# Patient Record
Sex: FEMALE | Race: WHITE | NOT HISPANIC OR LATINO | Employment: OTHER | URBAN - METROPOLITAN AREA
[De-identification: names, ages, dates, MRNs, and addresses within clinical notes are randomized per-mention and may not be internally consistent; named-entity substitution may affect disease eponyms.]

---

## 2017-07-19 ENCOUNTER — OFFICE VISIT (OUTPATIENT)
Dept: SURGERY | Facility: CLINIC | Age: 47
End: 2017-07-19

## 2017-07-19 VITALS
HEART RATE: 93 BPM | TEMPERATURE: 97 F | BODY MASS INDEX: 50.02 KG/M2 | DIASTOLIC BLOOD PRESSURE: 88 MMHG | HEIGHT: 64 IN | RESPIRATION RATE: 22 BRPM | SYSTOLIC BLOOD PRESSURE: 128 MMHG | OXYGEN SATURATION: 99 % | WEIGHT: 293 LBS

## 2017-07-19 DIAGNOSIS — L08.9 INFECTED SEBACEOUS CYST: Primary | ICD-10-CM

## 2017-07-19 DIAGNOSIS — L03.311 ABDOMINAL WALL CELLULITIS: ICD-10-CM

## 2017-07-19 DIAGNOSIS — L72.3 INFECTED SEBACEOUS CYST: Primary | ICD-10-CM

## 2017-07-19 PROCEDURE — 87205 SMEAR GRAM STAIN: CPT | Performed by: SURGERY

## 2017-07-19 PROCEDURE — 87070 CULTURE OTHR SPECIMN AEROBIC: CPT | Performed by: SURGERY

## 2017-07-19 PROCEDURE — 99203 OFFICE O/P NEW LOW 30 MIN: CPT | Performed by: SURGERY

## 2017-07-19 RX ORDER — PRAVASTATIN SODIUM 40 MG
40 TABLET ORAL DAILY
COMMUNITY

## 2017-07-19 RX ORDER — SPIRONOLACTONE 25 MG/1
25 TABLET ORAL DAILY
COMMUNITY

## 2017-07-19 RX ORDER — PERPHENAZINE 4 MG/1
4 TABLET ORAL 2 TIMES DAILY
COMMUNITY
End: 2019-03-31

## 2017-07-19 RX ORDER — METRONIDAZOLE 500 MG/1
500 TABLET ORAL 3 TIMES DAILY
Qty: 30 TABLET | Refills: 0 | Status: SHIPPED | OUTPATIENT
Start: 2017-07-19 | End: 2017-07-29

## 2017-07-19 RX ORDER — LEVOTHYROXINE SODIUM 0.03 MG/1
25 TABLET ORAL DAILY
Status: ON HOLD | COMMUNITY
End: 2017-12-07

## 2017-07-19 RX ORDER — ALLOPURINOL 300 MG/1
300 TABLET ORAL DAILY
COMMUNITY

## 2017-07-19 RX ORDER — DIVALPROEX SODIUM 500 MG/1
500 TABLET, DELAYED RELEASE ORAL 3 TIMES DAILY
COMMUNITY
End: 2018-05-01 | Stop reason: SDUPTHER

## 2017-07-19 RX ORDER — CIPROFLOXACIN 500 MG/1
500 TABLET, FILM COATED ORAL 2 TIMES DAILY
Qty: 20 TABLET | Refills: 0 | Status: SHIPPED | OUTPATIENT
Start: 2017-07-19 | End: 2017-07-29

## 2017-07-19 RX ORDER — FUROSEMIDE 20 MG/1
20 TABLET ORAL 2 TIMES DAILY
Status: ON HOLD | COMMUNITY
End: 2019-09-26 | Stop reason: SDUPTHER

## 2017-07-19 RX ORDER — TRAMADOL HYDROCHLORIDE 50 MG/1
50 TABLET ORAL EVERY 6 HOURS PRN
COMMUNITY
End: 2018-03-23 | Stop reason: SDUPTHER

## 2017-07-19 RX ORDER — DEXTROAMPHETAMINE SACCHARATE, AMPHETAMINE ASPARTATE, DEXTROAMPHETAMINE SULFATE AND AMPHETAMINE SULFATE 5; 5; 5; 5 MG/1; MG/1; MG/1; MG/1
20 TABLET ORAL DAILY
COMMUNITY
End: 2019-09-26 | Stop reason: HOSPADM

## 2017-07-19 NOTE — PROGRESS NOTES
PATIENT INFORMATION  Kirsten Centeno   - 1970    CHIEF COMPLAINT  Chief Complaint   Patient presents with   • Cyst   pubic cyst x 3 days, has increased in size, no drainage  Abdominal wall swelling and redness  Referral from Dr. Cunha      HISTORY OF PRESENT ILLNESS  HPI    Patient is a 47-year-old female with morbid obesity BMI is 52 who presents to the office today for evaluation for abdominal wall cellulitis and a infected cyst on the vaginal area.  She reports that she noticed a cyst on the suprapubic area 3-4 days ago.  Subsequently he developed a infra-abdominal rash.  She was seen by her primary care physician and started on Keflex and topical powder.  The rash has resolved the cyst has become smaller in size but she has now since the last 24 hours developed severe swelling and erythema of her abdominal wall.  She is unaware of any trauma, injury, hematoma.  Denies any previous soft tissue infections, history of MRSA or staph aureus.  She does have morbid obesity and diabetes.  She informed me that she has not checked her blood sugars she did since she does not own a glucometer.  She denies any fevers, chills, sweats, nausea, vomiting.  She has had associated abdominal pain along the infraumbilical aspect.  She reports regular bowel movements, denies melena or hematochezia.    REVIEW OF SYSTEMS  Review of Systems   Constitutional: Positive for activity change. Negative for chills, fever and unexpected weight change.   Respiratory: Negative.    Cardiovascular: Negative.    Gastrointestinal: Positive for abdominal pain.   Endocrine: Positive for polydipsia and polyuria.   Skin: Positive for color change and wound.         ACTIVE PROBLEMS  There are no active problems to display for this patient.        PAST MEDICAL HISTORY  Past Medical History:   Diagnosis Date   • Diabetes    • Gout    • HTN (hypertension)    • Hyperlipidemia    • Hyperthyroidism    • Neuropathy          SURGICAL HISTORY  Past  Surgical History:   Procedure Laterality Date   • MANDIBLE FRACTURE SURGERY           FAMILY HISTORY  Family History   Problem Relation Age of Onset   • Cervical cancer Maternal Grandmother          SOCIAL HISTORY  Social History     Occupational History   • Not on file.     Social History Main Topics   • Smoking status: Current Every Day Smoker     Packs/day: 2.00   • Smokeless tobacco: Not on file   • Alcohol use No   • Drug use: Not on file   • Sexual activity: Not on file         CURRENT MEDICATIONS    Current Outpatient Prescriptions:   •  allopurinol (ZYLOPRIM) 300 MG tablet, Take 300 mg by mouth Daily., Disp: , Rfl:   •  amphetamine-dextroamphetamine (ADDERALL) 20 MG tablet, Take 20 mg by mouth Daily., Disp: , Rfl:   •  divalproex (DEPAKOTE) 500 MG DR tablet, Take 500 mg by mouth 3 (Three) Times a Day., Disp: , Rfl:   •  furosemide (LASIX) 20 MG tablet, Take 20 mg by mouth 2 (Two) Times a Day., Disp: , Rfl:   •  levothyroxine (SYNTHROID, LEVOTHROID) 25 MCG tablet, Take 25 mcg by mouth Daily., Disp: , Rfl:   •  OLMESARTAN MEDOXOMIL-HCTZ PO, Take  by mouth., Disp: , Rfl:   •  Omega-3 Fatty Acids (OMEGA 3 PO), Take  by mouth., Disp: , Rfl:   •  perphenazine (TRILAFON) 4 MG tablet, Take 4 mg by mouth 2 (Two) Times a Day., Disp: , Rfl:   •  pravastatin (PRAVACHOL) 40 MG tablet, Take 40 mg by mouth Daily., Disp: , Rfl:   •  spironolactone (ALDACTONE) 25 MG tablet, Take 25 mg by mouth Daily., Disp: , Rfl:   •  traMADol (ULTRAM) 50 MG tablet, Take 50 mg by mouth Every 6 (Six) Hours As Needed for Moderate Pain ., Disp: , Rfl:   •  ciprofloxacin (CIPRO) 500 MG tablet, Take 1 tablet by mouth 2 (Two) Times a Day for 10 days., Disp: 20 tablet, Rfl: 0  •  metroNIDAZOLE (FLAGYL) 500 MG tablet, Take 1 tablet by mouth 3 (Three) Times a Day for 10 days., Disp: 30 tablet, Rfl: 0    ALLERGIES  Penicillins    VITALS  Vitals:    07/19/17 1022   BP: 128/88   Pulse: 93   Resp: 22   Temp: 97 °F (36.1 °C)   SpO2: 99%   Weight: (!) 303  "lb (137 kg)   Height: 64\" (162.6 cm)       LAST RESULTS   Admission on 08/08/2014, Discharged on 08/08/2014   Component Date Value Ref Range Status   • Glucose 08/08/2014 82  65 - 99 mg/dL Final   • BUN 08/08/2014 31* 6 - 20 mg/dL Final   • Creatinine 08/08/2014 0.90  0.57 - 1.00 mg/dL Final   • Sodium 08/08/2014 136  136 - 145 mmol/L Final   • Potassium 08/08/2014 4.1  3.5 - 5.2 mmol/L Final   • Chloride 08/08/2014 91* 98 - 107 mmol/L Final   • CO2 08/08/2014 27  22 - 29 mmol/L Final   • Calcium 08/08/2014 10.4* 8.6 - 10.2 mg/dL Final   • eGFR 08/08/2014 >60  ml/min/1.732 Final    Comment: DF by IF @ 08/08/2014 20:09  GFR Normal                            >60  Chronic Kidney Disease          <60  Kidney Failure                         <15       No results found.    PHYSICAL EXAM  Physical Exam   Constitutional: She is oriented to person, place, and time. She appears well-developed and well-nourished.   HENT:   Head: Normocephalic and atraumatic.   Eyes: No scleral icterus.   Neck: Normal range of motion. Neck supple.   Cardiovascular: Normal rate, regular rhythm and normal heart sounds.    Pulmonary/Chest: Breath sounds normal.   Abdominal:   Soft, morbidly obese, large abdominal pannus with infra umbilical abdominal wall cellulitis and brawny edema.  It is mildly tender to palpation.    Infraumbilical fungal rash resolving.  There is a small indurated area over the mons pubis.  I was able to express a scant amount of drainage cultures of which were obtained.  There is no fluctuance or drainable collection at this time there is no crepitus or necrosis.   Musculoskeletal: She exhibits edema.   Neurological: She is alert and oriented to person, place, and time.   Nursing note and vitals reviewed.      ASSESSMENT  Soft tissue infection  Infected cyst on mons pubis/vagina--  I have recommended to the patient local wound care, warm compresses, I will switch her antibiotics to ciprofloxacin and Flagyl for better " coverage.  I have discussed with her that we will reassess this and that it might need to be drained.  At this time there is no drainable collection.    Abdominal wall cellulitis/panniculitis  Discussed with patient will need urgent CT scan abdomen pelvis and likely admission to hospital for IV antibiotics  Patient declined due to personal family obligation today but is agreeable to getting CT scan as outpatient.  Will obtain CT scan with oral and IV contrast as soon as possible  Will check CBC and CMP      PLAN  Follow-up 7/24/17.  I will also call the patient once the CT scan results are available.  Patient was advised to call the office sooner should she have worsening symptoms or go to the nearest emergency room.

## 2017-07-20 ENCOUNTER — HOSPITAL ENCOUNTER (OUTPATIENT)
Dept: CT IMAGING | Facility: HOSPITAL | Age: 47
Discharge: HOME | End: 2017-07-20
Attending: SURGERY | Admitting: SURGERY

## 2017-07-20 ENCOUNTER — APPOINTMENT (OUTPATIENT)
Dept: LAB | Facility: HOSPITAL | Age: 47
End: 2017-07-20

## 2017-07-20 LAB
ANION GAP SERPL CALCULATED.3IONS-SCNC: 11.6 MMOL/L
BASOPHILS # BLD AUTO: 0.05 10*3/MM3 (ref 0–0.2)
BASOPHILS NFR BLD AUTO: 0.4 % (ref 0–2)
BUN BLD-MCNC: 9 MG/DL (ref 6–20)
BUN/CREAT SERPL: 10.8 (ref 7–25)
CALCIUM SPEC-SCNC: 9.8 MG/DL (ref 8.6–10.5)
CHLORIDE SERPL-SCNC: 91 MMOL/L (ref 98–107)
CO2 SERPL-SCNC: 28.4 MMOL/L (ref 22–29)
CREAT BLD-MCNC: 0.83 MG/DL (ref 0.57–1)
DEPRECATED RDW RBC AUTO: 49.7 FL (ref 37–54)
EOSINOPHIL # BLD AUTO: 0.16 10*3/MM3 (ref 0.1–0.3)
EOSINOPHIL NFR BLD AUTO: 1.3 % (ref 0–4)
ERYTHROCYTE [DISTWIDTH] IN BLOOD BY AUTOMATED COUNT: 16.1 % (ref 11.5–14.5)
GFR SERPL CREATININE-BSD FRML MDRD: 73 ML/MIN/1.73
GLUCOSE BLD-MCNC: 108 MG/DL (ref 65–99)
HCT VFR BLD AUTO: 40.6 % (ref 37–47)
HGB BLD-MCNC: 13.2 G/DL (ref 12–16)
IMM GRANULOCYTES # BLD: 0.51 10*3/MM3 (ref 0–0.03)
IMM GRANULOCYTES NFR BLD: 4.2 % (ref 0–0.5)
LYMPHOCYTES # BLD AUTO: 3.21 10*3/MM3 (ref 0.6–4.8)
LYMPHOCYTES NFR BLD AUTO: 26.3 % (ref 20–45)
MCH RBC QN AUTO: 27.3 PG (ref 27–31)
MCHC RBC AUTO-ENTMCNC: 32.5 G/DL (ref 31–37)
MCV RBC AUTO: 84.1 FL (ref 81–99)
MONOCYTES # BLD AUTO: 0.9 10*3/MM3 (ref 0–1)
MONOCYTES NFR BLD AUTO: 7.4 % (ref 3–8)
NEUTROPHILS # BLD AUTO: 7.38 10*3/MM3 (ref 1.5–8.3)
NEUTROPHILS NFR BLD AUTO: 60.4 % (ref 45–70)
NRBC BLD MANUAL-RTO: 0 /100 WBC (ref 0–0)
PLATELET # BLD AUTO: 381 10*3/MM3 (ref 140–500)
PMV BLD AUTO: 8.5 FL (ref 7.4–10.4)
POTASSIUM BLD-SCNC: 4.7 MMOL/L (ref 3.5–5.2)
RBC # BLD AUTO: 4.83 10*6/MM3 (ref 4.2–5.4)
SODIUM BLD-SCNC: 131 MMOL/L (ref 136–145)
WBC NRBC COR # BLD: 12.21 10*3/MM3 (ref 4.8–10.8)

## 2017-07-20 PROCEDURE — 85025 COMPLETE CBC W/AUTO DIFF WBC: CPT | Performed by: SURGERY

## 2017-07-20 PROCEDURE — 36415 COLL VENOUS BLD VENIPUNCTURE: CPT | Performed by: SURGERY

## 2017-07-20 PROCEDURE — 74177 CT ABD & PELVIS W/CONTRAST: CPT

## 2017-07-20 PROCEDURE — 80048 BASIC METABOLIC PNL TOTAL CA: CPT | Performed by: SURGERY

## 2017-07-20 PROCEDURE — 0 IOPAMIDOL PER 1 ML: Performed by: SURGERY

## 2017-07-20 RX ADMIN — IOPAMIDOL 100 ML: 755 INJECTION, SOLUTION INTRAVENOUS at 17:45

## 2017-07-21 LAB
BACTERIA SPEC AEROBE CULT: NORMAL
GRAM STN SPEC: NORMAL

## 2017-07-24 ENCOUNTER — OFFICE VISIT (OUTPATIENT)
Dept: SURGERY | Facility: CLINIC | Age: 47
End: 2017-07-24

## 2017-07-24 VITALS
HEIGHT: 64 IN | BODY MASS INDEX: 50.02 KG/M2 | SYSTOLIC BLOOD PRESSURE: 132 MMHG | WEIGHT: 293 LBS | DIASTOLIC BLOOD PRESSURE: 88 MMHG

## 2017-07-24 DIAGNOSIS — L72.9 INFECTED CYST OF SKIN: ICD-10-CM

## 2017-07-24 DIAGNOSIS — M79.3 PANNICULITIS: Primary | ICD-10-CM

## 2017-07-24 DIAGNOSIS — L08.9 INFECTED CYST OF SKIN: ICD-10-CM

## 2017-07-24 PROCEDURE — 99213 OFFICE O/P EST LOW 20 MIN: CPT | Performed by: SURGERY

## 2017-07-24 RX ORDER — NYSTATIN 100000 [USP'U]/G
POWDER TOPICAL 2 TIMES DAILY
Qty: 30 G | Refills: 1 | Status: SHIPPED | OUTPATIENT
Start: 2017-07-24 | End: 2018-03-23

## 2017-07-31 ENCOUNTER — OFFICE VISIT (OUTPATIENT)
Dept: SURGERY | Facility: CLINIC | Age: 47
End: 2017-07-31

## 2017-07-31 VITALS
SYSTOLIC BLOOD PRESSURE: 140 MMHG | WEIGHT: 293 LBS | HEIGHT: 64 IN | DIASTOLIC BLOOD PRESSURE: 98 MMHG | BODY MASS INDEX: 50.02 KG/M2

## 2017-07-31 DIAGNOSIS — Z09 FOLLOW UP: Primary | ICD-10-CM

## 2017-07-31 DIAGNOSIS — L98.9 ARM SKIN LESION, LEFT: ICD-10-CM

## 2017-07-31 PROCEDURE — 99213 OFFICE O/P EST LOW 20 MIN: CPT | Performed by: SURGERY

## 2017-07-31 RX ORDER — ESZOPICLONE 3 MG/1
TABLET, FILM COATED ORAL
Status: ON HOLD | COMMUNITY
Start: 2017-07-25 | End: 2017-12-07

## 2017-07-31 NOTE — PROGRESS NOTES
PATIENT INFORMATION  Kirsten Centeno   - 1970    CHIEF COMPLAINT  Chief Complaint   Patient presents with   • Follow-up   F/U CYST, PT HAS FINISHED ABX, AREA IS HEALING WELL  Would like to discuss another skin lesion on left shoulder    HISTORY OF PRESENT ILLNESS  HPI  Patient presents to the office today for follow-up.  She reports she is doing well.  Denies any fevers, chills, erythema or drainage.  She reports her abdominal wall cellulitis is completely resolved.  As far as the suprapubic cyst is concerned that is completely healed.  She still has some infra-umbilical rash around the groin and under the pannus and has been applying nystatin powder.  She does complain of left ear drainage and sinus drainage and have advised her to contact her primary care physician in this regards.  She also would like to have a skin lesion evaluated for possible surgical excision she reports she has had this on her left shoulder it has been there for several years but has recently enlarged in size, is discolored and has irregular borders.    REVIEW OF SYSTEMS  Review of Systems   Constitutional: Negative.    HENT: Positive for congestion, postnasal drip and sinus pressure.    Respiratory: Negative.    Cardiovascular: Negative.    Gastrointestinal: Negative.    Skin: Negative for color change and wound.        Skin lesion         ACTIVE PROBLEMS  There are no active problems to display for this patient.        PAST MEDICAL HISTORY  Past Medical History:   Diagnosis Date   • Diabetes    • Gout    • HTN (hypertension)    • Hyperlipidemia    • Hyperthyroidism    • Neuropathy          SURGICAL HISTORY  Past Surgical History:   Procedure Laterality Date   • MANDIBLE FRACTURE SURGERY           FAMILY HISTORY  Family History   Problem Relation Age of Onset   • Cervical cancer Maternal Grandmother          SOCIAL HISTORY  Social History     Occupational History   • Not on file.     Social History Main Topics   • Smoking status:  "Current Every Day Smoker     Packs/day: 2.00   • Smokeless tobacco: Not on file   • Alcohol use No   • Drug use: Not on file   • Sexual activity: Not on file         CURRENT MEDICATIONS    Current Outpatient Prescriptions:   •  allopurinol (ZYLOPRIM) 300 MG tablet, Take 300 mg by mouth Daily., Disp: , Rfl:   •  amphetamine-dextroamphetamine (ADDERALL) 20 MG tablet, Take 20 mg by mouth Daily., Disp: , Rfl:   •  divalproex (DEPAKOTE) 500 MG DR tablet, Take 500 mg by mouth 3 (Three) Times a Day., Disp: , Rfl:   •  eszopiclone (LUNESTA) 3 MG tablet, , Disp: , Rfl:   •  furosemide (LASIX) 20 MG tablet, Take 20 mg by mouth 2 (Two) Times a Day., Disp: , Rfl:   •  levothyroxine (SYNTHROID, LEVOTHROID) 25 MCG tablet, Take 25 mcg by mouth Daily., Disp: , Rfl:   •  nystatin (MYCOSTATIN) 555002 UNIT/GM powder, Apply  topically 2 (Two) Times a Day. APPLY TO AFFECTED AREA TWICE DAILY X 14 DAYS, Disp: 30 g, Rfl: 1  •  OLMESARTAN MEDOXOMIL-HCTZ PO, Take  by mouth., Disp: , Rfl:   •  Omega-3 Fatty Acids (OMEGA 3 PO), Take  by mouth., Disp: , Rfl:   •  perphenazine (TRILAFON) 4 MG tablet, Take 4 mg by mouth 2 (Two) Times a Day., Disp: , Rfl:   •  pravastatin (PRAVACHOL) 40 MG tablet, Take 40 mg by mouth Daily., Disp: , Rfl:   •  spironolactone (ALDACTONE) 25 MG tablet, Take 25 mg by mouth Daily., Disp: , Rfl:   •  traMADol (ULTRAM) 50 MG tablet, Take 50 mg by mouth Every 6 (Six) Hours As Needed for Moderate Pain ., Disp: , Rfl:     ALLERGIES  Penicillins    VITALS  Vitals:    07/31/17 1033   BP: 140/98   Weight: 295 lb (134 kg)   Height: 64\" (162.6 cm)       LAST RESULTS   Office Visit on 07/19/2017   Component Date Value Ref Range Status   • Wound Culture 07/19/2017 Moderate growth (3+) Normal Urogenital Brionna   Final   • Gram Stain Result 07/19/2017 Few (2+) WBCs seen   Final   • Gram Stain Result 07/19/2017 Moderate (3+) Gram positive cocci in pairs and clusters   Final   • Gram Stain Result 07/19/2017 Few (2+) Gram negative " bacilli   Final   • Gram Stain Result 07/19/2017 Rare (1+) Gram positive bacilli   Final   • Glucose 07/20/2017 108* 65 - 99 mg/dL Final   • BUN 07/20/2017 9  6 - 20 mg/dL Final   • Creatinine 07/20/2017 0.83  0.57 - 1.00 mg/dL Final   • Sodium 07/20/2017 131* 136 - 145 mmol/L Final   • Potassium 07/20/2017 4.7  3.5 - 5.2 mmol/L Final   • Chloride 07/20/2017 91* 98 - 107 mmol/L Final   • CO2 07/20/2017 28.4  22.0 - 29.0 mmol/L Final   • Calcium 07/20/2017 9.8  8.6 - 10.5 mg/dL Final   • eGFR Non  Amer 07/20/2017 73  >60 mL/min/1.73 Final   • BUN/Creatinine Ratio 07/20/2017 10.8  7.0 - 25.0 Final   • Anion Gap 07/20/2017 11.6  mmol/L Final   • WBC 07/20/2017 12.21* 4.80 - 10.80 10*3/mm3 Final   • RBC 07/20/2017 4.83  4.20 - 5.40 10*6/mm3 Final   • Hemoglobin 07/20/2017 13.2  12.0 - 16.0 g/dL Final   • Hematocrit 07/20/2017 40.6  37.0 - 47.0 % Final   • MCV 07/20/2017 84.1  81.0 - 99.0 fL Final   • MCH 07/20/2017 27.3  27.0 - 31.0 pg Final   • MCHC 07/20/2017 32.5  31.0 - 37.0 g/dL Final   • RDW 07/20/2017 16.1* 11.5 - 14.5 % Final   • RDW-SD 07/20/2017 49.7  37.0 - 54.0 fl Final   • MPV 07/20/2017 8.5  7.4 - 10.4 fL Final   • Platelets 07/20/2017 381  140 - 500 10*3/mm3 Final   • Neutrophil % 07/20/2017 60.4  45.0 - 70.0 % Final   • Lymphocyte % 07/20/2017 26.3  20.0 - 45.0 % Final   • Monocyte % 07/20/2017 7.4  3.0 - 8.0 % Final   • Eosinophil % 07/20/2017 1.3  0.0 - 4.0 % Final   • Basophil % 07/20/2017 0.4  0.0 - 2.0 % Final   • Immature Grans % 07/20/2017 4.2* 0.0 - 0.5 % Final   • Neutrophils, Absolute 07/20/2017 7.38  1.50 - 8.30 10*3/mm3 Final   • Lymphocytes, Absolute 07/20/2017 3.21  0.60 - 4.80 10*3/mm3 Final   • Monocytes, Absolute 07/20/2017 0.90  0.00 - 1.00 10*3/mm3 Final   • Eosinophils, Absolute 07/20/2017 0.16  0.10 - 0.30 10*3/mm3 Final   • Basophils, Absolute 07/20/2017 0.05  0.00 - 0.20 10*3/mm3 Final   • Immature Grans, Absolute 07/20/2017 0.51* 0.00 - 0.03 10*3/mm3 Final   • nRBC  07/20/2017 0.0  0.0 - 0.0 /100 WBC Final     Ct Abdomen Pelvis W Contrast    Result Date: 7/21/2017  Narrative: CT ABDOMEN AND PELVIS WITH CONTRAST, 07/20/2017:  HISTORY: 47-year-old female referred infraumbilical abdominal wall cellulitis. Morbid obesity.  TECHNIQUE: CT examination of the abdomen and pelvis was performed with oral and IV contrast. Radiation dose reduction techniques were utilized, including automated exposure control and exposure modulation based on body size.  COMPARISON: *  CT pelvis, 09/16/2016.  ABDOMEN FINDINGS: The examination shows mild cutaneous thickening inferior to the umbilicus, but there is no deeper soft tissue abnormality within the subcutaneous soft tissues, there is no evidence of abscess or other fluid collection. Similar findings were present on the previous CT study. Of note, the inferior margins of the large pannus extend inferiorly below the level of the imaged pelvis and are not included on this exam. There is no umbilical hernia or additional abdominal wall hernia.  Liver, pancreas, spleen and kidneys are normal in size and appearance. Nondistended gallbladder. No bile duct dilatation.  Small bowel and colon are normal in caliber and appearance. No evidence of acute appendicitis. Normal caliber abdominal aorta.  PELVIS FINDINGS: Uterus, ovaries, urinary bladder and rectum appear normal. There is no inguinal hernia. The patient has additional reported history of a small cutaneous cystic lesion in the pubic region reportedly treated by the referring physician, but this region is apparently not included in the field-of-view.      Impression: 1. Anterior abdominal wall skin thickening in the infraumbilical region compatible with the clinical history of cellulitis. Similar findings were present on the previous CT study of 09/16/2016. No evidence of abscess or additional complicating feature. 2. The remainder of the examination is negative. No inguinal hernia or abdominal wall  hernia.  This report was finalized on 7/21/2017 7:15 AM by Dr. Wolf Flanagan MD.        PHYSICAL EXAM  Physical Exam   Constitutional: She appears well-developed and well-nourished.   Eyes: No scleral icterus.   Neck: Neck supple.   Cardiovascular: Normal rate, regular rhythm and normal heart sounds.    Pulmonary/Chest: Breath sounds normal.   Abdominal:   Soft, morbidly obese,the panniculitis/cellulitis is completely resolved.    Suprapubic cyst completely healed no erythema no drainage no abscess.   Musculoskeletal:   Left shoulder anterior lateral aspect there is a 3 cm x 1 cm skin lesion with irregular borders and variegated  discoloration.  It is mildly tender to palpation.   Nursing note and vitals reviewed.      ASSESSMENT  Abdominal wall panniculitis/cellulitis-completely resolved  Abdominal wall/suprapubic infected sebaceous cyst completely resolved    Risk of recurrence discussed with patient who verbalized understanding.    Left shoulder skin lesion - excision under local anesthesia discussed with the patient.  Procedure, risks, benefits complications including but not limited to risk of bleeding, infection, flap necrosis, flap ischemia, risk of residual disease, risk of recurrence as well as complications associated with local anesthesia were thoroughly discussed with the patient who understood and gave verbal informed consent.     PLAN  Will schedule for in office procedure for left shoulder lesion excision.  Patient was advised to contact therapy regarding her complaints of sinus congestion and possible ear infection.  Patient was advised to call the office sooner should she worsening symptoms or go to the nearest emergency room.

## 2017-11-17 ENCOUNTER — OFFICE VISIT (OUTPATIENT)
Dept: ORTHOPEDIC SURGERY | Facility: CLINIC | Age: 47
End: 2017-11-17

## 2017-11-17 VITALS
HEART RATE: 102 BPM | DIASTOLIC BLOOD PRESSURE: 83 MMHG | SYSTOLIC BLOOD PRESSURE: 133 MMHG | WEIGHT: 293 LBS | BODY MASS INDEX: 50.02 KG/M2 | HEIGHT: 64 IN

## 2017-11-17 DIAGNOSIS — R52 PAIN: Primary | ICD-10-CM

## 2017-11-17 DIAGNOSIS — M17.0 PRIMARY OSTEOARTHRITIS OF BOTH KNEES: ICD-10-CM

## 2017-11-17 PROCEDURE — 20610 DRAIN/INJ JOINT/BURSA W/O US: CPT | Performed by: ORTHOPAEDIC SURGERY

## 2017-11-17 PROCEDURE — 99213 OFFICE O/P EST LOW 20 MIN: CPT | Performed by: ORTHOPAEDIC SURGERY

## 2017-11-17 PROCEDURE — 73562 X-RAY EXAM OF KNEE 3: CPT | Performed by: ORTHOPAEDIC SURGERY

## 2017-11-17 RX ORDER — DIVALPROEX SODIUM 500 MG/1
TABLET, EXTENDED RELEASE ORAL
Status: ON HOLD | COMMUNITY
Start: 2017-10-17 | End: 2017-12-07

## 2017-11-17 RX ORDER — RISPERIDONE 2 MG/1
TABLET ORAL
Status: ON HOLD | COMMUNITY
Start: 2017-10-17 | End: 2017-12-07

## 2017-11-17 RX ORDER — OLMESARTAN MEDOXOMIL 40 MG/1
40 TABLET ORAL DAILY
COMMUNITY
Start: 2017-11-15 | End: 2019-09-26 | Stop reason: HOSPADM

## 2017-11-17 RX ADMIN — BUPIVACAINE HYDROCHLORIDE 4 ML: 5 INJECTION, SOLUTION EPIDURAL; INTRACAUDAL at 09:33

## 2017-11-17 RX ADMIN — LIDOCAINE HYDROCHLORIDE 4 ML: 10 INJECTION, SOLUTION EPIDURAL; INFILTRATION; INTRACAUDAL; PERINEURAL at 09:33

## 2017-11-17 RX ADMIN — TRIAMCINOLONE ACETONIDE 80 MG: 40 INJECTION, SUSPENSION INTRA-ARTICULAR; INTRAMUSCULAR at 09:33

## 2017-12-02 PROBLEM — M17.0 PRIMARY OSTEOARTHRITIS OF BOTH KNEES: Status: ACTIVE | Noted: 2017-12-02

## 2017-12-02 RX ORDER — LIDOCAINE HYDROCHLORIDE 10 MG/ML
4 INJECTION, SOLUTION EPIDURAL; INFILTRATION; INTRACAUDAL; PERINEURAL
Status: COMPLETED | OUTPATIENT
Start: 2017-11-17 | End: 2017-11-17

## 2017-12-02 RX ORDER — BUPIVACAINE HYDROCHLORIDE 5 MG/ML
4 INJECTION, SOLUTION EPIDURAL; INTRACAUDAL
Status: COMPLETED | OUTPATIENT
Start: 2017-11-17 | End: 2017-11-17

## 2017-12-02 RX ORDER — TRIAMCINOLONE ACETONIDE 40 MG/ML
80 INJECTION, SUSPENSION INTRA-ARTICULAR; INTRAMUSCULAR
Status: COMPLETED | OUTPATIENT
Start: 2017-11-17 | End: 2017-11-17

## 2017-12-07 ENCOUNTER — HOSPITAL ENCOUNTER (INPATIENT)
Facility: HOSPITAL | Age: 47
LOS: 4 days | Discharge: HOME | End: 2017-12-11
Attending: EMERGENCY MEDICINE | Admitting: FAMILY MEDICINE

## 2017-12-07 ENCOUNTER — APPOINTMENT (OUTPATIENT)
Dept: CT IMAGING | Facility: HOSPITAL | Age: 47
End: 2017-12-07

## 2017-12-07 ENCOUNTER — APPOINTMENT (OUTPATIENT)
Dept: GENERAL RADIOLOGY | Facility: HOSPITAL | Age: 47
End: 2017-12-07

## 2017-12-07 DIAGNOSIS — L03.311 CELLULITIS OF ABDOMINAL WALL: Primary | ICD-10-CM

## 2017-12-07 DIAGNOSIS — E86.0 DEHYDRATION: ICD-10-CM

## 2017-12-07 DIAGNOSIS — N17.9 AKI (ACUTE KIDNEY INJURY) (HCC): ICD-10-CM

## 2017-12-07 LAB
ALBUMIN SERPL-MCNC: 3.6 G/DL (ref 3.5–5.2)
ALBUMIN/GLOB SERPL: 1 G/DL
ALP SERPL-CCNC: 97 U/L (ref 40–129)
ALT SERPL W P-5'-P-CCNC: 7 U/L (ref 5–33)
ANION GAP SERPL CALCULATED.3IONS-SCNC: 18.5 MMOL/L
APTT PPP: 34.9 SECONDS (ref 24.3–38.1)
AST SERPL-CCNC: 9 U/L (ref 5–32)
BASOPHILS # BLD AUTO: 0.15 10*3/MM3 (ref 0–0.2)
BASOPHILS NFR BLD AUTO: 0.8 % (ref 0–2)
BILIRUB SERPL-MCNC: 0.3 MG/DL (ref 0.2–1.2)
BILIRUB UR QL STRIP: ABNORMAL
BUN BLD-MCNC: 13 MG/DL (ref 6–20)
BUN/CREAT SERPL: 7.7 (ref 7–25)
CALCIUM SPEC-SCNC: 9.2 MG/DL (ref 8.6–10.5)
CHLORIDE SERPL-SCNC: 86 MMOL/L (ref 98–107)
CLARITY UR: CLEAR
CO2 SERPL-SCNC: 27.5 MMOL/L (ref 22–29)
COLOR UR: ABNORMAL
CREAT BLD-MCNC: 1.69 MG/DL (ref 0.57–1)
D-LACTATE SERPL-SCNC: 2 MMOL/L (ref 0.5–2)
DEPRECATED RDW RBC AUTO: 52.3 FL (ref 37–54)
EOSINOPHIL # BLD AUTO: 0.22 10*3/MM3 (ref 0.1–0.3)
EOSINOPHIL NFR BLD AUTO: 1.2 % (ref 0–4)
ERYTHROCYTE [DISTWIDTH] IN BLOOD BY AUTOMATED COUNT: 17.5 % (ref 11.5–14.5)
GFR SERPL CREATININE-BSD FRML MDRD: 32 ML/MIN/1.73
GLOBULIN UR ELPH-MCNC: 3.5 GM/DL
GLUCOSE BLD-MCNC: 125 MG/DL (ref 65–99)
GLUCOSE UR STRIP-MCNC: NEGATIVE MG/DL
HCT VFR BLD AUTO: 43.6 % (ref 37–47)
HGB BLD-MCNC: 14 G/DL (ref 12–16)
HGB UR QL STRIP.AUTO: NEGATIVE
IMM GRANULOCYTES # BLD: 0.92 10*3/MM3 (ref 0–0.03)
IMM GRANULOCYTES NFR BLD: 4.8 % (ref 0–0.5)
INR PPP: 0.97 (ref 0.9–1.1)
KETONES UR QL STRIP: ABNORMAL
LEUKOCYTE ESTERASE UR QL STRIP.AUTO: NEGATIVE
LYMPHOCYTES # BLD AUTO: 3.88 10*3/MM3 (ref 0.6–4.8)
LYMPHOCYTES NFR BLD AUTO: 20.5 % (ref 20–45)
MCH RBC QN AUTO: 27.1 PG (ref 27–31)
MCHC RBC AUTO-ENTMCNC: 32.1 G/DL (ref 31–37)
MCV RBC AUTO: 84.3 FL (ref 81–99)
MONOCYTES # BLD AUTO: 0.79 10*3/MM3 (ref 0–1)
MONOCYTES NFR BLD AUTO: 4.2 % (ref 3–8)
NEUTROPHILS # BLD AUTO: 13.01 10*3/MM3 (ref 1.5–8.3)
NEUTROPHILS NFR BLD AUTO: 68.5 % (ref 45–70)
NITRITE UR QL STRIP: NEGATIVE
NRBC BLD MANUAL-RTO: 0 /100 WBC (ref 0–0)
PH UR STRIP.AUTO: 5.5 [PH] (ref 4.5–8)
PLATELET # BLD AUTO: 441 10*3/MM3 (ref 140–500)
PMV BLD AUTO: 8.5 FL (ref 7.4–10.4)
POTASSIUM BLD-SCNC: 3.9 MMOL/L (ref 3.5–5.2)
PROCALCITONIN SERPL-MCNC: 0.04 NG/ML (ref 0.1–0.25)
PROT SERPL-MCNC: 7.1 G/DL (ref 6–8.5)
PROT UR QL STRIP: ABNORMAL
PROTHROMBIN TIME: 12.9 SECONDS (ref 12.1–15)
RBC # BLD AUTO: 5.17 10*6/MM3 (ref 4.2–5.4)
SODIUM BLD-SCNC: 132 MMOL/L (ref 136–145)
SP GR UR STRIP: 1.02 (ref 1–1.03)
TSH SERPL DL<=0.05 MIU/L-ACNC: 1.28 MIU/ML (ref 0.27–4.2)
UROBILINOGEN UR QL STRIP: ABNORMAL
WBC NRBC COR # BLD: 18.97 10*3/MM3 (ref 4.8–10.8)

## 2017-12-07 PROCEDURE — 94799 UNLISTED PULMONARY SVC/PX: CPT

## 2017-12-07 PROCEDURE — 99284 EMERGENCY DEPT VISIT MOD MDM: CPT

## 2017-12-07 PROCEDURE — 51702 INSERT TEMP BLADDER CATH: CPT

## 2017-12-07 PROCEDURE — 83605 ASSAY OF LACTIC ACID: CPT | Performed by: PHYSICIAN ASSISTANT

## 2017-12-07 PROCEDURE — 25010000002 FLUCONAZOLE PER 200 MG

## 2017-12-07 PROCEDURE — 85025 COMPLETE CBC W/AUTO DIFF WBC: CPT | Performed by: PHYSICIAN ASSISTANT

## 2017-12-07 PROCEDURE — 85610 PROTHROMBIN TIME: CPT | Performed by: PHYSICIAN ASSISTANT

## 2017-12-07 PROCEDURE — 25010000002 MEROPENEM PER 100 MG: Performed by: PHYSICIAN ASSISTANT

## 2017-12-07 PROCEDURE — 80053 COMPREHEN METABOLIC PANEL: CPT | Performed by: PHYSICIAN ASSISTANT

## 2017-12-07 PROCEDURE — 25010000002 METHYLPREDNISOLONE PER 40 MG

## 2017-12-07 PROCEDURE — 25010000002 ENOXAPARIN PER 10 MG: Performed by: FAMILY MEDICINE

## 2017-12-07 PROCEDURE — 87040 BLOOD CULTURE FOR BACTERIA: CPT | Performed by: PHYSICIAN ASSISTANT

## 2017-12-07 PROCEDURE — 85730 THROMBOPLASTIN TIME PARTIAL: CPT | Performed by: PHYSICIAN ASSISTANT

## 2017-12-07 PROCEDURE — 94640 AIRWAY INHALATION TREATMENT: CPT

## 2017-12-07 PROCEDURE — 74176 CT ABD & PELVIS W/O CONTRAST: CPT

## 2017-12-07 PROCEDURE — 25010000002 FLUCONAZOLE PER 200 MG: Performed by: PHYSICIAN ASSISTANT

## 2017-12-07 PROCEDURE — 81003 URINALYSIS AUTO W/O SCOPE: CPT | Performed by: EMERGENCY MEDICINE

## 2017-12-07 PROCEDURE — 84145 PROCALCITONIN (PCT): CPT | Performed by: PHYSICIAN ASSISTANT

## 2017-12-07 PROCEDURE — 25010000002 MEROPENEM PER 100 MG: Performed by: FAMILY MEDICINE

## 2017-12-07 PROCEDURE — 25010000002 VANCOMYCIN PER 500 MG: Performed by: PHYSICIAN ASSISTANT

## 2017-12-07 PROCEDURE — 99285 EMERGENCY DEPT VISIT HI MDM: CPT | Performed by: PHYSICIAN ASSISTANT

## 2017-12-07 PROCEDURE — 84443 ASSAY THYROID STIM HORMONE: CPT | Performed by: FAMILY MEDICINE

## 2017-12-07 PROCEDURE — 71020 HC CHEST PA AND LATERAL: CPT

## 2017-12-07 PROCEDURE — 87076 CULTURE ANAEROBE IDENT EACH: CPT | Performed by: PHYSICIAN ASSISTANT

## 2017-12-07 RX ORDER — METHYLPREDNISOLONE SODIUM SUCCINATE 40 MG/ML
INJECTION, POWDER, LYOPHILIZED, FOR SOLUTION INTRAMUSCULAR; INTRAVENOUS
Status: COMPLETED
Start: 2017-12-07 | End: 2017-12-07

## 2017-12-07 RX ORDER — NYSTATIN 100000 [USP'U]/G
POWDER TOPICAL EVERY 12 HOURS SCHEDULED
Status: DISCONTINUED | OUTPATIENT
Start: 2017-12-07 | End: 2017-12-11 | Stop reason: HOSPADM

## 2017-12-07 RX ORDER — DIVALPROEX SODIUM 250 MG/1
TABLET, DELAYED RELEASE ORAL
Status: COMPLETED
Start: 2017-12-07 | End: 2017-12-07

## 2017-12-07 RX ORDER — DIVALPROEX SODIUM 500 MG/1
500 TABLET, DELAYED RELEASE ORAL 3 TIMES DAILY
Status: DISCONTINUED | OUTPATIENT
Start: 2017-12-07 | End: 2017-12-11 | Stop reason: HOSPADM

## 2017-12-07 RX ORDER — PERPHENAZINE 4 MG/1
4 TABLET ORAL 2 TIMES DAILY
Status: DISCONTINUED | OUTPATIENT
Start: 2017-12-07 | End: 2017-12-11 | Stop reason: HOSPADM

## 2017-12-07 RX ORDER — ALLOPURINOL 300 MG/1
300 TABLET ORAL DAILY
Status: DISCONTINUED | OUTPATIENT
Start: 2017-12-07 | End: 2017-12-11 | Stop reason: HOSPADM

## 2017-12-07 RX ORDER — LEVOTHYROXINE SODIUM 0.05 MG/1
50 TABLET ORAL DAILY
COMMUNITY
End: 2018-06-12

## 2017-12-07 RX ORDER — SULFAMETHOXAZOLE AND TRIMETHOPRIM 800; 160 MG/1; MG/1
1 TABLET ORAL 2 TIMES DAILY
COMMUNITY
End: 2018-03-23

## 2017-12-07 RX ORDER — CALCIUM CARBONATE 200(500)MG
1 TABLET,CHEWABLE ORAL 2 TIMES DAILY PRN
Status: DISCONTINUED | OUTPATIENT
Start: 2017-12-07 | End: 2017-12-11 | Stop reason: HOSPADM

## 2017-12-07 RX ORDER — MEROPENEM 1 G/1
INJECTION, POWDER, FOR SOLUTION INTRAVENOUS
Status: DISPENSED
Start: 2017-12-07 | End: 2017-12-08

## 2017-12-07 RX ORDER — SODIUM CHLORIDE 9 MG/ML
40 INJECTION, SOLUTION INTRAVENOUS AS NEEDED
Status: DISCONTINUED | OUTPATIENT
Start: 2017-12-07 | End: 2017-12-11 | Stop reason: HOSPADM

## 2017-12-07 RX ORDER — FLUCONAZOLE 2 MG/ML
400 INJECTION, SOLUTION INTRAVENOUS ONCE
Status: COMPLETED | OUTPATIENT
Start: 2017-12-07 | End: 2017-12-07

## 2017-12-07 RX ORDER — TRAMADOL HYDROCHLORIDE 50 MG/1
50 TABLET ORAL EVERY 4 HOURS PRN
Status: DISCONTINUED | OUTPATIENT
Start: 2017-12-07 | End: 2017-12-11 | Stop reason: HOSPADM

## 2017-12-07 RX ORDER — BISACODYL 5 MG/1
5 TABLET, DELAYED RELEASE ORAL DAILY PRN
Status: DISCONTINUED | OUTPATIENT
Start: 2017-12-07 | End: 2017-12-11 | Stop reason: HOSPADM

## 2017-12-07 RX ORDER — LEVOTHYROXINE SODIUM 0.05 MG/1
50 TABLET ORAL
Status: DISCONTINUED | OUTPATIENT
Start: 2017-12-08 | End: 2017-12-11 | Stop reason: HOSPADM

## 2017-12-07 RX ORDER — SODIUM CHLORIDE 9 MG/ML
125 INJECTION, SOLUTION INTRAVENOUS CONTINUOUS
Status: DISCONTINUED | OUTPATIENT
Start: 2017-12-07 | End: 2017-12-09

## 2017-12-07 RX ORDER — IPRATROPIUM BROMIDE AND ALBUTEROL SULFATE 2.5; .5 MG/3ML; MG/3ML
3 SOLUTION RESPIRATORY (INHALATION) ONCE
Status: COMPLETED | OUTPATIENT
Start: 2017-12-07 | End: 2017-12-07

## 2017-12-07 RX ORDER — TERBINAFINE HYDROCHLORIDE 250 MG/1
250 TABLET ORAL DAILY
COMMUNITY
End: 2017-12-11 | Stop reason: HOSPADM

## 2017-12-07 RX ORDER — FLUCONAZOLE 2 MG/ML
INJECTION, SOLUTION INTRAVENOUS
Status: DISPENSED
Start: 2017-12-07 | End: 2017-12-08

## 2017-12-07 RX ORDER — ONDANSETRON 4 MG/1
4 TABLET, FILM COATED ORAL EVERY 6 HOURS PRN
Status: DISCONTINUED | OUTPATIENT
Start: 2017-12-07 | End: 2017-12-11 | Stop reason: HOSPADM

## 2017-12-07 RX ORDER — FLUCONAZOLE 2 MG/ML
200 INJECTION, SOLUTION INTRAVENOUS DAILY
Status: DISCONTINUED | OUTPATIENT
Start: 2017-12-07 | End: 2017-12-09

## 2017-12-07 RX ORDER — ONDANSETRON 4 MG/1
4 TABLET, ORALLY DISINTEGRATING ORAL EVERY 6 HOURS PRN
Status: DISCONTINUED | OUTPATIENT
Start: 2017-12-07 | End: 2017-12-11 | Stop reason: HOSPADM

## 2017-12-07 RX ORDER — NICOTINE 21 MG/24HR
1 PATCH, TRANSDERMAL 24 HOURS TRANSDERMAL EVERY 24 HOURS
Status: DISCONTINUED | OUTPATIENT
Start: 2017-12-07 | End: 2017-12-11 | Stop reason: HOSPADM

## 2017-12-07 RX ORDER — FLUCONAZOLE 100 MG/1
TABLET ORAL
Status: DISCONTINUED
Start: 2017-12-07 | End: 2017-12-07 | Stop reason: WASHOUT

## 2017-12-07 RX ORDER — ACETAMINOPHEN 325 MG/1
650 TABLET ORAL EVERY 4 HOURS PRN
Status: DISCONTINUED | OUTPATIENT
Start: 2017-12-07 | End: 2017-12-11 | Stop reason: HOSPADM

## 2017-12-07 RX ORDER — SODIUM CHLORIDE 0.9 % (FLUSH) 0.9 %
1-10 SYRINGE (ML) INJECTION AS NEEDED
Status: DISCONTINUED | OUTPATIENT
Start: 2017-12-07 | End: 2017-12-11 | Stop reason: HOSPADM

## 2017-12-07 RX ORDER — METHYLPREDNISOLONE SODIUM SUCCINATE 125 MG/2ML
60 INJECTION, POWDER, LYOPHILIZED, FOR SOLUTION INTRAMUSCULAR; INTRAVENOUS EVERY 8 HOURS
Status: DISCONTINUED | OUTPATIENT
Start: 2017-12-07 | End: 2017-12-09

## 2017-12-07 RX ORDER — ONDANSETRON 2 MG/ML
4 INJECTION INTRAMUSCULAR; INTRAVENOUS EVERY 6 HOURS PRN
Status: DISCONTINUED | OUTPATIENT
Start: 2017-12-07 | End: 2017-12-11 | Stop reason: HOSPADM

## 2017-12-07 RX ORDER — NYSTATIN 100000 U/G
OINTMENT TOPICAL EVERY 12 HOURS SCHEDULED
Status: DISCONTINUED | OUTPATIENT
Start: 2017-12-07 | End: 2017-12-11 | Stop reason: HOSPADM

## 2017-12-07 RX ADMIN — FLUCONAZOLE 400 MG: 400 INJECTION, SOLUTION INTRAVENOUS at 15:14

## 2017-12-07 RX ADMIN — PERPHENAZINE 4 MG: 4 TABLET, FILM COATED ORAL at 21:34

## 2017-12-07 RX ADMIN — SODIUM CHLORIDE 1000 ML: 9 INJECTION, SOLUTION INTRAVENOUS at 17:28

## 2017-12-07 RX ADMIN — IPRATROPIUM BROMIDE AND ALBUTEROL SULFATE 3 ML: .5; 3 SOLUTION RESPIRATORY (INHALATION) at 14:24

## 2017-12-07 RX ADMIN — METHYLPREDNISOLONE SODIUM SUCCINATE 60 MG: 40 INJECTION, POWDER, FOR SOLUTION INTRAMUSCULAR; INTRAVENOUS at 21:34

## 2017-12-07 RX ADMIN — VANCOMYCIN HYDROCHLORIDE 2500 MG: 500 INJECTION, POWDER, LYOPHILIZED, FOR SOLUTION INTRAVENOUS at 17:21

## 2017-12-07 RX ADMIN — ENOXAPARIN SODIUM 30 MG: 30 INJECTION SUBCUTANEOUS at 21:34

## 2017-12-07 RX ADMIN — MEROPENEM 1 G: 1 INJECTION, POWDER, FOR SOLUTION INTRAVENOUS at 21:33

## 2017-12-07 RX ADMIN — SODIUM CHLORIDE 125 ML/HR: 9 INJECTION, SOLUTION INTRAVENOUS at 16:26

## 2017-12-07 RX ADMIN — SODIUM CHLORIDE 500 ML: 9 INJECTION, SOLUTION INTRAVENOUS at 16:39

## 2017-12-07 RX ADMIN — NICOTINE 1 PATCH: 21 PATCH TRANSDERMAL at 21:35

## 2017-12-07 RX ADMIN — MEROPENEM 1 G: 1 INJECTION, POWDER, FOR SOLUTION INTRAVENOUS at 14:35

## 2017-12-07 RX ADMIN — NYSTATIN: 100000 POWDER TOPICAL at 21:35

## 2017-12-07 RX ADMIN — METHYLPREDNISOLONE SODIUM SUCCINATE 60 MG: 125 INJECTION, POWDER, FOR SOLUTION INTRAMUSCULAR; INTRAVENOUS at 21:34

## 2017-12-07 RX ADMIN — ALLOPURINOL 300 MG: 300 TABLET ORAL at 21:34

## 2017-12-07 RX ADMIN — SODIUM CHLORIDE 1000 ML: 9 INJECTION, SOLUTION INTRAVENOUS at 14:13

## 2017-12-07 RX ADMIN — DIVALPROEX SODIUM 500 MG: 250 TABLET, DELAYED RELEASE ORAL at 22:56

## 2017-12-07 RX ADMIN — DIVALPROEX SODIUM 500 MG: 500 TABLET, DELAYED RELEASE ORAL at 22:56

## 2017-12-07 NOTE — ED NOTES
Called Dr. Reinoso cell. Call rang once then hung up.      Pasha Broderick  12/07/17 1702       Pasha Broderick  12/07/17 1703       Pasha Broderick  12/07/17 1703

## 2017-12-07 NOTE — ED PROVIDER NOTES
"Subjective   History of Present Illness  History of Present Illness    Chief complaint: rash    Location: lower abd    Quality/Severity:  \"pain\" severe    Timing/Duration: 4 days    Modifying Factors: nothing makes worse or better    Associated Symptoms: Positive diarrhea for the past couple of days.  Positive anorexia times one month with a 20 pound weight loss over one month.  Denies fevers or chills.  Denies nausea or vomiting.  Positive chronic cough with clear sputum production, unchanged.  Denies shortness of breath.  Positive lightheadedness    Narrative: 47-year-old female presents from her primary care provider's office for admission for a \"rash\".  She stated that her white blood cell count was all \"out of whack\".  She states she has not really been feeling well for the past month and has had a 20 pound weight loss.  The rash started a few days ago and has been continually worse.    Review of Systems  General: Denies fevers or chills.  Denies any weakness or fatigue.  Denies any weight loss or weight gain.  SKIN: Denies any rashes lesions or ulcers.  Denies color change.  ENT: Denies sore throat or rhinorrhea.  Denies ear pain.    EYES: Denies any blurred vision.  Denies any change in vision.  Denies any photophobia.  Denies any vision loss.  LUNGS: Denies any shortness of breath or wheezing.  Denies any cough.  Denies any hemoptysis.  CARDIAC: Denies any chest pain.  Denies palpitations.  Denies syncope.  Denies any edema  ABD: Denies any abdominal pain.  Denies any nausea or vomiting or diarrhea.  Denies any rectal bleeding.  Denies constipation  : Denies any dysuria, urgency, frequency or hematuria.  Denies discharge.  Denies flank pain.  NEURO: Denies any focal weakness.  Denies headache.  Denies seizures.  Denies changes in speech or difficulty walking.  ENDOCRINE: Denies polydipsia and polyuria  M/S: Denies arthralgias, back pain, myalgias or neck pain  HEME/LYMPH: Negative for adenopathy. Does not " bruise/bleed easily.   PSYCH: Negative for suicidal ideas. Denies anxiety or depression  review was performed in addition to those in the above all other reviews are negative.      Past Medical History:   Diagnosis Date   • COPD (chronic obstructive pulmonary disease)    • Diabetes    • Gout    • HTN (hypertension)    • Hyperlipidemia    • Hyperthyroidism    • Neuropathy    • Sleep apnea        Allergies   Allergen Reactions   • Penicillins        Past Surgical History:   Procedure Laterality Date   • MANDIBLE FRACTURE SURGERY         Family History   Problem Relation Age of Onset   • Cervical cancer Maternal Grandmother    • Cancer Maternal Grandmother    • Hypertension Father    • Heart disease Father        Social History     Social History   • Marital status:      Spouse name: N/A   • Number of children: N/A   • Years of education: N/A     Social History Main Topics   • Smoking status: Current Every Day Smoker     Packs/day: 2.00   • Smokeless tobacco: Not on file   • Alcohol use No   • Drug use: Not on file   • Sexual activity: Not on file     Other Topics Concern   • Not on file     Social History Narrative   No current facility-administered medications for this encounter.     Current Outpatient Prescriptions:   •  allopurinol (ZYLOPRIM) 300 MG tablet, Take 300 mg by mouth Daily., Disp: , Rfl:   •  amphetamine-dextroamphetamine (ADDERALL) 20 MG tablet, Take 20 mg by mouth Daily., Disp: , Rfl:   •  divalproex (DEPAKOTE) 500 MG 24 hr tablet, , Disp: , Rfl:   •  divalproex (DEPAKOTE) 500 MG DR tablet, Take 500 mg by mouth 3 (Three) Times a Day., Disp: , Rfl:   •  eszopiclone (LUNESTA) 3 MG tablet, , Disp: , Rfl:   •  furosemide (LASIX) 20 MG tablet, Take 20 mg by mouth 2 (Two) Times a Day., Disp: , Rfl:   •  levothyroxine (SYNTHROID, LEVOTHROID) 25 MCG tablet, Take 25 mcg by mouth Daily., Disp: , Rfl:   •  nystatin (MYCOSTATIN) 439266 UNIT/GM powder, Apply  topically 2 (Two) Times a Day. APPLY TO AFFECTED  AREA TWICE DAILY X 14 DAYS, Disp: 30 g, Rfl: 1  •  olmesartan (BENICAR) 40 MG tablet, , Disp: , Rfl:   •  OLMESARTAN MEDOXOMIL-HCTZ PO, Take  by mouth., Disp: , Rfl:   •  Omega-3 Fatty Acids (OMEGA 3 PO), Take  by mouth., Disp: , Rfl:   •  perphenazine (TRILAFON) 4 MG tablet, Take 4 mg by mouth 2 (Two) Times a Day., Disp: , Rfl:   •  pravastatin (PRAVACHOL) 40 MG tablet, Take 40 mg by mouth Daily., Disp: , Rfl:   •  risperiDONE (risperDAL) 2 MG tablet, , Disp: , Rfl:   •  spironolactone (ALDACTONE) 25 MG tablet, Take 25 mg by mouth Daily., Disp: , Rfl:   •  traMADol (ULTRAM) 50 MG tablet, Take 50 mg by mouth Every 6 (Six) Hours As Needed for Moderate Pain ., Disp: , Rfl:           Objective   Physical Exam  Vitals:    12/07/17 1142   BP: 108/74   Pulse: 93   Resp: 20   Temp: 98 °F (36.7 °C)   SpO2: 92%     GENERAL: a/o x 4, NAD  SKIN: Warm pink and dry, R groin erythema and odor c/w yeast  HEENT:  PERRLA, EOM intact, conjunctiva normal, sclera clear  NECK: supple, no JVD  LUNGS: Clear to auscultation bilaterally without wheezes, rales or rhonchi.  No accessory muscle use and no nasal flaring.  CARDIAC:  Regular rate and rhythm, S1-S2.  No murmurs, rubs or gallops.  No peripheral edema.  Equal pulses bilaterally.  ABDOMEN: Soft, nontender, nondistended.  No guarding or rebound tenderness.  Normal bowel sounds.  MUSCULOSKELETAL: Moves all extremities well.  No deformity.  NEURO: Cranial nerves II through XII grossly intact.  No gross focal deficits.  Alert.  Normal speech and motor.  PSYCH: Normal mood and affect      Procedures         ED Course  ED Course    Patient seen in triage is no beds were available    Reviewed CT a/p, CXR. Independently viewed by me. Interpreted by radiologist. Discussed with pt.  Ct Abdomen Pelvis Without Contrast    Result Date: 12/7/2017  Narrative: CT ABDOMEN/PELVIS, NONCONTRAST, 12/07/2017:  HISTORY: 47-year-old female in the ED complaining of four day history of lower abdomen pain.   TECHNIQUE: CT examination of the abdomen and pelvis without oral or IV contrast using kidney stone protocol, as requested. Radiation dose reduction techniques were utilized, including automated exposure control and exposure modulation based on body size.  COMPARISON: *  CT abdomen/pelvis, 07/20/2017.  ABDOMEN FINDINGS: Both kidneys, both ureters and the urinary bladder are normal in noncontrast CT appearance. No visible nephrolithiasis or evidence of urinary obstruction.  No gallbladder distention or bile duct dilatation. Liver, pancreas and spleen are normal in size and appearance without contrast.  Small bowel and colon are normal in caliber and appearance, as imaged. Normal appendix.  Skin thickening in the inferior portion of the large abdominal pannus with some subcutaneous soft tissue stranding on the right may represent skin inflammation/cellulitis. No abscess or other fluid collection is demonstrated.  PELVIS FINDINGS: Uterus, ovaries, urinary bladder and rectum are within normal limits.  Limited lung base images show linear atelectasis or scarring in the left posterior costophrenic angle.      Impression: 1. No acute abnormality within the abdomen or pelvis. 2. Possible skin inflammation in the right lower quadrant abdominal wall. No visible abscess.  This report was finalized on 12/7/2017 1:29 PM by Dr. Wolf Flanagan MD.      Xr Chest 2 View    Result Date: 12/7/2017  Narrative: CHEST X-RAY, 12/07/2017:  HISTORY: 47-year-old female in the ED complaining of one history of shortness of air and cough.  TECHNIQUE: PA and lateral upright chest series.  FINDINGS: The lungs are expanded and clear. Heart size and pulmonary vascularity are within normal limits. No visible pulmonary infiltrate or pleural effusion. Mild thoracolumbar spinal curvature.      Impression: No active disease.  This report was finalized on 12/7/2017 12:24 PM by Dr. Wolf Flanagan MD.      Xr Knee 3 View Bilateral    Impression:  Ordering physician's impression is located in the Encounter Note dated 11/17/17.     Vanc, merrem (hoang recommendations) given (pcn allergy with mild rash as reaction) and fluconazole (supsect possible candida).  Placed on oxygen.  She states she is supposed to wear it at home, but she smokes 2 and half packs per day, since she does not wear her oxygen.  No evidence of pulmonary edema on x-ray.  X-ray unremarkable. Duoneb given.    Results for orders placed or performed during the hospital encounter of 12/07/17   Protime-INR   Result Value Ref Range    Protime 12.9 12.1 - 15.0 Seconds    INR 0.97 0.90 - 1.10   aPTT   Result Value Ref Range    PTT 34.9 24.3 - 38.1 seconds   Lactic Acid, Plasma   Result Value Ref Range    Lactate 2.0 0.5 - 2.0 mmol/L   CBC Auto Differential   Result Value Ref Range    WBC 18.97 (H) 4.80 - 10.80 10*3/mm3    RBC 5.17 4.20 - 5.40 10*6/mm3    Hemoglobin 14.0 12.0 - 16.0 g/dL    Hematocrit 43.6 37.0 - 47.0 %    MCV 84.3 81.0 - 99.0 fL    MCH 27.1 27.0 - 31.0 pg    MCHC 32.1 31.0 - 37.0 g/dL    RDW 17.5 (H) 11.5 - 14.5 %    RDW-SD 52.3 37.0 - 54.0 fl    MPV 8.5 7.4 - 10.4 fL    Platelets 441 140 - 500 10*3/mm3    Neutrophil % 68.5 45.0 - 70.0 %    Lymphocyte % 20.5 20.0 - 45.0 %    Monocyte % 4.2 3.0 - 8.0 %    Eosinophil % 1.2 0.0 - 4.0 %    Basophil % 0.8 0.0 - 2.0 %    Immature Grans % 4.8 (H) 0.0 - 0.5 %    Neutrophils, Absolute 13.01 (H) 1.50 - 8.30 10*3/mm3    Lymphocytes, Absolute 3.88 0.60 - 4.80 10*3/mm3    Monocytes, Absolute 0.79 0.00 - 1.00 10*3/mm3    Eosinophils, Absolute 0.22 0.10 - 0.30 10*3/mm3    Basophils, Absolute 0.15 0.00 - 0.20 10*3/mm3    Immature Grans, Absolute 0.92 (H) 0.00 - 0.03 10*3/mm3    nRBC 0.0 0.0 - 0.0 /100 WBC   Urine only 100 out with Only placement.  IV fluid boluses given.  After 1-1/2 L patient now having approximately 50 mL's/hour.  Discussed pertinent labs and imaging findings with the patient/family.  Patient/Family voiced understanding of need  to follow-up for recheck, further testing as needed.  Return to the emergency Department warnings were given.    D/w Dr. Reinoso who will admit to tele.            MDM  Number of Diagnoses or Management Options  GUNJAN (acute kidney injury): new and requires workup  Cellulitis of abdominal wall: new and requires workup  Dehydration: new and requires workup     Amount and/or Complexity of Data Reviewed  Clinical lab tests: reviewed and ordered  Tests in the radiology section of CPT®: reviewed and ordered  Tests in the medicine section of CPT®: ordered and reviewed  Decide to obtain previous medical records or to obtain history from someone other than the patient: yes  Obtain history from someone other than the patient: yes  Discuss the patient with other providers: yes  Independent visualization of images, tracings, or specimens: yes    Risk of Complications, Morbidity, and/or Mortality  Presenting problems: moderate  Diagnostic procedures: moderate  Management options: moderate    Critical Care  Total time providing critical care: < 30 minutes    Patient Progress  Patient progress: improved      Final diagnoses:   GUNJAN (acute kidney injury)   Dehydration   Cellulitis of abdominal wall       Dictated utilizing Dragon dictation       Ceci De La Garza PA-C  12/07/17 7041

## 2017-12-08 LAB
ANION GAP SERPL CALCULATED.3IONS-SCNC: 10.9 MMOL/L
BUN BLD-MCNC: 11 MG/DL (ref 6–20)
BUN/CREAT SERPL: 9.9 (ref 7–25)
CALCIUM SPEC-SCNC: 8.5 MG/DL (ref 8.6–10.5)
CHLORIDE SERPL-SCNC: 95 MMOL/L (ref 98–107)
CO2 SERPL-SCNC: 26.1 MMOL/L (ref 22–29)
CREAT BLD-MCNC: 1.11 MG/DL (ref 0.57–1)
DEPRECATED RDW RBC AUTO: 52.5 FL (ref 37–54)
ERYTHROCYTE [DISTWIDTH] IN BLOOD BY AUTOMATED COUNT: 17 % (ref 11.5–14.5)
GFR SERPL CREATININE-BSD FRML MDRD: 52 ML/MIN/1.73
GLUCOSE BLD-MCNC: 130 MG/DL (ref 65–99)
GLUCOSE BLDC GLUCOMTR-MCNC: 147 MG/DL (ref 70–130)
GLUCOSE BLDC GLUCOMTR-MCNC: 154 MG/DL (ref 70–130)
GLUCOSE BLDC GLUCOMTR-MCNC: 164 MG/DL (ref 70–130)
GLUCOSE BLDC GLUCOMTR-MCNC: 207 MG/DL (ref 70–130)
HBA1C MFR BLD: 5.5 % (ref 4.8–5.6)
HCT VFR BLD AUTO: 41.3 % (ref 37–47)
HGB BLD-MCNC: 13.1 G/DL (ref 12–16)
MCH RBC QN AUTO: 27 PG (ref 27–31)
MCHC RBC AUTO-ENTMCNC: 31.7 G/DL (ref 31–37)
MCV RBC AUTO: 85 FL (ref 81–99)
PLATELET # BLD AUTO: 412 10*3/MM3 (ref 140–500)
PMV BLD AUTO: 8.7 FL (ref 7.4–10.4)
POTASSIUM BLD-SCNC: 4.3 MMOL/L (ref 3.5–5.2)
RBC # BLD AUTO: 4.86 10*6/MM3 (ref 4.2–5.4)
SODIUM BLD-SCNC: 132 MMOL/L (ref 136–145)
VALPROATE SERPL-MCNC: 26.1 MCG/ML (ref 50–100)
WBC NRBC COR # BLD: 15.64 10*3/MM3 (ref 4.8–10.8)

## 2017-12-08 PROCEDURE — 82962 GLUCOSE BLOOD TEST: CPT

## 2017-12-08 PROCEDURE — 94640 AIRWAY INHALATION TREATMENT: CPT

## 2017-12-08 PROCEDURE — 80164 ASSAY DIPROPYLACETIC ACD TOT: CPT | Performed by: FAMILY MEDICINE

## 2017-12-08 PROCEDURE — 25010000002 ENOXAPARIN PER 10 MG: Performed by: FAMILY MEDICINE

## 2017-12-08 PROCEDURE — 25010000002 METHYLPREDNISOLONE PER 40 MG

## 2017-12-08 PROCEDURE — 63710000001 INSULIN ASPART PER 5 UNITS: Performed by: FAMILY MEDICINE

## 2017-12-08 PROCEDURE — 83036 HEMOGLOBIN GLYCOSYLATED A1C: CPT | Performed by: FAMILY MEDICINE

## 2017-12-08 PROCEDURE — 85027 COMPLETE CBC AUTOMATED: CPT | Performed by: FAMILY MEDICINE

## 2017-12-08 PROCEDURE — 80048 BASIC METABOLIC PNL TOTAL CA: CPT | Performed by: FAMILY MEDICINE

## 2017-12-08 PROCEDURE — 25010000002 METHYLPREDNISOLONE PER 125 MG: Performed by: FAMILY MEDICINE

## 2017-12-08 PROCEDURE — 94799 UNLISTED PULMONARY SVC/PX: CPT

## 2017-12-08 PROCEDURE — 25010000002 VANCOMYCIN 1500 MG/250ML SOLUTION: Performed by: FAMILY MEDICINE

## 2017-12-08 PROCEDURE — 25010000002 FLUCONAZOLE PER 200 MG: Performed by: FAMILY MEDICINE

## 2017-12-08 PROCEDURE — 25010000002 MEROPENEM PER 100 MG: Performed by: FAMILY MEDICINE

## 2017-12-08 RX ORDER — MEROPENEM 1 G/1
INJECTION, POWDER, FOR SOLUTION INTRAVENOUS
Status: DISPENSED
Start: 2017-12-08 | End: 2017-12-08

## 2017-12-08 RX ORDER — VANCOMYCIN HCL-SODIUM CHLORIDE IV SOLN 1.5 GM/250ML-0.9% 1.5-0.9/25 GM/ML-%
1500 SOLUTION INTRAVENOUS EVERY 12 HOURS
Status: DISCONTINUED | OUTPATIENT
Start: 2017-12-08 | End: 2017-12-10

## 2017-12-08 RX ORDER — METHYLPREDNISOLONE SODIUM SUCCINATE 40 MG/ML
INJECTION, POWDER, LYOPHILIZED, FOR SOLUTION INTRAMUSCULAR; INTRAVENOUS
Status: COMPLETED
Start: 2017-12-08 | End: 2017-12-08

## 2017-12-08 RX ORDER — DEXTROSE MONOHYDRATE 25 G/50ML
25 INJECTION, SOLUTION INTRAVENOUS
Status: DISCONTINUED | OUTPATIENT
Start: 2017-12-08 | End: 2017-12-11 | Stop reason: HOSPADM

## 2017-12-08 RX ORDER — IPRATROPIUM BROMIDE AND ALBUTEROL SULFATE 2.5; .5 MG/3ML; MG/3ML
3 SOLUTION RESPIRATORY (INHALATION)
Status: DISCONTINUED | OUTPATIENT
Start: 2017-12-08 | End: 2017-12-11 | Stop reason: HOSPADM

## 2017-12-08 RX ORDER — NICOTINE POLACRILEX 4 MG
15 LOZENGE BUCCAL
Status: DISCONTINUED | OUTPATIENT
Start: 2017-12-08 | End: 2017-12-11 | Stop reason: HOSPADM

## 2017-12-08 RX ORDER — SODIUM CHLORIDE 9 MG/ML
INJECTION, SOLUTION INTRAVENOUS
Status: DISPENSED
Start: 2017-12-08 | End: 2017-12-08

## 2017-12-08 RX ADMIN — LEVOTHYROXINE SODIUM 50 MCG: 50 TABLET ORAL at 06:24

## 2017-12-08 RX ADMIN — METHYLPREDNISOLONE SODIUM SUCCINATE 60 MG: 40 INJECTION, POWDER, FOR SOLUTION INTRAMUSCULAR; INTRAVENOUS at 04:50

## 2017-12-08 RX ADMIN — ENOXAPARIN SODIUM 30 MG: 30 INJECTION SUBCUTANEOUS at 19:04

## 2017-12-08 RX ADMIN — METHYLPREDNISOLONE SODIUM SUCCINATE 60 MG: 125 INJECTION, POWDER, FOR SOLUTION INTRAMUSCULAR; INTRAVENOUS at 20:57

## 2017-12-08 RX ADMIN — MEROPENEM 1 G: 1 INJECTION, POWDER, FOR SOLUTION INTRAVENOUS at 13:30

## 2017-12-08 RX ADMIN — MEROPENEM 1 G: 1 INJECTION, POWDER, FOR SOLUTION INTRAVENOUS at 20:58

## 2017-12-08 RX ADMIN — VANCOMYCIN HCL-SODIUM CHLORIDE IV SOLN 1.5 GM/250ML-0.9% 1500 MG: 1.5-0.9/25 SOLUTION at 12:53

## 2017-12-08 RX ADMIN — IPRATROPIUM BROMIDE AND ALBUTEROL SULFATE 3 ML: .5; 3 SOLUTION RESPIRATORY (INHALATION) at 19:58

## 2017-12-08 RX ADMIN — METHYLPREDNISOLONE SODIUM SUCCINATE 60 MG: 125 INJECTION, POWDER, FOR SOLUTION INTRAMUSCULAR; INTRAVENOUS at 04:50

## 2017-12-08 RX ADMIN — VANCOMYCIN HCL-SODIUM CHLORIDE IV SOLN 1.5 GM/250ML-0.9% 1500 MG: 1.5-0.9/25 SOLUTION at 21:46

## 2017-12-08 RX ADMIN — MEROPENEM 1 G: 1 INJECTION, POWDER, FOR SOLUTION INTRAVENOUS at 04:50

## 2017-12-08 RX ADMIN — NYSTATIN: 100000 OINTMENT TOPICAL at 21:03

## 2017-12-08 RX ADMIN — TRAMADOL HYDROCHLORIDE 50 MG: 50 TABLET, FILM COATED ORAL at 21:41

## 2017-12-08 RX ADMIN — IPRATROPIUM BROMIDE AND ALBUTEROL SULFATE 3 ML: .5; 3 SOLUTION RESPIRATORY (INHALATION) at 15:37

## 2017-12-08 RX ADMIN — DIVALPROEX SODIUM 500 MG: 500 TABLET, DELAYED RELEASE ORAL at 08:30

## 2017-12-08 RX ADMIN — IPRATROPIUM BROMIDE AND ALBUTEROL SULFATE 3 ML: .5; 3 SOLUTION RESPIRATORY (INHALATION) at 08:16

## 2017-12-08 RX ADMIN — NYSTATIN: 100000 POWDER TOPICAL at 21:03

## 2017-12-08 RX ADMIN — NICOTINE 1 PATCH: 21 PATCH TRANSDERMAL at 19:04

## 2017-12-08 RX ADMIN — DIVALPROEX SODIUM 500 MG: 500 TABLET, DELAYED RELEASE ORAL at 21:00

## 2017-12-08 RX ADMIN — ALLOPURINOL 300 MG: 300 TABLET ORAL at 08:30

## 2017-12-08 RX ADMIN — IPRATROPIUM BROMIDE AND ALBUTEROL SULFATE 3 ML: .5; 3 SOLUTION RESPIRATORY (INHALATION) at 11:19

## 2017-12-08 RX ADMIN — METHYLPREDNISOLONE SODIUM SUCCINATE 60 MG: 125 INJECTION, POWDER, FOR SOLUTION INTRAMUSCULAR; INTRAVENOUS at 13:29

## 2017-12-08 RX ADMIN — NYSTATIN: 100000 POWDER TOPICAL at 08:30

## 2017-12-08 RX ADMIN — PERPHENAZINE 4 MG: 4 TABLET, FILM COATED ORAL at 19:05

## 2017-12-08 RX ADMIN — INSULIN ASPART 2 UNITS: 100 INJECTION, SOLUTION INTRAVENOUS; SUBCUTANEOUS at 20:58

## 2017-12-08 RX ADMIN — INSULIN ASPART 3 UNITS: 100 INJECTION, SOLUTION INTRAVENOUS; SUBCUTANEOUS at 12:53

## 2017-12-08 RX ADMIN — PERPHENAZINE 4 MG: 4 TABLET, FILM COATED ORAL at 08:30

## 2017-12-08 RX ADMIN — NYSTATIN: 100000 OINTMENT TOPICAL at 08:29

## 2017-12-08 RX ADMIN — TRAMADOL HYDROCHLORIDE 50 MG: 50 TABLET, FILM COATED ORAL at 13:28

## 2017-12-08 RX ADMIN — DIVALPROEX SODIUM 500 MG: 500 TABLET, DELAYED RELEASE ORAL at 19:04

## 2017-12-08 RX ADMIN — FLUCONAZOLE 200 MG: 2 INJECTION, SOLUTION INTRAVENOUS at 08:30

## 2017-12-08 RX ADMIN — SODIUM CHLORIDE 125 ML/HR: 9 INJECTION, SOLUTION INTRAVENOUS at 20:59

## 2017-12-08 NOTE — NURSING NOTE
"Discharge Planning Assessment  MYAH Vieira     Patient Name: Kirsten Centeno  MRN: [id number]  Today's Date: 12/8/2017    Admit Date: 12/7/2017          Discharge Needs Assessment       12/08/17 1033    Living Environment    Lives With spouse    Living Arrangements house    Home Accessibility stairs to enter home;bed and bath on same level   There is an upstairs but patient stays on the first floor.  There is bed and bath on first floor.    Number of Stairs to Enter Home 1    Stair Railings at Home none    Type of Financial/Environmental Concern none    Transportation Available car;family or friend will provide    Living Environment    Provides Primary Care For no one, unable/limited ability to care for self    Primary Care Provided By spouse/significant other    Quality Of Family Relationships supportive;helpful;involved    Able to Return to Prior Living Arrangements yes    Discharge Needs Assessment    Concerns To Be Addressed discharge planning concerns    Readmission Within The Last 30 Days no previous admission in last 30 days    Anticipated Changes Related to Illness none    Equipment Currently Used at Home oxygen;respiratory supplies;bipap/ cpap;walker, rolling;cane, straight    Equipment Needed After Discharge none    Discharge Planning Comments Spoke with Mrs Centeno at bedside.  She lives in a house with her  Rick.  The house one step to enter - she states she can manage that but \"I don't go upstairs in the house.\"  She has had [facility] in the past.  Her DME is: Rolling walker,  Oxygen and CPAP provided by [facility].  Verified oxygen at 2l/min/nc nocturnal per Liza - patient states hse wears it continuously..  She drives short distances but \"If we have to go to town my  drives.\" Her pharmacy  is  [facility]  and there are no issues with paying for prescriptions.  Plan will be home when stable.  Will continue to follow            Discharge Plan       12/08/17 1043    Case Management/Social " "Work Plan    Plan Home when stable    Patient/Family In Agreement With Plan yes    Additional Comments Spoke with Mrs Centeno at bedside.  She lives in a house with her  Rick.  The house one step to enter - she states she can manage that but \"I don't go upstairs in the house.\"  She has had [facility] in the past.  Her DME is: Rolling walker,  [facility]  Verified oxygen at 2l/min/nc nocturnal per Liza - patient states hse wears it continuously..  She drives short distances but \"If we have to go to town my  drives.\" Her pharmacy  is  [facility]  and there are no issues with paying for prescriptions.  Plan will be home when stable.  Will continue to follow        Discharge Placement     No information found                Demographic Summary       12/08/17 1031    Referral Information    Admission Type inpatient    Arrived From home or self-care    Referral Source admission list    Reason For Consult discharge planning    Record Reviewed medical record    Contact Information    Permission Granted to Share Information With             Functional Status     None            Psychosocial     None            Abuse/Neglect     None            Legal     None            Substance Abuse     None            Patient Forms     None          Melina Mello RN    "

## 2017-12-08 NOTE — NURSING NOTE
WOCN consult for cellulitis/yeast to pannus and perineal areas.  Significant erythema and inflammation to these areas along with moisture and intertrigo.  Patient already has several IV abx and antifungal meds on board along with nystatin powder and cream.  This is appropriate, would also recommend crusting with nystatin powder and z guard and using ABD pads as needed to wick moisture.  Discussed with ARPIT Davis.

## 2017-12-08 NOTE — H&P
HISTORY AND PHYSICAL      Patient Care Team:  Rob Cunha MD as PCP - General  Rob Cunha MD as PCP - Family Medicine    CHIEF COMPLAINT: Abnormal pain rash    HISTORY OF PRESENT ILLNESS:    47-year-old white female patient who I saw in the office yesterday with complaints of abdominal pain nausea and some diarrhea.  She says she noticed increasing pain and a rash abdominal fullness that started about 4-5 days ago it got progressively worse.  She's had some mild diarrhea noted and been nauseated no vomiting no fever chills she does complain of some generalized malaise.  She was found to have moderately severe abdominal cellulitis in her folds likely fungal with secondary bacterial infection was placed on oral antifungals as well as oral antibiotics.  She returned today stating the office stating she did not feel any better and her white count was elevated to 20,007 was sent to the emergency room for evaluation treatment.    Past Medical History:    1.  Hypertension.   2.  Hyperlipidemia.   3.  Bipolar disorder.   4.  Generalized anxiety disorder.  5.  Hypothyroidism  6.  Diabetes.  7.   obstructive sleep apnea  8.  Hyperuricemia.  9.  COPD  Past Surgical History:   Procedure Laterality Date   • MANDIBLE FRACTURE SURGERY     Bilateral tubal ligation  Family History   Problem Relation Age of Onset   • Cervical cancer Maternal Grandmother    • Cancer Maternal Grandmother    • Hypertension Father    • Heart disease Father      Social History   Substance Use Topics   • Smoking status: Current Every Day Smoker     Packs/day: 2.00   • Smokeless tobacco: None   • Alcohol use No     Prescriptions Prior to Admission   Medication Sig Dispense Refill Last Dose   • allopurinol (ZYLOPRIM) 300 MG tablet Take 300 mg by mouth Daily.   12/7/2017 at Unknown time   • divalproex (DEPAKOTE) 500 MG DR tablet Take 500 mg by mouth 3 (Three) Times a Day.   12/6/2017 at Unknown time   • furosemide (LASIX) 20 MG tablet Take 20 mg by  mouth 2 (Two) Times a Day.   12/7/2017 at Unknown time   • levothyroxine (SYNTHROID, LEVOTHROID) 50 MCG tablet Take 50 mcg by mouth Daily.   Past Week at Unknown time   • nystatin (MYCOSTATIN) 265144 UNIT/GM powder Apply  topically 2 (Two) Times a Day. APPLY TO AFFECTED AREA TWICE DAILY X 14 DAYS 30 g 1 12/6/2017 at Unknown time   • olmesartan (BENICAR) 40 MG tablet    12/7/2017 at Unknown time   • Omega-3 Fatty Acids (OMEGA 3 PO) Take  by mouth.   12/7/2017 at Unknown time   • perphenazine (TRILAFON) 4 MG tablet Take 4 mg by mouth 2 (Two) Times a Day.   12/7/2017 at Unknown time   • pravastatin (PRAVACHOL) 40 MG tablet Take 40 mg by mouth Daily.   Taking   • spironolactone (ALDACTONE) 25 MG tablet Take 25 mg by mouth Daily.   12/7/2017 at Unknown time   • sulfamethoxazole-trimethoprim (BACTRIM DS,SEPTRA DS) 800-160 MG per tablet Take 1 tablet by mouth 2 (Two) Times a Day.   12/7/2017 at Unknown time   • terbinafine (lamiSIL) 250 MG tablet Take 250 mg by mouth Daily.   12/7/2017 at Unknown time   • traMADol (ULTRAM) 50 MG tablet Take 50 mg by mouth Every 6 (Six) Hours As Needed for Moderate Pain .   12/7/2017 at Unknown time   • amphetamine-dextroamphetamine (ADDERALL) 20 MG tablet Take 20 mg by mouth Daily.   More than a month at Unknown time     Allergies:  Penicillins     Review of Systems   Constitutional: Positive for activity change and fatigue. Negative for appetite change.   Respiratory: Negative for cough, chest tightness, shortness of breath and wheezing.    Cardiovascular: Negative for chest pain.   Gastrointestinal: Positive for abdominal pain and nausea. Negative for abdominal distention, diarrhea and vomiting.   Genitourinary: Negative for frequency.   Musculoskeletal: Positive for arthralgias and back pain.   Skin: Positive for rash.   Psychiatric/Behavioral: Negative for agitation.       Vital Signs  Temp:  [97.1 °F (36.2 °C)-98 °F (36.7 °C)] 97.1 °F (36.2 °C)  Heart Rate:  [] 88  Resp:   "[20-33] 22  BP: ()/(51-99) 138/79    Flowsheet Rows         First Filed Value    Admission Height  162.6 cm (64\") Documented at 12/07/2017 1142    Admission Weight  127 kg (279 lb) Documented at 12/07/2017 1142             Physical Exam   Constitutional: She appears well-developed and well-nourished.   Morbidly obese   HENT:   Head: Normocephalic.   Mouth/Throat: Oropharynx is clear and moist.   Eyes: Conjunctivae are normal.   Neck: Normal range of motion. No JVD present. No thyromegaly present.   Cardiovascular: Normal rate, regular rhythm and normal heart sounds.    No murmur heard.  Pulmonary/Chest: Effort normal and breath sounds normal. No respiratory distress. She has no wheezes. She has no rales.   Abdominal: Soft. Bowel sounds are normal. She exhibits no distension. There is no tenderness. There is no guarding.   Neurological: She is alert.   Skin: Skin is warm and dry. Rash noted.   Lower abdominal folds with generalized erythema induration and tenderness large pannus     Results Review:    I reviewed the patient's new clinical results.  Lab Results (most recent)     Procedure Component Value Units Date/Time    Blood Culture - Blood, [[id number]] Collected:  12/07/17 1351    Specimen:  Blood from Arm, Right Updated:  12/07/17 1402    CBC & Differential [[id number]] Collected:  12/07/17 1351    Specimen:  Blood Updated:  12/07/17 1409    Narrative:       The following orders were created for panel order CBC & Differential.  Procedure                               Abnormality         Status                     ---------                               -----------         ------                     CBC Auto Differential[[id number]]        Abnormal            Final result                 Please view results for these tests on the individual orders.    CBC Auto Differential [[id number]]  (Abnormal) Collected:  12/07/17 1351    Specimen:  Blood Updated:  12/07/17 1409     WBC 18.97 (H) 10*3/mm3      RBC 5.17 " 10*6/mm3      Hemoglobin 14.0 g/dL      Hematocrit 43.6 %      MCV 84.3 fL      MCH 27.1 pg      MCHC 32.1 g/dL      RDW 17.5 (H) %      RDW-SD 52.3 fl      MPV 8.5 fL      Platelets 441 10*3/mm3      Neutrophil % 68.5 %      Lymphocyte % 20.5 %      Monocyte % 4.2 %      Eosinophil % 1.2 %      Basophil % 0.8 %      Immature Grans % 4.8 (H) %      Neutrophils, Absolute 13.01 (H) 10*3/mm3      Lymphocytes, Absolute 3.88 10*3/mm3      Monocytes, Absolute 0.79 10*3/mm3      Eosinophils, Absolute 0.22 10*3/mm3      Basophils, Absolute 0.15 10*3/mm3      Immature Grans, Absolute 0.92 (H) 10*3/mm3      nRBC 0.0 /100 WBC     Protime-INR [[id number]]  (Normal) Collected:  12/07/17 1351    Specimen:  Blood Updated:  12/07/17 1415     Protime 12.9 Seconds      INR 0.97    Narrative:       Therapeutic Ranges for INR: 2.0-3.0 (PT 20-30)                              2.5-3.5 (PT 25-34)    aPTT [[id number]]  (Normal) Collected:  12/07/17 1351    Specimen:  Blood Updated:  12/07/17 1415     PTT 34.9 seconds     Narrative:       PTT = The equivalent PTT values for the therapeutic range of heparin levels at 0.1 to 0.7 U/ml are 53 to 110 seconds.    Blood Culture - Blood, [[id number]] Collected:  12/07/17 1424    Specimen:  Blood from Arm, Right Updated:  12/07/17 1424    Lactic Acid, Plasma [[id number]]  (Normal) Collected:  12/07/17 1351    Specimen:  Blood Updated:  12/07/17 1428     Lactate 2.0 mmol/L     Comprehensive Metabolic Panel [[id number]]  (Abnormal) Collected:  12/07/17 1351    Specimen:  Blood Updated:  12/07/17 1513     Glucose 125 (H) mg/dL      BUN 13 mg/dL      Creatinine 1.69 (H) mg/dL      Sodium 132 (L) mmol/L      Potassium 3.9 mmol/L      Chloride 86 (L) mmol/L      CO2 27.5 mmol/L      Calcium 9.2 mg/dL      Total Protein 7.1 g/dL      Albumin 3.60 g/dL      ALT (SGPT) 7 U/L      AST (SGOT) 9 U/L      Alkaline Phosphatase 97 U/L      Total Bilirubin 0.3 mg/dL      eGFR Non African Amer 32 (L) mL/min/1.73       Globulin 3.5 gm/dL      A/G Ratio 1.0 g/dL      BUN/Creatinine Ratio 7.7     Anion Gap 18.5 mmol/L     Procalcitonin [[id number]]  (Abnormal) Collected:  12/07/17 1428    Specimen:  Blood Updated:  12/07/17 1523     Procalcitonin 0.04 (L) ng/mL     Narrative:       As a Marker for Sepsis (Non-Neonates):   1. <0.5 ng/mL represents a low risk of severe sepsis and/or septic shock.  2. >2 ng/mL represents a high risk of severe sepsis and/or septic shock.    As a Marker for Lower Respiratory Tract Infections that require antibiotic therapy:    PCT on Admission     Antibiotic Therapy       6-12 Hrs later  > 0.5                Strongly Recommended             >0.25 - <0.5         Recommended  0.1 - 0.25           Discouraged              Remeasure/reassess PCT  <0.1                 Strongly Discouraged     Remeasure/reassess PCT                     PCT values of < 0.5 ng/mL do not exclude an infection, because localized infections (without systemic signs) may be associated with such low concentrations, or a systemic infection in its initial stages (< 6 hours). Furthermore, increased PCT can occur without infection. PCT concentrations between 0.5 and 2.0 ng/mL should be interpreted taking into account the patient's history. It is recommended to retest PCT within 6-24 hours if any concentrations < 2 ng/mL are obtained.    Urinalysis With / Culture If Indicated - Urine, Catheter [[id number]]  (Abnormal) Collected:  12/07/17 1637    Specimen:  Urine from Urine, Catheter Updated:  12/07/17 1649     Color, UA Dark Yellow (A)     Appearance, UA Clear     pH, UA 5.5     Specific Gravity, UA 1.022      Result obtained by Refractometer        Glucose, UA Negative     Ketones, UA Trace (A)     Bilirubin, UA Moderate (2+) (A)     Blood, UA Negative     Protein, UA Trace (A)     Leuk Esterase, UA Negative     Nitrite, UA Negative     Urobilinogen, UA 2.0 E.U./dL (A)    Narrative:       Urine microscopic not indicated.    TSH [[id number]]   (Normal) Collected:  12/07/17 1927    Specimen:  Blood Updated:  12/07/17 1952     TSH 1.280 mIU/mL           Imaging Results (most recent)     Procedure Component Value Units Date/Time    XR Chest 2 View [[id number]] Collected:  12/07/17 1223     Updated:  12/07/17 1226    Narrative:       CHEST X-RAY, 12/07/2017:     HISTORY:   47-year-old female in the ED complaining of one history of shortness of  air and cough.     TECHNIQUE:  PA and lateral upright chest series.     FINDINGS:  The lungs are expanded and clear. Heart size and pulmonary vascularity  are within normal limits. No visible pulmonary infiltrate or pleural  effusion. Mild thoracolumbar spinal curvature.       Impression:       No active disease.     This report was finalized on 12/7/2017 12:24 PM by Dr. Wolf Flanagan MD.       CT Abdomen Pelvis Without Contrast [[id number]] Collected:  12/07/17 1324     Updated:  12/07/17 1331    Narrative:       CT ABDOMEN/PELVIS, NONCONTRAST, 12/07/2017:     HISTORY:  47-year-old female in the ED complaining of four day history of lower  abdomen pain.     TECHNIQUE:  CT examination of the abdomen and pelvis without oral or IV contrast  using kidney stone protocol, as requested. Radiation dose reduction  techniques were utilized, including automated exposure control and  exposure modulation based on body size.     COMPARISON:  *  CT abdomen/pelvis, 07/20/2017.     ABDOMEN FINDINGS:  Both kidneys, both ureters and the urinary bladder are normal in  noncontrast CT appearance. No visible nephrolithiasis or evidence of  urinary obstruction.     No gallbladder distention or bile duct dilatation. Liver, pancreas and  spleen are normal in size and appearance without contrast.     Small bowel and colon are normal in caliber and appearance, as imaged.  Normal appendix.     Skin thickening in the inferior portion of the large abdominal pannus  with some subcutaneous soft tissue stranding on the right may represent  skin  inflammation/cellulitis. No abscess or other fluid collection is  demonstrated.     PELVIS FINDINGS:  Uterus, ovaries, urinary bladder and rectum are within normal limits.     Limited lung base images show linear atelectasis or scarring in the left  posterior costophrenic angle.       Impression:       1. No acute abnormality within the abdomen or pelvis.  2. Possible skin inflammation in the right lower quadrant abdominal  wall. No visible abscess.     This report was finalized on 12/7/2017 1:29 PM by Dr. Wolf Flanagan MD.           reviewed    ECG/EMG Results (most recent)     None        reviewed    Assessment/Plan     1.  Abdominal wall cellulitis start broad-spectrum IV antibiotics namely Merrem and vancomycin and Diflucan.      2.  Sepsis patient has not been hypotensive but has decreased urine output fluid boluses as been ordered aggressive hydration and close monitoring urine output continue Kyle catheter    3.  Acute kidney injury likely due to intravascular depletion and sepsis should improve with hydration monitor carefully    4.  Hypertension hold medications until patient improves    5.  Hypothyroidism nothing acute medications we continued    6.  COPD we'll continue mini nebs and pulmonary toilet    7.  Diabetes mellitus blood sugars are euglycemic Accu-Cheks sliding scale insulin    8.  Sleep apnea on home CPAP will be continued    9.  Tobacco abuse smoking cessation counseling was given NicoDerm patch ordered    10.  DVT prophylaxis Lovenox as been ordered  I discussed the patients findings and my recommendations with patient .     Anitra Reinoso MD  12/07/17  10:29 PM

## 2017-12-08 NOTE — PLAN OF CARE
"Problem: Patient Care Overview (Adult)  Goal: Discharge Needs Assessment  Outcome: Ongoing (interventions implemented as appropriate)    12/08/17 1033   Discharge Needs Assessment   Concerns To Be Addressed discharge planning concerns   Readmission Within The Last 30 Days no previous admission in last 30 days   Equipment Needed After Discharge none   Discharge Planning Comments Spoke with Mrs Centeno at bedside. She lives in a house with her  Rick. The house one step to enter - she states she can manage that but \"I don't go upstairs in the house.\" She has had [facility] in the past. Her DME is[facility] Rolling walker, [facility] Verified oxygen at 2l/min/nc nocturnal per Liza - patient states hse wears it continuously.. She drives short distances but \"If we have to go to town my  drives.\" Her pharmacy is [facility] and there are no issues with paying for prescriptions. Plan will be home when stable. Will continue to follow   Self-Care   Equipment Currently Used at Home oxygen;respiratory supplies;bipap/ cpap;walker, rolling;cane, straight   Living Environment   Transportation Available car;family or friend will provide   Current Health   Anticipated Changes Related to Illness none           "

## 2017-12-08 NOTE — PLAN OF CARE
Problem: Cellulitis (Adult)  Goal: Signs and Symptoms of Listed Potential Problems Will be Absent or Manageable (Cellulitis)  Outcome: Ongoing (interventions implemented as appropriate)    12/08/17 0545   Cellulitis   Problems Assessed (Cellulitis) all   Problems Present (Cellulitis) infection;situational response         Problem: Skin Integrity Impairment, Risk/Actual (Adult)  Goal: Identify Related Risk Factors and Signs and Symptoms  Outcome: Ongoing (interventions implemented as appropriate)    12/08/17 0545   Skin Integrity Impairment, Risk/Actual   Skin Integrity Impairment, Risk/Actual: Related Risk Factors edema;infection/disease process   Signs and Symptoms (Skin Integrity Impairment) edema;inflammation       Goal: Skin Integrity/Wound Healing  Outcome: Ongoing (interventions implemented as appropriate)    12/08/17 0545   Skin Integrity Impairment, Risk/Actual (Adult)   Skin Integrity/Wound Healing making progress toward outcome         Problem: Fall Risk (Adult)  Goal: Identify Related Risk Factors and Signs and Symptoms  Outcome: Ongoing (interventions implemented as appropriate)    12/08/17 0545   Fall Risk   Fall Risk: Related Risk Factors gait/mobility problems;history of falls;fatigue/slow reaction;environment unfamiliar   Fall Risk: Signs and Symptoms presence of risk factors       Goal: Absence of Falls  Outcome: Ongoing (interventions implemented as appropriate)    12/08/17 0545   Fall Risk (Adult)   Absence of Falls making progress toward outcome         Problem: Patient Care Overview (Adult)  Goal: Plan of Care Review  Outcome: Ongoing (interventions implemented as appropriate)    12/08/17 0545   Coping/Psychosocial Response Interventions   Plan Of Care Reviewed With patient   Patient Care Overview   Progress progress toward functional goals as expected       Goal: Discharge Needs Assessment  Outcome: Ongoing (interventions implemented as appropriate)    12/08/17 0545   Discharge Needs Assessment    Concerns To Be Addressed denies needs/concerns at this time   Readmission Within The Last 30 Days no previous admission in last 30 days   Self-Care   Equipment Currently Used at Home oxygen;walker, rolling;cane, straight   Living Environment   Transportation Available family or friend will provide

## 2017-12-08 NOTE — PROGRESS NOTES
"Adult Nutrition  Assessment/PES    Patient Name:  Kirsten Centeno  YOB: 1970  MRN: [id number]  Admit Date:  12/7/2017    Assessment Date:  12/8/2017    Comment: Pt left dentures at home, declines chopped/ground meats.           Reason for Assessment       12/08/17 1412    Reason for Assessment    Reason For Assessment/Visit identified at risk by screening criteria   c/o chewing issues left dentures at home by admit screen    Endocrine DM Type 2    Psychosocial Other (comment)   bipolar    Pulmonary/Critical Care COPD    Skin Cellulitis    Substance Use Tobacco            Data Eval/ Notes       12/08/17 1414     Notes    Note Spoke with pt RN present, LUZ MARIAFA, requested mealoaf but declines chopped/ground meats she will cut herself. left dentures at home. Appetite ok.             Nutrition/Diet History       12/08/17 1414    Nutrition/Diet History    Patient Reported Diet/Restrictions/Preferences regular            Anthropometrics       12/08/17 1415    Anthropometrics    RD Documented Current Weight  132 kg (291 lb 0.1 oz)    Anthropometrics (Special Considerations)    RD Calculated BMI (kg/m2) 49.7    Body Mass Index (BMI)    BMI Grade greater than 40 - obesity grade III            Labs/Tests/Procedures/Meds       12/08/17 1415    Labs/Tests/Procedures/Meds    Labs/Tests Review Reviewed;Glucose;Hgb A1C    Medication Review Reviewed, pertinent;Antibiotic;Anticoag;Insulin            Physical Findings       12/08/17 1415    Physical Findings/Assessment    Additional Documentation Physical Appearance (Group)    Physical Appearance    Overall Physical Appearance obese            Estimated/Assessed Needs       12/08/17 1415    Calculation Measurements    Weight Used For Calculations 132 kg (291 lb 0.1 oz)    Height Used for Calculations 1.626 m (5' 4\")    Estimated/Assessed Energy Needs    Energy Need Method [place]    Age 47    [facility] 1930    Estimated " Kcal Range  1816 kcal    204 gm CHO, 45% kcal     Estimated/Assessed Protein Needs    Weight Used for Protein Calculation 132 kg (291 lb 0.1 oz)    Protein (gm/kg) 0.8    0.8 Gm Protein (gm) 105.6    Estimated/Assessed Fluid Needs    Fluid Need Method RDA method    RDA Method (mL)  1816            Nutrition Prescription Ordered       12/08/17 1416    Nutrition Prescription PO    Common Modifiers Cardiac            Evaluation of Received Nutrient/Fluid Intake       12/08/17 1417    Evaluation of Received Nutrient/Fluid Intake    Nutrition Delivered Fluid Evaluation    Fluid Intake Evaluation    % Fluid Needs insufficient data    PO Evaluation    Number of Meals 1    % PO Intake 50            Problem/Interventions:        Problem 1       12/08/17 1417    Nutrition Diagnoses Problem 1    Problem 1 Overweight/Obesity    Etiology (related to) Factors Affecting Nutrition    Signs/Symptoms (evidenced by) Report/Observation;BMI    BMI Greater than 40                    Intervention Goal       12/08/17 1417    Intervention Goal    General Maintain nutrition            Nutrition Intervention       12/08/17 1417    Nutrition Intervention    RD/Tech Action Advise alternate selection;Interview for preference;Follow Tx progress            Nutrition Prescription       12/08/17 1418    Nutrition Prescription PO    PO Prescription Begin/change diet   Consider adding Consistent CHO diet restriction due to hx of DM and morbid obses            Education/Evaluation       12/08/17 1418    Education    Education Other (comment)   Told pt she is on cardiac diet and should be on DM diet as well. Denies any edu  needs at this time.     Monitor/Evaluation    Monitor Per protocol;I&O;PO intake;Pertinent labs;Weight;Food/activity diary        Electronically signed by:  Jadyn Colbert RD  12/08/17 2:19 PM

## 2017-12-08 NOTE — PROGRESS NOTES
"Daily Progress Note:    Subjective: Feels better.  Without complaints some mild abdominal pain    Flowsheet Rows         First Filed Value    Admission Height  162.6 cm (64\") Documented at 12/07/2017 1142    Admission Weight  127 kg (279 lb) Documented at 12/07/2017 1142          Patient Vitals for the past 24 hrs:   BP Temp Temp src Pulse Resp SpO2 Height Weight   12/08/17 0647 130/72 97.8 °F (36.6 °C) Oral 90 16 91 % - -   12/08/17 0455 140/90 97.9 °F (36.6 °C) Oral 95 16 92 % - -   12/07/17 2335 146/79 97.5 °F (36.4 °C) Oral 88 22 95 % - -   12/07/17 1954 138/79 97.1 °F (36.2 °C) Oral 88 22 96 % - -   12/07/17 1814 144/86 97.4 °F (36.3 °C) Oral 96 22 94 % 162.6 cm (64\") 132 kg (290 lb)   12/07/17 1747 116/51 - - 93 - (!) 72 % - -   12/07/17 1733 127/67 - - 84 - 92 % - -   12/07/17 1718 111/99 - - 83 - 92 % - -   12/07/17 1701 107/58 - - 86 - (!) 79 % - -   12/07/17 1547 111/66 - - 100 - - - -   12/07/17 1505 122/76 - - - - - - -   12/07/17 1503 122/76 - - - - - - -   12/07/17 1431 - - - 100 23 90 % - -   12/07/17 1424 - - - 100 (!) 33 (!) 89 % - -   12/07/17 1418 104/82 - - 99 - (!) 85 % - -   12/07/17 1353 95/52 - - 98 - 91 % - -   12/07/17 1337 123/88 - - - - (!) 87 % - -   12/07/17 1142 108/74 98 °F (36.7 °C) Oral 93 20 92 % 162.6 cm (64\") 127 kg (279 lb)       132 kg (290 lb)      Intake/Output Summary (Last 24 hours) at 12/08/17 0717  Last data filed at 12/08/17 0647   Gross per 24 hour   Intake             4900 ml   Output             1400 ml   Net             3500 ml       Review of Systems   Constitutional: Positive for activity change. Negative for appetite change and fatigue.   Respiratory: Negative for cough, chest tightness, shortness of breath and wheezing.    Cardiovascular: Negative for chest pain.   Gastrointestinal: Positive for abdominal pain. Negative for abdominal distention, diarrhea, nausea and vomiting.   Genitourinary: Negative for frequency.   Skin: Negative for rash. "   Psychiatric/Behavioral: Negative for agitation.       Physical Exam   Constitutional: She appears well-developed and well-nourished.   Morbidly obese   HENT:   Head: Normocephalic.   Mouth/Throat: Oropharynx is clear and moist.   Eyes: Conjunctivae are normal.   Neck: Normal range of motion. No JVD present. No thyromegaly present.   Cardiovascular: Normal rate, regular rhythm and normal heart sounds.    No murmur heard.  Pulmonary/Chest: Effort normal and breath sounds normal. No respiratory distress. She has no wheezes. She has no rales.   Abdominal: Soft. Bowel sounds are normal. She exhibits no distension. There is no tenderness. There is no guarding.   Neurological: She is alert.   Skin: Skin is warm and dry. Rash noted.   Lower abdominal folds with generalized erythema induration and tenderness large pannus   Nursing note and vitals reviewed.          Medication Review:   I have reviewed the patient's current medication list      allopurinol 300 mg Oral Daily   divalproex 500 mg Oral TID   enoxaparin 30 mg Subcutaneous Q24H   fluconazole 200 mg Intravenous Daily   levothyroxine 50 mcg Oral Q AM   meropenem      meropenem      meropenem 1 g Intravenous Q8H   methylPREDNISolone sodium succinate 60 mg Intravenous Q8H   nicotine 1 patch Transdermal Q24H   nystatin  Topical Q12H   nystatin  Topical Q12H   perphenazine 4 mg Oral BID   Pharmacy Consult - Pharmacy to dose  Does not apply Once   sodium chloride              Pharmacy to dose vancomycin     sodium chloride 150 mL/hr Last Rate: 150 mL/hr (12/07/17 1915)           Labs:    Results from last 7 days  Lab Units 12/08/17  0429 12/07/17  1351   WBC 10*3/mm3 15.64* 18.97*   HEMOGLOBIN g/dL 13.1 14.0   HEMATOCRIT % 41.3 43.6   PLATELETS 10*3/mm3 412 441       Results from last 7 days  Lab Units 12/08/17  0428 12/07/17  1351   SODIUM mmol/L 132* 132*   POTASSIUM mmol/L 4.3 3.9   CHLORIDE mmol/L 95* 86*   CO2 mmol/L 26.1 27.5   BUN mg/dL 11 13   CREATININE mg/dL  1.11* 1.69*   CALCIUM mg/dL 8.5* 9.2   BILIRUBIN mg/dL  --  0.3   ALK PHOS U/L  --  97   ALT (SGPT) U/L  --  7   AST (SGOT) U/L  --  9   GLUCOSE mg/dL 130* 125*           Lab Results (last 24 hours)     Procedure Component Value Units Date/Time    CBC & Differential [[id number]] Collected:  12/07/17 1351    Specimen:  Blood Updated:  12/07/17 1409    Narrative:       The following orders were created for panel order CBC & Differential.  Procedure                               Abnormality         Status                     ---------                               -----------         ------                     CBC Auto Differential[[id number]]        Abnormal            Final result                 Please view results for these tests on the individual orders.    CBC Auto Differential [[id number]]  (Abnormal) Collected:  12/07/17 1351    Specimen:  Blood Updated:  12/07/17 1409     WBC 18.97 (H) 10*3/mm3      RBC 5.17 10*6/mm3      Hemoglobin 14.0 g/dL      Hematocrit 43.6 %      MCV 84.3 fL      MCH 27.1 pg      MCHC 32.1 g/dL      RDW 17.5 (H) %      RDW-SD 52.3 fl      MPV 8.5 fL      Platelets 441 10*3/mm3      Neutrophil % 68.5 %      Lymphocyte % 20.5 %      Monocyte % 4.2 %      Eosinophil % 1.2 %      Basophil % 0.8 %      Immature Grans % 4.8 (H) %      Neutrophils, Absolute 13.01 (H) 10*3/mm3      Lymphocytes, Absolute 3.88 10*3/mm3      Monocytes, Absolute 0.79 10*3/mm3      Eosinophils, Absolute 0.22 10*3/mm3      Basophils, Absolute 0.15 10*3/mm3      Immature Grans, Absolute 0.92 (H) 10*3/mm3      nRBC 0.0 /100 WBC     Protime-INR [[id number]]  (Normal) Collected:  12/07/17 1351    Specimen:  Blood Updated:  12/07/17 1415     Protime 12.9 Seconds      INR 0.97    Narrative:       Therapeutic Ranges for INR: 2.0-3.0 (PT 20-30)                              2.5-3.5 (PT 25-34)    aPTT [[id number]]  (Normal) Collected:  12/07/17 1351    Specimen:  Blood Updated:  12/07/17 1415     PTT 34.9 seconds     Narrative:        PTT = The equivalent PTT values for the therapeutic range of heparin levels at 0.1 to 0.7 U/ml are 53 to 110 seconds.    Lactic Acid, Plasma [[id number]]  (Normal) Collected:  12/07/17 1351    Specimen:  Blood Updated:  12/07/17 1428     Lactate 2.0 mmol/L     Comprehensive Metabolic Panel [[id number]]  (Abnormal) Collected:  12/07/17 1351    Specimen:  Blood Updated:  12/07/17 1513     Glucose 125 (H) mg/dL      BUN 13 mg/dL      Creatinine 1.69 (H) mg/dL      Sodium 132 (L) mmol/L      Potassium 3.9 mmol/L      Chloride 86 (L) mmol/L      CO2 27.5 mmol/L      Calcium 9.2 mg/dL      Total Protein 7.1 g/dL      Albumin 3.60 g/dL      ALT (SGPT) 7 U/L      AST (SGOT) 9 U/L      Alkaline Phosphatase 97 U/L      Total Bilirubin 0.3 mg/dL      eGFR Non African Amer 32 (L) mL/min/1.73      Globulin 3.5 gm/dL      A/G Ratio 1.0 g/dL      BUN/Creatinine Ratio 7.7     Anion Gap 18.5 mmol/L     Procalcitonin [[id number]]  (Abnormal) Collected:  12/07/17 1428    Specimen:  Blood Updated:  12/07/17 1523     Procalcitonin 0.04 (L) ng/mL     Narrative:       As a Marker for Sepsis (Non-Neonates):   1. <0.5 ng/mL represents a low risk of severe sepsis and/or septic shock.  2. >2 ng/mL represents a high risk of severe sepsis and/or septic shock.    As a Marker for Lower Respiratory Tract Infections that require antibiotic therapy:    PCT on Admission     Antibiotic Therapy       6-12 Hrs later  > 0.5                Strongly Recommended             >0.25 - <0.5         Recommended  0.1 - 0.25           Discouraged              Remeasure/reassess PCT  <0.1                 Strongly Discouraged     Remeasure/reassess PCT                     PCT values of < 0.5 ng/mL do not exclude an infection, because localized infections (without systemic signs) may be associated with such low concentrations, or a systemic infection in its initial stages (< 6 hours). Furthermore, increased PCT can occur without infection. PCT concentrations between  0.5 and 2.0 ng/mL should be interpreted taking into account the patient's history. It is recommended to retest PCT within 6-24 hours if any concentrations < 2 ng/mL are obtained.    Urinalysis With / Culture If Indicated - Urine, Catheter [[id number]]  (Abnormal) Collected:  12/07/17 1637    Specimen:  Urine from Urine, Catheter Updated:  12/07/17 1649     Color, UA Dark Yellow (A)     Appearance, UA Clear     pH, UA 5.5     Specific Gravity, UA 1.022      Result obtained by Refractometer        Glucose, UA Negative     Ketones, UA Trace (A)     Bilirubin, UA Moderate (2+) (A)     Blood, UA Negative     Protein, UA Trace (A)     Leuk Esterase, UA Negative     Nitrite, UA Negative     Urobilinogen, UA 2.0 E.U./dL (A)    Narrative:       Urine microscopic not indicated.    TSH [[id number]]  (Normal) Collected:  12/07/17 1927    Specimen:  Blood Updated:  12/07/17 1952     TSH 1.280 mIU/mL     Blood Culture - Blood, [[id number]]  (Normal) Collected:  12/07/17 1351    Specimen:  Blood from Arm, Right Updated:  12/08/17 0216     Blood Culture No growth at less than 24 hours    Blood Culture - Blood, [[id number]]  (Normal) Collected:  12/07/17 1424    Specimen:  Blood from Arm, Right Updated:  12/08/17 0231     Blood Culture No growth at less than 24 hours    CBC (No Diff) [[id number]]  (Abnormal) Collected:  12/08/17 0429    Specimen:  Blood Updated:  12/08/17 0435     WBC 15.64 (H) 10*3/mm3      RBC 4.86 10*6/mm3      Hemoglobin 13.1 g/dL      Hematocrit 41.3 %      MCV 85.0 fL      MCH 27.0 pg      MCHC 31.7 g/dL      RDW 17.0 (H) %      RDW-SD 52.5 fl      MPV 8.7 fL      Platelets 412 10*3/mm3     Valproic Acid Level, Total [[id number]]  (Abnormal) Collected:  12/08/17 0428    Specimen:  Blood Updated:  12/08/17 0451     Valproic Acid 26.1 (L) mcg/mL     Narrative:       Therapeutic Ranges for Valproic Acid    Epilepsy:       mcg/ml  Bipolar/Reema  up to 125 mcg/ml    Basic Metabolic Panel [[id number]]  (Abnormal)  Collected:  12/08/17 0428    Specimen:  Blood Updated:  12/08/17 0451     Glucose 130 (H) mg/dL      BUN 11 mg/dL      Creatinine 1.11 (H) mg/dL      Sodium 132 (L) mmol/L      Potassium 4.3 mmol/L      Chloride 95 (L) mmol/L      CO2 26.1 mmol/L      Calcium 8.5 (L) mg/dL      eGFR Non African Amer 52 (L) mL/min/1.73      BUN/Creatinine Ratio 9.9     Anion Gap 10.9 mmol/L     Narrative:       GFR Normal >60  Chronic Kidney Disease <60  Kidney Failure <15              Radiology:  Imaging Results (last 24 hours)     Procedure Component Value Units Date/Time    XR Chest 2 View [[id number]] Collected:  12/07/17 1223     Updated:  12/07/17 1226    Narrative:       CHEST X-RAY, 12/07/2017:     HISTORY:   47-year-old female in the ED complaining of one history of shortness of  air and cough.     TECHNIQUE:  PA and lateral upright chest series.     FINDINGS:  The lungs are expanded and clear. Heart size and pulmonary vascularity  are within normal limits. No visible pulmonary infiltrate or pleural  effusion. Mild thoracolumbar spinal curvature.       Impression:       No active disease.     This report was finalized on 12/7/2017 12:24 PM by Dr. Wolf Flanagan MD.       CT Abdomen Pelvis Without Contrast [[id number]] Collected:  12/07/17 1324     Updated:  12/07/17 1331    Narrative:       CT ABDOMEN/PELVIS, NONCONTRAST, 12/07/2017:     HISTORY:  47-year-old female in the ED complaining of four day history of lower  abdomen pain.     TECHNIQUE:  CT examination of the abdomen and pelvis without oral or IV contrast  using kidney stone protocol, as requested. Radiation dose reduction  techniques were utilized, including automated exposure control and  exposure modulation based on body size.     COMPARISON:  *  CT abdomen/pelvis, 07/20/2017.     ABDOMEN FINDINGS:  Both kidneys, both ureters and the urinary bladder are normal in  noncontrast CT appearance. No visible nephrolithiasis or evidence of  urinary obstruction.     No  gallbladder distention or bile duct dilatation. Liver, pancreas and  spleen are normal in size and appearance without contrast.     Small bowel and colon are normal in caliber and appearance, as imaged.  Normal appendix.     Skin thickening in the inferior portion of the large abdominal pannus  with some subcutaneous soft tissue stranding on the right may represent  skin inflammation/cellulitis. No abscess or other fluid collection is  demonstrated.     PELVIS FINDINGS:  Uterus, ovaries, urinary bladder and rectum are within normal limits.     Limited lung base images show linear atelectasis or scarring in the left  posterior costophrenic angle.       Impression:       1. No acute abnormality within the abdomen or pelvis.  2. Possible skin inflammation in the right lower quadrant abdominal  wall. No visible abscess.     This report was finalized on 12/7/2017 1:29 PM by Dr. Wolf Flanagan MD.             Cardiology:  ECG/EMG Results (last 24 hours)     ** No results found for the last 24 hours. **          I have reviewed consult notes.    Assessment and Plan:    1.Sepsis improved continue IV antibiotics    2.  Abdominal cellulitis likely both fungal and bacterial continue broad-spectrum IV antibiotics until cultures are available    3.  Acute kidney injury is improved continue supportive care    4.  Diabetes mellitus blood sugars relatively euglycemic check A1c Accu-Cheks    5.  COPD is stable    6.  Obstructive sleep apnea continue home CPAP    7.  Bipolar disorder nothing acute    8.  Hypothyroidism stable

## 2017-12-09 LAB
ANION GAP SERPL CALCULATED.3IONS-SCNC: 10.3 MMOL/L
BUN BLD-MCNC: 13 MG/DL (ref 6–20)
BUN/CREAT SERPL: 16.7 (ref 7–25)
CALCIUM SPEC-SCNC: 9 MG/DL (ref 8.6–10.5)
CHLORIDE SERPL-SCNC: 99 MMOL/L (ref 98–107)
CO2 SERPL-SCNC: 26.7 MMOL/L (ref 22–29)
CREAT BLD-MCNC: 0.78 MG/DL (ref 0.57–1)
DEPRECATED RDW RBC AUTO: 52.7 FL (ref 37–54)
ERYTHROCYTE [DISTWIDTH] IN BLOOD BY AUTOMATED COUNT: 16.9 % (ref 11.5–14.5)
GFR SERPL CREATININE-BSD FRML MDRD: 78 ML/MIN/1.73
GLUCOSE BLD-MCNC: 141 MG/DL (ref 65–99)
GLUCOSE BLDC GLUCOMTR-MCNC: 138 MG/DL (ref 70–130)
GLUCOSE BLDC GLUCOMTR-MCNC: 139 MG/DL (ref 70–130)
GLUCOSE BLDC GLUCOMTR-MCNC: 157 MG/DL (ref 70–130)
GLUCOSE BLDC GLUCOMTR-MCNC: 180 MG/DL (ref 70–130)
HCT VFR BLD AUTO: 38.5 % (ref 37–47)
HGB BLD-MCNC: 12 G/DL (ref 12–16)
MCH RBC QN AUTO: 26.7 PG (ref 27–31)
MCHC RBC AUTO-ENTMCNC: 31.2 G/DL (ref 31–37)
MCV RBC AUTO: 85.6 FL (ref 81–99)
PLATELET # BLD AUTO: 401 10*3/MM3 (ref 140–500)
PMV BLD AUTO: 8.9 FL (ref 7.4–10.4)
POTASSIUM BLD-SCNC: 4.8 MMOL/L (ref 3.5–5.2)
RBC # BLD AUTO: 4.5 10*6/MM3 (ref 4.2–5.4)
SODIUM BLD-SCNC: 136 MMOL/L (ref 136–145)
VANCOMYCIN SERPL-MCNC: 18 MCG/ML (ref 5–40)
WBC NRBC COR # BLD: 20.88 10*3/MM3 (ref 4.8–10.8)

## 2017-12-09 PROCEDURE — 80048 BASIC METABOLIC PNL TOTAL CA: CPT | Performed by: FAMILY MEDICINE

## 2017-12-09 PROCEDURE — 25010000002 VANCOMYCIN 1500 MG/250ML SOLUTION: Performed by: FAMILY MEDICINE

## 2017-12-09 PROCEDURE — 25010000002 ENOXAPARIN PER 10 MG: Performed by: FAMILY MEDICINE

## 2017-12-09 PROCEDURE — 25010000002 METHYLPREDNISOLONE PER 125 MG: Performed by: FAMILY MEDICINE

## 2017-12-09 PROCEDURE — 82962 GLUCOSE BLOOD TEST: CPT

## 2017-12-09 PROCEDURE — 25010000002 MEROPENEM PER 100 MG: Performed by: FAMILY MEDICINE

## 2017-12-09 PROCEDURE — 94799 UNLISTED PULMONARY SVC/PX: CPT

## 2017-12-09 PROCEDURE — 63710000001 INSULIN ASPART PER 5 UNITS: Performed by: FAMILY MEDICINE

## 2017-12-09 PROCEDURE — 25010000002 FLUCONAZOLE PER 200 MG: Performed by: FAMILY MEDICINE

## 2017-12-09 PROCEDURE — 85027 COMPLETE CBC AUTOMATED: CPT | Performed by: FAMILY MEDICINE

## 2017-12-09 PROCEDURE — 80202 ASSAY OF VANCOMYCIN: CPT | Performed by: FAMILY MEDICINE

## 2017-12-09 RX ORDER — METHYLPREDNISOLONE SODIUM SUCCINATE 40 MG/ML
40 INJECTION, POWDER, LYOPHILIZED, FOR SOLUTION INTRAMUSCULAR; INTRAVENOUS EVERY 8 HOURS
Status: DISCONTINUED | OUTPATIENT
Start: 2017-12-09 | End: 2017-12-10

## 2017-12-09 RX ORDER — FUROSEMIDE 20 MG/1
20 TABLET ORAL 2 TIMES DAILY
Status: DISCONTINUED | OUTPATIENT
Start: 2017-12-09 | End: 2017-12-11 | Stop reason: HOSPADM

## 2017-12-09 RX ORDER — SPIRONOLACTONE 25 MG/1
25 TABLET ORAL DAILY
Status: DISCONTINUED | OUTPATIENT
Start: 2017-12-09 | End: 2017-12-11 | Stop reason: HOSPADM

## 2017-12-09 RX ORDER — OLMESARTAN MEDOXOMIL 20 MG/1
20 TABLET ORAL
Status: DISCONTINUED | OUTPATIENT
Start: 2017-12-09 | End: 2017-12-11 | Stop reason: HOSPADM

## 2017-12-09 RX ORDER — FLUCONAZOLE 100 MG/1
200 TABLET ORAL EVERY 24 HOURS
Status: DISCONTINUED | OUTPATIENT
Start: 2017-12-09 | End: 2017-12-11 | Stop reason: HOSPADM

## 2017-12-09 RX ADMIN — MEROPENEM 1 G: 1 INJECTION, POWDER, FOR SOLUTION INTRAVENOUS at 11:31

## 2017-12-09 RX ADMIN — NICOTINE 1 PATCH: 21 PATCH TRANSDERMAL at 20:06

## 2017-12-09 RX ADMIN — DIVALPROEX SODIUM 500 MG: 500 TABLET, DELAYED RELEASE ORAL at 15:02

## 2017-12-09 RX ADMIN — METHYLPREDNISOLONE SODIUM SUCCINATE 40 MG: 125 INJECTION, POWDER, FOR SOLUTION INTRAMUSCULAR; INTRAVENOUS at 20:05

## 2017-12-09 RX ADMIN — NYSTATIN: 100000 OINTMENT TOPICAL at 20:06

## 2017-12-09 RX ADMIN — SPIRONOLACTONE 25 MG: 25 TABLET ORAL at 15:02

## 2017-12-09 RX ADMIN — DIVALPROEX SODIUM 500 MG: 500 TABLET, DELAYED RELEASE ORAL at 08:20

## 2017-12-09 RX ADMIN — OLMESARTAN MEDOXOMIL 20 MG: 20 TABLET, FILM COATED ORAL at 15:02

## 2017-12-09 RX ADMIN — VANCOMYCIN HCL-SODIUM CHLORIDE IV SOLN 1.5 GM/250ML-0.9% 1500 MG: 1.5-0.9/25 SOLUTION at 09:53

## 2017-12-09 RX ADMIN — INSULIN ASPART 2 UNITS: 100 INJECTION, SOLUTION INTRAVENOUS; SUBCUTANEOUS at 11:24

## 2017-12-09 RX ADMIN — PERPHENAZINE 4 MG: 4 TABLET, FILM COATED ORAL at 17:40

## 2017-12-09 RX ADMIN — ACETAMINOPHEN 650 MG: 325 TABLET, FILM COATED ORAL at 04:49

## 2017-12-09 RX ADMIN — ALLOPURINOL 300 MG: 300 TABLET ORAL at 08:20

## 2017-12-09 RX ADMIN — METHYLPREDNISOLONE SODIUM SUCCINATE 60 MG: 125 INJECTION, POWDER, FOR SOLUTION INTRAMUSCULAR; INTRAVENOUS at 11:24

## 2017-12-09 RX ADMIN — IPRATROPIUM BROMIDE AND ALBUTEROL SULFATE 3 ML: .5; 3 SOLUTION RESPIRATORY (INHALATION) at 15:33

## 2017-12-09 RX ADMIN — LEVOTHYROXINE SODIUM 50 MCG: 50 TABLET ORAL at 05:34

## 2017-12-09 RX ADMIN — MEROPENEM 1 G: 1 INJECTION, POWDER, FOR SOLUTION INTRAVENOUS at 03:48

## 2017-12-09 RX ADMIN — VANCOMYCIN HCL-SODIUM CHLORIDE IV SOLN 1.5 GM/250ML-0.9% 1500 MG: 1.5-0.9/25 SOLUTION at 22:48

## 2017-12-09 RX ADMIN — IPRATROPIUM BROMIDE AND ALBUTEROL SULFATE 3 ML: .5; 3 SOLUTION RESPIRATORY (INHALATION) at 11:58

## 2017-12-09 RX ADMIN — TRAMADOL HYDROCHLORIDE 50 MG: 50 TABLET, FILM COATED ORAL at 04:48

## 2017-12-09 RX ADMIN — SODIUM CHLORIDE 125 ML/HR: 9 INJECTION, SOLUTION INTRAVENOUS at 07:35

## 2017-12-09 RX ADMIN — METHYLPREDNISOLONE SODIUM SUCCINATE 60 MG: 125 INJECTION, POWDER, FOR SOLUTION INTRAMUSCULAR; INTRAVENOUS at 03:48

## 2017-12-09 RX ADMIN — FLUCONAZOLE 200 MG: 2 INJECTION, SOLUTION INTRAVENOUS at 08:20

## 2017-12-09 RX ADMIN — FUROSEMIDE 20 MG: 20 TABLET ORAL at 17:40

## 2017-12-09 RX ADMIN — PERPHENAZINE 4 MG: 4 TABLET, FILM COATED ORAL at 08:20

## 2017-12-09 RX ADMIN — DIVALPROEX SODIUM 500 MG: 500 TABLET, DELAYED RELEASE ORAL at 20:05

## 2017-12-09 RX ADMIN — IPRATROPIUM BROMIDE AND ALBUTEROL SULFATE 3 ML: .5; 3 SOLUTION RESPIRATORY (INHALATION) at 08:02

## 2017-12-09 RX ADMIN — NYSTATIN: 100000 OINTMENT TOPICAL at 08:20

## 2017-12-09 RX ADMIN — NYSTATIN: 100000 POWDER TOPICAL at 20:06

## 2017-12-09 RX ADMIN — ENOXAPARIN SODIUM 40 MG: 40 INJECTION SUBCUTANEOUS at 15:04

## 2017-12-09 RX ADMIN — TRAMADOL HYDROCHLORIDE 50 MG: 50 TABLET, FILM COATED ORAL at 11:24

## 2017-12-09 RX ADMIN — IPRATROPIUM BROMIDE AND ALBUTEROL SULFATE 3 ML: .5; 3 SOLUTION RESPIRATORY (INHALATION) at 19:32

## 2017-12-09 RX ADMIN — TRAMADOL HYDROCHLORIDE 50 MG: 50 TABLET, FILM COATED ORAL at 20:41

## 2017-12-09 RX ADMIN — NYSTATIN: 100000 POWDER TOPICAL at 08:20

## 2017-12-09 RX ADMIN — INSULIN ASPART 2 UNITS: 100 INJECTION, SOLUTION INTRAVENOUS; SUBCUTANEOUS at 08:20

## 2017-12-09 RX ADMIN — MEROPENEM 1 G: 1 INJECTION, POWDER, FOR SOLUTION INTRAVENOUS at 20:41

## 2017-12-09 NOTE — PROGRESS NOTES
"Daily Progress Note:    Subjective: Feels better.  Without complaints     Flowsheet Rows         First Filed Value    Admission Height  162.6 cm (64\") Documented at 12/07/2017 1142    Admission Weight  127 kg (279 lb) Documented at 12/07/2017 1142          Patient Vitals for the past 24 hrs:   BP Temp Temp src Pulse Resp SpO2 Weight   12/09/17 1143 133/83 98.2 °F (36.8 °C) Oral 86 20 (!) 88 % -   12/09/17 0802 - - - 81 18 (!) 89 % -   12/09/17 0644 139/84 98 °F (36.7 °C) Oral 89 16 95 % (!) 154 kg (339 lb 8 oz)   12/09/17 0343 131/74 98.6 °F (37 °C) Oral 69 16 93 % -   12/09/17 0018 121/77 97.3 °F (36.3 °C) Oral 84 18 95 % -   12/08/17 1958 - - - 97 20 94 % -   12/08/17 1901 127/73 97.9 °F (36.6 °C) Oral 112 20 95 % -   12/08/17 1627 130/89 99.8 °F (37.7 °C) Oral 88 20 95 % -   12/08/17 1537 - - - 80 20 94 % -       (!) 154 kg (339 lb 8 oz)      Intake/Output Summary (Last 24 hours) at 12/09/17 1318  Last data filed at 12/09/17 1100   Gross per 24 hour   Intake             1550 ml   Output              700 ml   Net              850 ml       Review of Systems   Constitutional: Negative for appetite change and fatigue.   Respiratory: Negative for cough, chest tightness, shortness of breath and wheezing.    Cardiovascular: Negative for chest pain.   Gastrointestinal: Negative for abdominal distention, diarrhea, nausea and vomiting.   Genitourinary: Negative for frequency.   Skin: Negative for rash.   Psychiatric/Behavioral: Negative for agitation.       Physical Exam   Constitutional: She appears well-developed and well-nourished.   Morbidly obese   HENT:   Head: Normocephalic.   Mouth/Throat: Oropharynx is clear and moist.   Eyes: Conjunctivae are normal.   Neck: Normal range of motion. No JVD present. No thyromegaly present.   Cardiovascular: Normal rate, regular rhythm and normal heart sounds.    No murmur heard.  Pulmonary/Chest: Effort normal and breath sounds normal. No respiratory distress. She has no wheezes. " She has no rales.   Abdominal: Soft. Bowel sounds are normal. She exhibits no distension. There is no tenderness. There is no guarding.   Neurological: She is alert.   Skin: Skin is warm and dry. Rash noted.   Lower abdominal folds mild redness much improved   Nursing note and vitals reviewed.          Medication Review:   I have reviewed the patient's current medication list      allopurinol 300 mg Oral Daily   divalproex 500 mg Oral TID   enoxaparin 30 mg Subcutaneous Q24H   fluconazole 200 mg Intravenous Daily   insulin aspart 0-7 Units Subcutaneous 4x Daily AC & at Bedtime   ipratropium-albuterol 3 mL Nebulization 4x Daily - RT   levothyroxine 50 mcg Oral Q AM   meropenem 1 g Intravenous Q8H   methylPREDNISolone sodium succinate 60 mg Intravenous Q8H   nicotine 1 patch Transdermal Q24H   nystatin  Topical Q12H   nystatin  Topical Q12H   perphenazine 4 mg Oral BID   Pharmacy Consult - Pharmacy to dose  Does not apply Once   vancomycin 1,500 mg Intravenous Q12H           Pharmacy to dose vancomycin            Labs:    Results from last 7 days  Lab Units 12/09/17  0336 12/08/17  0429 12/07/17  1351   WBC 10*3/mm3 20.88* 15.64* 18.97*   HEMOGLOBIN g/dL 12.0 13.1 14.0   HEMATOCRIT % 38.5 41.3 43.6   PLATELETS 10*3/mm3 401 412 441       Results from last 7 days  Lab Units 12/09/17  0336 12/08/17  0428 12/07/17  1351   SODIUM mmol/L 136 132* 132*   POTASSIUM mmol/L 4.8 4.3 3.9   CHLORIDE mmol/L 99 95* 86*   CO2 mmol/L 26.7 26.1 27.5   BUN mg/dL 13 11 13   CREATININE mg/dL 0.78 1.11* 1.69*   CALCIUM mg/dL 9.0 8.5* 9.2   BILIRUBIN mg/dL  --   --  0.3   ALK PHOS U/L  --   --  97   ALT (SGPT) U/L  --   --  7   AST (SGOT) U/L  --   --  9   GLUCOSE mg/dL 141* 130* 125*           Lab Results (last 24 hours)     Procedure Component Value Units Date/Time    Blood Culture - Blood, [[id number]]  (Normal) Collected:  12/07/17 1351    Specimen:  Blood from Arm, Right Updated:  12/08/17 1416     Blood Culture No growth at 24 hours     Blood Culture - Blood, [[id number]]  (Normal) Collected:  12/07/17 1424    Specimen:  Blood from Arm, Right Updated:  12/08/17 1432     Blood Culture No growth at 24 hours    POC Glucose Fingerstick [[id number]]  (Abnormal) Collected:  12/08/17 1527    Specimen:  Blood Updated:  12/08/17 1533     Glucose 164 (H) mg/dL     Narrative:       Meter: [id number] : [id number] [employer]    POC Glucose Fingerstick [[id number]]  (Abnormal) Collected:  12/08/17 1700    Specimen:  Blood Updated:  12/08/17 1708     Glucose 147 (H) mg/dL     Narrative:       Meter: [id number] : [id number] [employer] C[employer] Float    POC Glucose Fingerstick [[id number]]  (Abnormal) Collected:  12/08/17 2037    Specimen:  Blood Updated:  12/08/17 2044     Glucose 154 (H) mg/dL     Narrative:       Meter: [id number] : [id number] Chase Knutson    CBC (No Diff) [[id number]]  (Abnormal) Collected:  12/09/17 0336    Specimen:  Blood Updated:  12/09/17 0430     WBC 20.88 (H) 10*3/mm3      RBC 4.50 10*6/mm3      Hemoglobin 12.0 g/dL      Hematocrit 38.5 %      MCV 85.6 fL      MCH 26.7 (L) pg      MCHC 31.2 g/dL      RDW 16.9 (H) %      RDW-SD 52.7 fl      MPV 8.9 fL      Platelets 401 10*3/mm3     Basic Metabolic Panel [[id number]]  (Abnormal) Collected:  12/09/17 0336    Specimen:  Blood Updated:  12/09/17 0505     Glucose 141 (H) mg/dL      BUN 13 mg/dL      Creatinine 0.78 mg/dL      Sodium 136 mmol/L      Potassium 4.8 mmol/L      Chloride 99 mmol/L      CO2 26.7 mmol/L      Calcium 9.0 mg/dL      eGFR Non African Amer 78 mL/min/1.73      BUN/Creatinine Ratio 16.7     Anion Gap 10.3 mmol/L     Narrative:       GFR Normal >60  Chronic Kidney Disease <60  Kidney Failure <15    POC Glucose Fingerstick [[id number]]  (Abnormal) Collected:  12/09/17 0730    Specimen:  Blood Updated:  12/09/17 0737     Glucose 157 (H) mg/dL     Narrative:       Meter: [id number] : [id number] Caio Frias Nursing Assistant    Vancomycin, Random  [[id number]]  (Normal) Collected:  12/09/17 0800    Specimen:  Blood Updated:  12/09/17 0827     Vancomycin Random 18.00 mcg/mL     POC Glucose Fingerstick [[id number]]  (Abnormal) Collected:  12/09/17 1115    Specimen:  Blood Updated:  12/09/17 1121     Glucose 180 (H) mg/dL     Narrative:       Meter: [id number] : [id number] Caio Frias Nursing Assistant              Radiology:  Imaging Results (last 24 hours)     ** No results found for the last 24 hours. **          Cardiology:  ECG/EMG Results (last 24 hours)     ** No results found for the last 24 hours. **          I have reviewed consult notes.    Assessment and Plan:    1.Sepsis Resolved    2.  Abdominal cellulitis likely both fungal and bacterial continue broad-spectrum IV antibiotics culture showing normal urogenital vladimir    3.  Acute kidney injury has resolved we'll DC IV fluids    4.  Diabetes mellitus Some hypoglycemia secondary to IV steroids continue Accu-Cheks slight scale insulin    5.  COPD is stable    6.  Obstructive sleep apnea continue home CPAP    7.  Bipolar disorder nothing acute    8.  Hypothyroidism stable    9.  Chronic hypoxic respiratory failure to home O2    10.  Leukocytosis likely due to steroids will monitor

## 2017-12-09 NOTE — PLAN OF CARE
Problem: Cellulitis (Adult)  Goal: Signs and Symptoms of Listed Potential Problems Will be Absent or Manageable (Cellulitis)  Outcome: Ongoing (interventions implemented as appropriate)    Problem: Skin Integrity Impairment, Risk/Actual (Adult)  Goal: Identify Related Risk Factors and Signs and Symptoms  Outcome: Ongoing (interventions implemented as appropriate)  Goal: Skin Integrity/Wound Healing  Outcome: Ongoing (interventions implemented as appropriate)    Problem: Fall Risk (Adult)  Goal: Identify Related Risk Factors and Signs and Symptoms  Outcome: Ongoing (interventions implemented as appropriate)  Goal: Absence of Falls  Outcome: Ongoing (interventions implemented as appropriate)    Problem: Patient Care Overview (Adult)  Goal: Plan of Care Review  Outcome: Ongoing (interventions implemented as appropriate)

## 2017-12-09 NOTE — PLAN OF CARE
Problem: Skin Integrity Impairment, Risk/Actual (Adult)  Goal: Skin Integrity/Wound Healing  Outcome: Ongoing (interventions implemented as appropriate)    Problem: Patient Care Overview (Adult)  Goal: Plan of Care Review  Outcome: Ongoing (interventions implemented as appropriate)    12/08/17 2001   Coping/Psychosocial Response Interventions   Plan Of Care Reviewed With patient   Patient Care Overview   Progress improving   Outcome Evaluation   Outcome Summary/Follow up Plan ambulated in room, was utc for meals. Urinating, wearing o2. Discussed at lenght importance of quitting smoking.

## 2017-12-10 LAB
ANION GAP SERPL CALCULATED.3IONS-SCNC: 13.7 MMOL/L
BUN BLD-MCNC: 17 MG/DL (ref 6–20)
BUN/CREAT SERPL: 22.4 (ref 7–25)
CALCIUM SPEC-SCNC: 9.2 MG/DL (ref 8.6–10.5)
CHLORIDE SERPL-SCNC: 99 MMOL/L (ref 98–107)
CO2 SERPL-SCNC: 26.3 MMOL/L (ref 22–29)
CREAT BLD-MCNC: 0.76 MG/DL (ref 0.57–1)
DEPRECATED RDW RBC AUTO: 53.2 FL (ref 37–54)
ERYTHROCYTE [DISTWIDTH] IN BLOOD BY AUTOMATED COUNT: 17.2 % (ref 11.5–14.5)
GFR SERPL CREATININE-BSD FRML MDRD: 81 ML/MIN/1.73
GLUCOSE BLD-MCNC: 131 MG/DL (ref 65–99)
GLUCOSE BLDC GLUCOMTR-MCNC: 132 MG/DL (ref 70–130)
GLUCOSE BLDC GLUCOMTR-MCNC: 135 MG/DL (ref 70–130)
GLUCOSE BLDC GLUCOMTR-MCNC: 147 MG/DL (ref 70–130)
GLUCOSE BLDC GLUCOMTR-MCNC: 148 MG/DL (ref 70–130)
HCT VFR BLD AUTO: 39 % (ref 37–47)
HGB BLD-MCNC: 12.4 G/DL (ref 12–16)
MCH RBC QN AUTO: 27.1 PG (ref 27–31)
MCHC RBC AUTO-ENTMCNC: 31.8 G/DL (ref 31–37)
MCV RBC AUTO: 85.3 FL (ref 81–99)
PLATELET # BLD AUTO: 441 10*3/MM3 (ref 140–500)
PMV BLD AUTO: 9 FL (ref 7.4–10.4)
POTASSIUM BLD-SCNC: 4.5 MMOL/L (ref 3.5–5.2)
RBC # BLD AUTO: 4.57 10*6/MM3 (ref 4.2–5.4)
SODIUM BLD-SCNC: 139 MMOL/L (ref 136–145)
WBC NRBC COR # BLD: 23.11 10*3/MM3 (ref 4.8–10.8)

## 2017-12-10 PROCEDURE — 94799 UNLISTED PULMONARY SVC/PX: CPT

## 2017-12-10 PROCEDURE — 82962 GLUCOSE BLOOD TEST: CPT

## 2017-12-10 PROCEDURE — 63710000001 PREDNISONE PER 5 MG: Performed by: FAMILY MEDICINE

## 2017-12-10 PROCEDURE — 25010000002 METHYLPREDNISOLONE PER 125 MG: Performed by: FAMILY MEDICINE

## 2017-12-10 PROCEDURE — 80048 BASIC METABOLIC PNL TOTAL CA: CPT | Performed by: FAMILY MEDICINE

## 2017-12-10 PROCEDURE — 25010000002 MEROPENEM PER 100 MG: Performed by: FAMILY MEDICINE

## 2017-12-10 PROCEDURE — 25010000002 ENOXAPARIN PER 10 MG: Performed by: FAMILY MEDICINE

## 2017-12-10 PROCEDURE — 85027 COMPLETE CBC AUTOMATED: CPT | Performed by: FAMILY MEDICINE

## 2017-12-10 RX ORDER — PREDNISONE 10 MG/1
10 TABLET ORAL
Status: DISCONTINUED | OUTPATIENT
Start: 2017-12-10 | End: 2017-12-11 | Stop reason: HOSPADM

## 2017-12-10 RX ORDER — SULFAMETHOXAZOLE AND TRIMETHOPRIM 800; 160 MG/1; MG/1
1 TABLET ORAL EVERY 12 HOURS SCHEDULED
Status: DISCONTINUED | OUTPATIENT
Start: 2017-12-10 | End: 2017-12-11 | Stop reason: HOSPADM

## 2017-12-10 RX ADMIN — PERPHENAZINE 4 MG: 4 TABLET, FILM COATED ORAL at 08:10

## 2017-12-10 RX ADMIN — IPRATROPIUM BROMIDE AND ALBUTEROL SULFATE 3 ML: .5; 3 SOLUTION RESPIRATORY (INHALATION) at 11:32

## 2017-12-10 RX ADMIN — IPRATROPIUM BROMIDE AND ALBUTEROL SULFATE 3 ML: .5; 3 SOLUTION RESPIRATORY (INHALATION) at 07:40

## 2017-12-10 RX ADMIN — METHYLPREDNISOLONE SODIUM SUCCINATE 40 MG: 125 INJECTION, POWDER, FOR SOLUTION INTRAMUSCULAR; INTRAVENOUS at 03:49

## 2017-12-10 RX ADMIN — DIVALPROEX SODIUM 500 MG: 500 TABLET, DELAYED RELEASE ORAL at 15:45

## 2017-12-10 RX ADMIN — TRAMADOL HYDROCHLORIDE 50 MG: 50 TABLET, FILM COATED ORAL at 15:55

## 2017-12-10 RX ADMIN — TRAMADOL HYDROCHLORIDE 50 MG: 50 TABLET, FILM COATED ORAL at 05:18

## 2017-12-10 RX ADMIN — PREDNISONE 10 MG: 10 TABLET ORAL at 17:22

## 2017-12-10 RX ADMIN — NYSTATIN: 100000 OINTMENT TOPICAL at 08:11

## 2017-12-10 RX ADMIN — ENOXAPARIN SODIUM 40 MG: 40 INJECTION SUBCUTANEOUS at 03:41

## 2017-12-10 RX ADMIN — NYSTATIN: 100000 OINTMENT TOPICAL at 21:49

## 2017-12-10 RX ADMIN — IPRATROPIUM BROMIDE AND ALBUTEROL SULFATE 3 ML: .5; 3 SOLUTION RESPIRATORY (INHALATION) at 20:47

## 2017-12-10 RX ADMIN — PREDNISONE 10 MG: 10 TABLET ORAL at 12:17

## 2017-12-10 RX ADMIN — TRAMADOL HYDROCHLORIDE 50 MG: 50 TABLET, FILM COATED ORAL at 10:23

## 2017-12-10 RX ADMIN — ENOXAPARIN SODIUM 40 MG: 40 INJECTION SUBCUTANEOUS at 15:55

## 2017-12-10 RX ADMIN — SULFAMETHOXAZOLE AND TRIMETHOPRIM 160 MG: 800; 160 TABLET ORAL at 10:24

## 2017-12-10 RX ADMIN — NYSTATIN: 100000 POWDER TOPICAL at 08:11

## 2017-12-10 RX ADMIN — OLMESARTAN MEDOXOMIL 20 MG: 20 TABLET, FILM COATED ORAL at 08:29

## 2017-12-10 RX ADMIN — FUROSEMIDE 20 MG: 20 TABLET ORAL at 17:23

## 2017-12-10 RX ADMIN — NYSTATIN: 100000 POWDER TOPICAL at 21:49

## 2017-12-10 RX ADMIN — MEROPENEM 1 G: 1 INJECTION, POWDER, FOR SOLUTION INTRAVENOUS at 03:41

## 2017-12-10 RX ADMIN — SPIRONOLACTONE 25 MG: 25 TABLET ORAL at 10:24

## 2017-12-10 RX ADMIN — TRAMADOL HYDROCHLORIDE 50 MG: 50 TABLET, FILM COATED ORAL at 21:53

## 2017-12-10 RX ADMIN — DIVALPROEX SODIUM 500 MG: 500 TABLET, DELAYED RELEASE ORAL at 21:50

## 2017-12-10 RX ADMIN — FUROSEMIDE 20 MG: 20 TABLET ORAL at 08:30

## 2017-12-10 RX ADMIN — DIVALPROEX SODIUM 500 MG: 500 TABLET, DELAYED RELEASE ORAL at 08:09

## 2017-12-10 RX ADMIN — LEVOTHYROXINE SODIUM 50 MCG: 50 TABLET ORAL at 05:19

## 2017-12-10 RX ADMIN — SULFAMETHOXAZOLE AND TRIMETHOPRIM 160 MG: 800; 160 TABLET ORAL at 21:50

## 2017-12-10 RX ADMIN — PERPHENAZINE 4 MG: 4 TABLET, FILM COATED ORAL at 17:23

## 2017-12-10 RX ADMIN — ALLOPURINOL 300 MG: 300 TABLET ORAL at 08:10

## 2017-12-10 RX ADMIN — NICOTINE 1 PATCH: 21 PATCH TRANSDERMAL at 17:24

## 2017-12-10 RX ADMIN — IPRATROPIUM BROMIDE AND ALBUTEROL SULFATE 3 ML: .5; 3 SOLUTION RESPIRATORY (INHALATION) at 16:08

## 2017-12-10 RX ADMIN — FLUCONAZOLE 200 MG: 100 TABLET ORAL at 15:46

## 2017-12-10 NOTE — PROGRESS NOTES
"Daily Progress Note:    Subjective: Feels better.  Without complaints     Flowsheet Rows         First Filed Value    Admission Height  162.6 cm (64\") Documented at 12/07/2017 1142    Admission Weight  127 kg (279 lb) Documented at 12/07/2017 1142          Patient Vitals for the past 24 hrs:   BP Temp Temp src Pulse Resp SpO2 Weight   12/10/17 1100 144/94 97.6 °F (36.4 °C) Oral 74 21 91 % -   12/10/17 0920 - - - - - - (!) 151 kg (333 lb 12.8 oz)   12/10/17 0619 149/84 97.7 °F (36.5 °C) Oral 94 20 91 % -   12/10/17 0015 137/80 97.5 °F (36.4 °C) Oral 79 20 93 % -   12/09/17 2000 134/82 97.3 °F (36.3 °C) Oral 87 20 91 % -   12/09/17 1932 - - - 86 20 (!) 89 % -   12/09/17 1538 - - - - - 91 % -   12/09/17 1533 - - - 86 20 91 % -   12/09/17 1510 136/87 97.3 °F (36.3 °C) Oral 87 18 91 % -       (!) 151 kg (333 lb 12.8 oz)      Intake/Output Summary (Last 24 hours) at 12/10/17 1218  Last data filed at 12/10/17 1100   Gross per 24 hour   Intake             1680 ml   Output             1000 ml   Net              680 ml       Review of Systems   Constitutional: Negative for appetite change and fatigue.   Respiratory: Negative for cough, chest tightness, shortness of breath and wheezing.    Cardiovascular: Negative for chest pain.   Gastrointestinal: Negative for abdominal distention, diarrhea, nausea and vomiting.   Genitourinary: Negative for frequency.   Skin: Negative for rash.   Psychiatric/Behavioral: Negative for agitation.       Physical Exam   Constitutional: She appears well-developed and well-nourished.   Morbidly obese   HENT:   Head: Normocephalic.   Mouth/Throat: Oropharynx is clear and moist.   Eyes: Conjunctivae are normal.   Neck: Normal range of motion. No JVD present. No thyromegaly present.   Cardiovascular: Normal rate, regular rhythm and normal heart sounds.    No murmur heard.  Pulmonary/Chest: Effort normal and breath sounds normal. No respiratory distress. She has no wheezes. She has no rales. "   Abdominal: Soft. Bowel sounds are normal. She exhibits no distension. There is no tenderness. There is no guarding.   Neurological: She is alert.   Skin: Skin is warm and dry. Rash noted.   Lower abdominal folds mild redness much improved   Nursing note and vitals reviewed.          Medication Review:   I have reviewed the patient's current medication list      allopurinol 300 mg Oral Daily   divalproex 500 mg Oral TID   enoxaparin 40 mg Subcutaneous Q12H   fluconazole 200 mg Oral Q24H   furosemide 20 mg Oral BID   insulin aspart 0-7 Units Subcutaneous 4x Daily AC & at Bedtime   ipratropium-albuterol 3 mL Nebulization 4x Daily - RT   levothyroxine 50 mcg Oral Q AM   nicotine 1 patch Transdermal Q24H   nystatin  Topical Q12H   nystatin  Topical Q12H   olmesartan 20 mg Oral Q24H   perphenazine 4 mg Oral BID   Pharmacy Consult - Pharmacy to dose  Does not apply Once   predniSONE 10 mg Oral TID With Meals   spironolactone 25 mg Oral Daily   sulfamethoxazole-trimethoprim 1 tablet Oral Q12H           Pharmacy to dose vancomycin            Labs:    Results from last 7 days  Lab Units 12/10/17  0417 12/09/17  0336 12/08/17  0429 12/07/17  1351   WBC 10*3/mm3 23.11* 20.88* 15.64* 18.97*   HEMOGLOBIN g/dL 12.4 12.0 13.1 14.0   HEMATOCRIT % 39.0 38.5 41.3 43.6   PLATELETS 10*3/mm3 441 401 412 441       Results from last 7 days  Lab Units 12/10/17  0417 12/09/17  0336 12/08/17  0428 12/07/17  1351   SODIUM mmol/L 139 136 132* 132*   POTASSIUM mmol/L 4.5 4.8 4.3 3.9   CHLORIDE mmol/L 99 99 95* 86*   CO2 mmol/L 26.3 26.7 26.1 27.5   BUN mg/dL 17 13 11 13   CREATININE mg/dL 0.76 0.78 1.11* 1.69*   CALCIUM mg/dL 9.2 9.0 8.5* 9.2   BILIRUBIN mg/dL  --   --   --  0.3   ALK PHOS U/L  --   --   --  97   ALT (SGPT) U/L  --   --   --  7   AST (SGOT) U/L  --   --   --  9   GLUCOSE mg/dL 131* 141* 130* 125*           Lab Results (last 24 hours)     Procedure Component Value Units Date/Time    Blood Culture - Blood, [[id number]]  (Normal)  Collected:  12/07/17 1424    Specimen:  Blood from Arm, Right Updated:  12/09/17 1431     Blood Culture No growth at 2 days    POC Glucose Fingerstick [[id number]]  (Abnormal) Collected:  12/09/17 1644    Specimen:  Blood Updated:  12/09/17 1650     Glucose 138 (H) mg/dL     Narrative:       Meter: [id number] : [id number] Caio Frias Nursing Assistant    POC Glucose Fingerstick [[id number]]  (Abnormal) Collected:  12/09/17 2111    Specimen:  Blood Updated:  12/09/17 2117     Glucose 139 (H) mg/dL     Narrative:       Meter: [id number] : [id number] Grzegorz Vazquez NURSING ASSISTANT    CBC (No Diff) [[id number]]  (Abnormal) Collected:  12/10/17 0417    Specimen:  Blood Updated:  12/10/17 0448     WBC 23.11 (H) 10*3/mm3      RBC 4.57 10*6/mm3      Hemoglobin 12.4 g/dL      Hematocrit 39.0 %      MCV 85.3 fL      MCH 27.1 pg      MCHC 31.8 g/dL      RDW 17.2 (H) %      RDW-SD 53.2 fl      MPV 9.0 fL      Platelets 441 10*3/mm3     Basic Metabolic Panel [[id number]]  (Abnormal) Collected:  12/10/17 0417    Specimen:  Blood Updated:  12/10/17 0510     Glucose 131 (H) mg/dL      BUN 17 mg/dL      Creatinine 0.76 mg/dL      Sodium 139 mmol/L      Potassium 4.5 mmol/L      Chloride 99 mmol/L      CO2 26.3 mmol/L      Calcium 9.2 mg/dL      eGFR Non African Amer 81 mL/min/1.73      BUN/Creatinine Ratio 22.4     Anion Gap 13.7 mmol/L     Narrative:       GFR Normal >60  Chronic Kidney Disease <60  Kidney Failure <15    POC Glucose Fingerstick [[id number]]  (Abnormal) Collected:  12/10/17 0724    Specimen:  Blood Updated:  12/10/17 0738     Glucose 147 (H) mg/dL     Narrative:       Meter: [id number] : [id number] Karen ARBOLEDA    Blood Culture - Blood, [[id number]]  (Abnormal) Collected:  12/07/17 1351    Specimen:  Blood from Arm, Right Updated:  12/10/17 0902     Blood Culture Abnormal Stain (A)      Corynebacterium species (A)      Probable contaminant requires clinical correlation, susceptibility not  performed unless requested by physician.    No definitive guidelines. All are susceptible to vancomycin. Resistance to penicillins & cephalosporins does occur.        Gram Stain Result Anaerobic Bottle Gram positive bacilli      Aerobic Bottle Gram positive bacilli    Narrative:         Blood culture does not meet the specified criteria for PCR identification.      POC Glucose Fingerstick [[id number]]  (Abnormal) Collected:  12/10/17 1116    Specimen:  Blood Updated:  12/10/17 1137     Glucose 135 (H) mg/dL     Narrative:       Meter: [id number] : [id number] Karen ARBOLEDA              Radiology:  Imaging Results (last 24 hours)     ** No results found for the last 24 hours. **          Cardiology:  ECG/EMG Results (last 24 hours)     ** No results found for the last 24 hours. **          I have reviewed consult notes.    Assessment and Plan:    1.Sepsis Resolved    2.  Abdominal cellulitis likely both fungal and bacterial Change to by mouth antibiotics likely home tomorrow    3.  Acute kidney injury has resolved we'll DC IV fluids    4.  Diabetes mellitus Some hypoglycemia secondary to IV steroids continue Accu-Cheks slight scale insulin    5.  COPD is stable    6.  Obstructive sleep apnea continue home CPAP    7.  Bipolar disorder nothing acute    8.  Hypothyroidism stable    9.  Chronic hypoxic respiratory failure to home O2    10.  Leukocytosis likely due to steroids will monitor

## 2017-12-10 NOTE — NURSING NOTE
Made Dr Guerrero currently on med surg unit aware of pt's preliminary blood culture results from 12/7/17, one results shows no growth in 2 days and the other results show abnormal stain, corynebacterium species.  No new orders rec'd related to this

## 2017-12-11 VITALS
WEIGHT: 287.5 LBS | BODY MASS INDEX: 49.08 KG/M2 | DIASTOLIC BLOOD PRESSURE: 95 MMHG | SYSTOLIC BLOOD PRESSURE: 170 MMHG | HEIGHT: 64 IN | HEART RATE: 88 BPM | OXYGEN SATURATION: 92 % | RESPIRATION RATE: 16 BRPM | TEMPERATURE: 97.3 F

## 2017-12-11 LAB
ANION GAP SERPL CALCULATED.3IONS-SCNC: 13.6 MMOL/L
BUN BLD-MCNC: 19 MG/DL (ref 6–20)
BUN/CREAT SERPL: 22.4 (ref 7–25)
CALCIUM SPEC-SCNC: 9.5 MG/DL (ref 8.6–10.5)
CHLORIDE SERPL-SCNC: 96 MMOL/L (ref 98–107)
CO2 SERPL-SCNC: 27.4 MMOL/L (ref 22–29)
CREAT BLD-MCNC: 0.85 MG/DL (ref 0.57–1)
DEPRECATED RDW RBC AUTO: 52.3 FL (ref 37–54)
ERYTHROCYTE [DISTWIDTH] IN BLOOD BY AUTOMATED COUNT: 16.9 % (ref 11.5–14.5)
GFR SERPL CREATININE-BSD FRML MDRD: 71 ML/MIN/1.73
GLUCOSE BLD-MCNC: 115 MG/DL (ref 65–99)
GLUCOSE BLDC GLUCOMTR-MCNC: 100 MG/DL (ref 70–130)
HCT VFR BLD AUTO: 40.3 % (ref 37–47)
HGB BLD-MCNC: 12.7 G/DL (ref 12–16)
MCH RBC QN AUTO: 26.7 PG (ref 27–31)
MCHC RBC AUTO-ENTMCNC: 31.5 G/DL (ref 31–37)
MCV RBC AUTO: 84.7 FL (ref 81–99)
PLATELET # BLD AUTO: 437 10*3/MM3 (ref 140–500)
PMV BLD AUTO: 8.9 FL (ref 7.4–10.4)
POTASSIUM BLD-SCNC: 4.5 MMOL/L (ref 3.5–5.2)
RBC # BLD AUTO: 4.76 10*6/MM3 (ref 4.2–5.4)
SODIUM BLD-SCNC: 137 MMOL/L (ref 136–145)
WBC NRBC COR # BLD: 18.81 10*3/MM3 (ref 4.8–10.8)

## 2017-12-11 PROCEDURE — 80048 BASIC METABOLIC PNL TOTAL CA: CPT | Performed by: FAMILY MEDICINE

## 2017-12-11 PROCEDURE — 63710000001 PREDNISONE PER 5 MG: Performed by: FAMILY MEDICINE

## 2017-12-11 PROCEDURE — 25010000002 ENOXAPARIN PER 10 MG: Performed by: FAMILY MEDICINE

## 2017-12-11 PROCEDURE — 82962 GLUCOSE BLOOD TEST: CPT

## 2017-12-11 PROCEDURE — 85027 COMPLETE CBC AUTOMATED: CPT | Performed by: FAMILY MEDICINE

## 2017-12-11 PROCEDURE — 94799 UNLISTED PULMONARY SVC/PX: CPT

## 2017-12-11 RX ORDER — PREDNISONE 5 MG/1
1 TABLET ORAL TAKE AS DIRECTED
Qty: 21 TABLET | Refills: 0 | Status: SHIPPED | OUTPATIENT
Start: 2017-12-11 | End: 2018-03-23

## 2017-12-11 RX ORDER — FLUCONAZOLE 200 MG/1
200 TABLET ORAL EVERY 24 HOURS
Qty: 5 TABLET | Refills: 0 | Status: SHIPPED | OUTPATIENT
Start: 2017-12-11 | End: 2017-12-16

## 2017-12-11 RX ORDER — NICOTINE 21 MG/24HR
1 PATCH, TRANSDERMAL 24 HOURS TRANSDERMAL EVERY 24 HOURS
Qty: 30 PATCH | Refills: 0 | Status: SHIPPED | OUTPATIENT
Start: 2017-12-11 | End: 2018-03-23

## 2017-12-11 RX ADMIN — NYSTATIN: 100000 POWDER TOPICAL at 08:26

## 2017-12-11 RX ADMIN — NYSTATIN: 100000 OINTMENT TOPICAL at 09:24

## 2017-12-11 RX ADMIN — SPIRONOLACTONE 25 MG: 25 TABLET ORAL at 09:24

## 2017-12-11 RX ADMIN — SULFAMETHOXAZOLE AND TRIMETHOPRIM 160 MG: 800; 160 TABLET ORAL at 08:26

## 2017-12-11 RX ADMIN — PERPHENAZINE 4 MG: 4 TABLET, FILM COATED ORAL at 08:26

## 2017-12-11 RX ADMIN — DIVALPROEX SODIUM 500 MG: 500 TABLET, DELAYED RELEASE ORAL at 08:26

## 2017-12-11 RX ADMIN — OLMESARTAN MEDOXOMIL 20 MG: 20 TABLET, FILM COATED ORAL at 08:26

## 2017-12-11 RX ADMIN — IPRATROPIUM BROMIDE AND ALBUTEROL SULFATE 3 ML: .5; 3 SOLUTION RESPIRATORY (INHALATION) at 07:02

## 2017-12-11 RX ADMIN — FUROSEMIDE 20 MG: 20 TABLET ORAL at 08:26

## 2017-12-11 RX ADMIN — PREDNISONE 10 MG: 10 TABLET ORAL at 08:26

## 2017-12-11 RX ADMIN — LEVOTHYROXINE SODIUM 50 MCG: 50 TABLET ORAL at 06:16

## 2017-12-11 RX ADMIN — ENOXAPARIN SODIUM 40 MG: 40 INJECTION SUBCUTANEOUS at 03:59

## 2017-12-11 RX ADMIN — ALLOPURINOL 300 MG: 300 TABLET ORAL at 08:26

## 2017-12-11 NOTE — PLAN OF CARE
Problem: Cellulitis (Adult)  Goal: Signs and Symptoms of Listed Potential Problems Will be Absent or Manageable (Cellulitis)  Outcome: Ongoing (interventions implemented as appropriate)    12/11/17 0452   Cellulitis   Problems Assessed (Cellulitis) all   Problems Present (Cellulitis) infection;situational response         Problem: Skin Integrity Impairment, Risk/Actual (Adult)  Goal: Identify Related Risk Factors and Signs and Symptoms  Outcome: Ongoing (interventions implemented as appropriate)    12/11/17 0452   Skin Integrity Impairment, Risk/Actual   Skin Integrity Impairment, Risk/Actual: Related Risk Factors edema   Signs and Symptoms (Skin Integrity Impairment) edema;inflammation       Goal: Skin Integrity/Wound Healing  Outcome: Ongoing (interventions implemented as appropriate)    12/11/17 0452   Skin Integrity Impairment, Risk/Actual (Adult)   Skin Integrity/Wound Healing making progress toward outcome         Problem: Fall Risk (Adult)  Goal: Identify Related Risk Factors and Signs and Symptoms  Outcome: Ongoing (interventions implemented as appropriate)    12/11/17 0452   Fall Risk   Fall Risk: Related Risk Factors objects hard to reach   Fall Risk: Signs and Symptoms presence of risk factors       Goal: Absence of Falls  Outcome: Ongoing (interventions implemented as appropriate)    12/11/17 0452   Fall Risk (Adult)   Absence of Falls making progress toward outcome         Problem: Patient Care Overview (Adult)  Goal: Plan of Care Review  Outcome: Ongoing (interventions implemented as appropriate)    12/11/17 0452   Coping/Psychosocial Response Interventions   Plan Of Care Reviewed With patient   Patient Care Overview   Progress improving       Goal: Discharge Needs Assessment  Outcome: Ongoing (interventions implemented as appropriate)    12/11/17 0452   Discharge Needs Assessment   Concerns To Be Addressed discharge planning concerns   Readmission Within The Last 30 Days no previous admission in last  30 days   Self-Care   Equipment Currently Used at Home oxygen;respiratory supplies;bipap/ cpap;walker, rolling;cane, straight   Living Environment   Transportation Available family or friend will provide   Current Health   Anticipated Changes Related to Illness none

## 2017-12-11 NOTE — PLAN OF CARE
Problem: Cellulitis (Adult)  Goal: Signs and Symptoms of Listed Potential Problems Will be Absent or Manageable (Cellulitis)  Outcome: Ongoing (interventions implemented as appropriate)    Problem: Skin Integrity Impairment, Risk/Actual (Adult)  Goal: Identify Related Risk Factors and Signs and Symptoms  Outcome: Ongoing (interventions implemented as appropriate)  Goal: Skin Integrity/Wound Healing  Outcome: Ongoing (interventions implemented as appropriate)    Problem: Fall Risk (Adult)  Goal: Identify Related Risk Factors and Signs and Symptoms  Outcome: Outcome(s) achieved Date Met:  12/11/17  Goal: Absence of Falls  Outcome: Outcome(s) achieved Date Met:  12/11/17    Problem: Patient Care Overview (Adult)  Goal: Plan of Care Review  Outcome: Outcome(s) achieved Date Met:  12/11/17

## 2017-12-11 NOTE — PROGRESS NOTES
"Daily Progress Note:    Subjective: She is doing well.  Doppler is resolved rash is improved no shortness of breath or chest pain    Flowsheet Rows         First Filed Value    Admission Height  162.6 cm (64\") Documented at 12/07/2017 1142    Admission Weight  127 kg (279 lb) Documented at 12/07/2017 1142          Patient Vitals for the past 24 hrs:   BP Temp Temp src Pulse Resp SpO2 Weight   12/11/17 0659 170/95 97.3 °F (36.3 °C) Oral 94 18 95 % 130 kg (287 lb 8 oz)   12/11/17 0001 147/71 96.9 °F (36.1 °C) Oral 92 18 95 % -   12/10/17 2047 - - - 76 20 94 % -   12/10/17 1915 143/92 97.7 °F (36.5 °C) Oral 81 20 95 % -   12/10/17 1608 - - - 72 20 94 % -   12/10/17 1556 159/92 98.6 °F (37 °C) Oral 77 21 93 % -   12/10/17 1132 - - - 89 20 92 % -   12/10/17 1100 144/94 97.6 °F (36.4 °C) Oral 74 21 91 % -   12/10/17 0920 - - - - - - (!) 151 kg (333 lb 12.8 oz)       130 kg (287 lb 8 oz)      Intake/Output Summary (Last 24 hours) at 12/11/17 0740  Last data filed at 12/10/17 1700   Gross per 24 hour   Intake             1440 ml   Output             1200 ml   Net              240 ml       Review of Systems   Constitutional: Negative for appetite change and fatigue.   Respiratory: Negative for cough, chest tightness, shortness of breath and wheezing.    Cardiovascular: Negative for chest pain.   Gastrointestinal: Negative for abdominal distention, diarrhea, nausea and vomiting.   Genitourinary: Negative for frequency.   Skin: Negative for rash.   Psychiatric/Behavioral: Negative for agitation.       Physical Exam   Constitutional: She appears well-developed and well-nourished.   Morbidly obese   HENT:   Head: Normocephalic.   Mouth/Throat: Oropharynx is clear and moist.   Eyes: Conjunctivae are normal.   Neck: Normal range of motion. No JVD present. No thyromegaly present.   Cardiovascular: Normal rate, regular rhythm and normal heart sounds.    No murmur heard.  Pulmonary/Chest: Effort normal and breath sounds normal. No " respiratory distress. She has no wheezes. She has no rales.   Abdominal: Soft. Bowel sounds are normal. She exhibits no distension. There is no tenderness. There is no guarding.   Neurological: She is alert.   Skin: Skin is warm and dry. Rash noted.   Lower abdominal folds mild redness much improved   Nursing note and vitals reviewed.          Medication Review:   I have reviewed the patient's current medication list      allopurinol 300 mg Oral Daily   divalproex 500 mg Oral TID   enoxaparin 40 mg Subcutaneous Q12H   fluconazole 200 mg Oral Q24H   furosemide 20 mg Oral BID   insulin aspart 0-7 Units Subcutaneous 4x Daily AC & at Bedtime   ipratropium-albuterol 3 mL Nebulization 4x Daily - RT   levothyroxine 50 mcg Oral Q AM   nicotine 1 patch Transdermal Q24H   nystatin  Topical Q12H   nystatin  Topical Q12H   olmesartan 20 mg Oral Q24H   perphenazine 4 mg Oral BID   Pharmacy Consult - Pharmacy to dose  Does not apply Once   predniSONE 10 mg Oral TID With Meals   spironolactone 25 mg Oral Daily   sulfamethoxazole-trimethoprim 1 tablet Oral Q12H           Pharmacy to dose vancomycin            Labs:    Results from last 7 days  Lab Units 12/11/17  0357 12/10/17  0417 12/09/17  0336 12/08/17  0429 12/07/17  1351   WBC 10*3/mm3 18.81* 23.11* 20.88* 15.64* 18.97*   HEMOGLOBIN g/dL 12.7 12.4 12.0 13.1 14.0   HEMATOCRIT % 40.3 39.0 38.5 41.3 43.6   PLATELETS 10*3/mm3 437 441 401 412 441       Results from last 7 days  Lab Units 12/11/17  0357 12/10/17  0417 12/09/17  0336 12/08/17  0428 12/07/17  1351   SODIUM mmol/L 137 139 136 132* 132*   POTASSIUM mmol/L 4.5 4.5 4.8 4.3 3.9   CHLORIDE mmol/L 96* 99 99 95* 86*   CO2 mmol/L 27.4 26.3 26.7 26.1 27.5   BUN mg/dL 19 17 13 11 13   CREATININE mg/dL 0.85 0.76 0.78 1.11* 1.69*   CALCIUM mg/dL 9.5 9.2 9.0 8.5* 9.2   BILIRUBIN mg/dL  --   --   --   --  0.3   ALK PHOS U/L  --   --   --   --  97   ALT (SGPT) U/L  --   --   --   --  7   AST (SGOT) U/L  --   --   --   --  9    GLUCOSE mg/dL 115* 131* 141* 130* 125*           Lab Results (last 24 hours)     Procedure Component Value Units Date/Time    Blood Culture - Blood, [[id number]]  (Abnormal) Collected:  12/07/17 1351    Specimen:  Blood from Arm, Right Updated:  12/10/17 0902     Blood Culture Abnormal Stain (A)      Corynebacterium species (A)      Probable contaminant requires clinical correlation, susceptibility not performed unless requested by physician.    No definitive guidelines. All are susceptible to vancomycin. Resistance to penicillins & cephalosporins does occur.        Gram Stain Result Anaerobic Bottle Gram positive bacilli      Aerobic Bottle Gram positive bacilli    Narrative:         Blood culture does not meet the specified criteria for PCR identification.      POC Glucose Fingerstick [[id number]]  (Abnormal) Collected:  12/10/17 1116    Specimen:  Blood Updated:  12/10/17 1137     Glucose 135 (H) mg/dL     Narrative:       Meter: [id number] : [id number] Conjur    Blood Culture - Blood, [[id number]]  (Normal) Collected:  12/07/17 1424    Specimen:  Blood from Arm, Right Updated:  12/10/17 1432     Blood Culture No growth at 3 days    POC Glucose Fingerstick [[id number]]  (Abnormal) Collected:  12/10/17 1714    Specimen:  Blood Updated:  12/10/17 1724     Glucose 148 (H) mg/dL     Narrative:       Meter: [id number] : [id number] Conjur    POC Glucose Fingerstick [[id number]]  (Abnormal) Collected:  12/10/17 2012    Specimen:  Blood Updated:  12/10/17 2018     Glucose 132 (H) mg/dL     Narrative:       Meter: [id number] : [id number] [facility]    CBC (No Diff) [[id number]]  (Abnormal) Collected:  12/11/17 0357    Specimen:  Blood Updated:  12/11/17 0449     WBC 18.81 (H) 10*3/mm3      RBC 4.76 10*6/mm3      Hemoglobin 12.7 g/dL      Hematocrit 40.3 %      MCV 84.7 fL      MCH 26.7 (L) pg      MCHC 31.5 g/dL      RDW 16.9 (H) %      RDW-SD 52.3 fl      MPV 8.9 fL       Platelets 437 10*3/mm3     Basic Metabolic Panel [[id number]]  (Abnormal) Collected:  12/11/17 0357    Specimen:  Blood Updated:  12/11/17 0524     Glucose 115 (H) mg/dL      BUN 19 mg/dL      Creatinine 0.85 mg/dL      Sodium 137 mmol/L      Potassium 4.5 mmol/L      Chloride 96 (L) mmol/L      CO2 27.4 mmol/L      Calcium 9.5 mg/dL      eGFR Non African Amer 71 mL/min/1.73      BUN/Creatinine Ratio 22.4     Anion Gap 13.6 mmol/L     Narrative:       GFR Normal >60  Chronic Kidney Disease <60  Kidney Failure <15    POC Glucose Fingerstick [[id number]]  (Normal) Collected:  12/11/17 0717    Specimen:  Blood Updated:  12/11/17 0723     Glucose 100 mg/dL     Narrative:       Meter: [id number] : [id number] Abel Dugan NURSING ASSISTANT              Radiology:  Imaging Results (last 24 hours)     ** No results found for the last 24 hours. **          Cardiology:  ECG/EMG Results (last 24 hours)     ** No results found for the last 24 hours. **          I have reviewed consult notes.    Assessment and Plan:        1.Sepsis Resolved    2.  Abdominal cellulitis  home today    3.  Acute kidney injury    4.  Diabetes mellitus Some hypoglycemia secondary to IV steroids continue Accu-Cheks slight scale insulin    5.  COPD is stable    6.  Obstructive sleep apnea continue home CPAP    7.  Bipolar disorder nothing acute    8.  Hypothyroidism stable    9.  Chronic hypoxic respiratory failure to home O2    10.  Leukocytosis

## 2017-12-11 NOTE — NURSING NOTE
Case Management Discharge Note    Final Note: Discharged home    Discharge Placement     No information found             Discharge Codes: 01  Discharge to home

## 2017-12-11 NOTE — DISCHARGE SUMMARY
Kirsten Centeno  1970  [id number]        Discharge Summary    Date of Admission: 12/7/2017  Date of Discharge:  12/11/2017    Primary Discharge Diagnoses:   Sepsis  Abdominal wall cellulitis  Acute kidney injury resolved    Secondary Discharge Diagnoses:   1.  Hypertension.   2.  Hyperlipidemia.   3.  Bipolar disorder.   4.  Generalized anxiety disorder.  5.  Hypothyroidism  6.  Diabetes.  7.   obstructive sleep apnea  8.  Hyperuricemia.  9.  COPD  PCP  Patient Care Team:  Rob Cunha MD as PCP - General  Rob Cunha MD as PCP - Family Medicine    Consults:   Consults     No orders found from 11/8/2017 to 12/8/2017.            History of Present Illness:    47-year-old white female patient who I saw in the office yesterday with complaints of abdominal pain nausea and some diarrhea.  She says she noticed increasing pain and a rash abdominal fullness that started about 4-5 days ago it got progressively worse.  She's had some mild diarrhea noted and been nauseated no vomiting no fever chills she does complain of some generalized malaise.  She was found to have moderately severe abdominal cellulitis in her folds likely fungal with secondary bacterial infection was placed on oral antifungals as well as oral antibiotics.  She returned today stating the office stating she did not feel any better and her white count was elevated to 20,007 was sent to the emergency room for evaluation treatment.     Hospital Course  After evaluation the emergency room patient was diagnosed to have sepsis and abdominal cellulitis.  Patient was pancultured start an IV and vancomycin.  Aggressive wound management was done for macerated skin films and severe abdominal cellulitis.  She was bolused with several liters of fluids and started IV fluids resuscitation which improved renal functions and blood pressures.  Her bowels much better than 48 hours she was changed from IV to by mouth antibiotics with Bactrim which is tolerating.   Cultures remain negative function returned to normal.  She did develop some leukocytosis probably secondary to IV steroids and this improved.  She said on satisfactory condition on same medicines she came in on except for a steroid taper pack and by mouth Diflucan.  She will follow in office approximately one week      Operations and Procedures Performed:       Ct Abdomen Pelvis Without Contrast    Result Date: 12/7/2017  Narrative: CT ABDOMEN/PELVIS, NONCONTRAST, 12/07/2017:  HISTORY: 47-year-old female in the ED complaining of four day history of lower abdomen pain.  TECHNIQUE: CT examination of the abdomen and pelvis without oral or IV contrast using kidney stone protocol, as requested. Radiation dose reduction techniques were utilized, including automated exposure control and exposure modulation based on body size.  COMPARISON: *  CT abdomen/pelvis, 07/20/2017.  ABDOMEN FINDINGS: Both kidneys, both ureters and the urinary bladder are normal in noncontrast CT appearance. No visible nephrolithiasis or evidence of urinary obstruction.  No gallbladder distention or bile duct dilatation. Liver, pancreas and spleen are normal in size and appearance without contrast.  Small bowel and colon are normal in caliber and appearance, as imaged. Normal appendix.  Skin thickening in the inferior portion of the large abdominal pannus with some subcutaneous soft tissue stranding on the right may represent skin inflammation/cellulitis. No abscess or other fluid collection is demonstrated.  PELVIS FINDINGS: Uterus, ovaries, urinary bladder and rectum are within normal limits.  Limited lung base images show linear atelectasis or scarring in the left posterior costophrenic angle.      Impression: 1. No acute abnormality within the abdomen or pelvis. 2. Possible skin inflammation in the right lower quadrant abdominal wall. No visible abscess.  This report was finalized on 12/7/2017 1:29 PM by Dr. Wolf Flanagan MD.      Xr Chest 2  View    Result Date: 12/7/2017  Narrative: CHEST X-RAY, 12/07/2017:  HISTORY: 47-year-old female in the ED complaining of one history of shortness of air and cough.  TECHNIQUE: PA and lateral upright chest series.  FINDINGS: The lungs are expanded and clear. Heart size and pulmonary vascularity are within normal limits. No visible pulmonary infiltrate or pleural effusion. Mild thoracolumbar spinal curvature.      Impression: No active disease.  This report was finalized on 12/7/2017 12:24 PM by Dr. Wolf Flanagan MD.      Xr Knee 3 View Bilateral    Impression: Ordering physician's impression is located in the Encounter Note dated 11/17/17.       Last Lab Results:     Results from last 7 days  Lab Units 12/11/17  0357 12/10/17  0417 12/09/17  0336 12/08/17  0429 12/07/17  1351   WBC 10*3/mm3 18.81* 23.11* 20.88* 15.64* 18.97*   HEMOGLOBIN g/dL 12.7 12.4 12.0 13.1 14.0   HEMATOCRIT % 40.3 39.0 38.5 41.3 43.6   PLATELETS 10*3/mm3 437 441 401 412 441       Results from last 7 days  Lab Units 12/11/17  0357 12/10/17  0417 12/09/17  0336 12/08/17  0428 12/07/17  1351   SODIUM mmol/L 137 139 136 132* 132*   POTASSIUM mmol/L 4.5 4.5 4.8 4.3 3.9   CHLORIDE mmol/L 96* 99 99 95* 86*   CO2 mmol/L 27.4 26.3 26.7 26.1 27.5   BUN mg/dL 19 17 13 11 13   CREATININE mg/dL 0.85 0.76 0.78 1.11* 1.69*   CALCIUM mg/dL 9.5 9.2 9.0 8.5* 9.2   BILIRUBIN mg/dL  --   --   --   --  0.3   ALK PHOS U/L  --   --   --   --  97   ALT (SGPT) U/L  --   --   --   --  7   AST (SGOT) U/L  --   --   --   --  9   GLUCOSE mg/dL 115* 131* 141* 130* 125*           Lab Results (last 24 hours)     Procedure Component Value Units Date/Time    Blood Culture - Blood, [[id number]]  (Abnormal) Collected:  12/07/17 1351    Specimen:  Blood from Arm, Right Updated:  12/10/17 0902     Blood Culture Abnormal Stain (A)      Corynebacterium species (A)      Probable contaminant requires clinical correlation, susceptibility not performed unless requested by  physician.    No definitive guidelines. All are susceptible to vancomycin. Resistance to penicillins & cephalosporins does occur.        Gram Stain Result Anaerobic Bottle Gram positive bacilli      Aerobic Bottle Gram positive bacilli    Narrative:         Blood culture does not meet the specified criteria for PCR identification.      POC Glucose Fingerstick [[id number]]  (Abnormal) Collected:  12/10/17 1116    Specimen:  Blood Updated:  12/10/17 1137     Glucose 135 (H) mg/dL     Narrative:       Meter: [id number] : [id number] Dr. TATTOFF CERT    Blood Culture - Blood, [[id number]]  (Normal) Collected:  12/07/17 1424    Specimen:  Blood from Arm, Right Updated:  12/10/17 1432     Blood Culture No growth at 3 days    POC Glucose Fingerstick [[id number]]  (Abnormal) Collected:  12/10/17 1714    Specimen:  Blood Updated:  12/10/17 1724     Glucose 148 (H) mg/dL     Narrative:       Meter: [id number] : [id number] Dr. TATTOFF CERT    POC Glucose Fingerstick [[id number]]  (Abnormal) Collected:  12/10/17 2012    Specimen:  Blood Updated:  12/10/17 2018     Glucose 132 (H) mg/dL     Narrative:       Meter: [id number] : [id number] Cheyenne March [facility]    CBC (No Diff) [[id number]]  (Abnormal) Collected:  12/11/17 0357    Specimen:  Blood Updated:  12/11/17 0449     WBC 18.81 (H) 10*3/mm3      RBC 4.76 10*6/mm3      Hemoglobin 12.7 g/dL      Hematocrit 40.3 %      MCV 84.7 fL      MCH 26.7 (L) pg      MCHC 31.5 g/dL      RDW 16.9 (H) %      RDW-SD 52.3 fl      MPV 8.9 fL      Platelets 437 10*3/mm3     Basic Metabolic Panel [[id number]]  (Abnormal) Collected:  12/11/17 0357    Specimen:  Blood Updated:  12/11/17 0524     Glucose 115 (H) mg/dL      BUN 19 mg/dL      Creatinine 0.85 mg/dL      Sodium 137 mmol/L      Potassium 4.5 mmol/L      Chloride 96 (L) mmol/L      CO2 27.4 mmol/L      Calcium 9.5 mg/dL      eGFR Non African Amer 71 mL/min/1.73      BUN/Creatinine Ratio 22.4     Anion Gap 13.6 mmol/L      Narrative:       GFR Normal >60  Chronic Kidney Disease <60  Kidney Failure <15    POC Glucose Fingerstick [[id number]]  (Normal) Collected:  12/11/17 0717    Specimen:  Blood Updated:  12/11/17 0723     Glucose 100 mg/dL     Narrative:       Meter: [id number] : [id number] Abel Dugan NURSING ASSISTANT              PROCEDURES          Allergies:  is allergic to penicillins.    Antonio  reviewed    Discharge Medications:   Kirsten Centeno   Home Medication Instructions IBRAHIMA:[id number]    Printed on:12/11/17 0745   Medication Information                      allopurinol (ZYLOPRIM) 300 MG tablet  Take 300 mg by mouth Daily.             amphetamine-dextroamphetamine (ADDERALL) 20 MG tablet  Take 20 mg by mouth Daily.             divalproex (DEPAKOTE) 500 MG DR tablet  Take 500 mg by mouth 3 (Three) Times a Day.             fluconazole (DIFLUCAN) 200 MG tablet  Take 1 tablet by mouth Daily for 5 doses. Indications: Infection of the Skin and/or Related Soft Tissue             furosemide (LASIX) 20 MG tablet  Take 20 mg by mouth 2 (Two) Times a Day.             levothyroxine (SYNTHROID, LEVOTHROID) 50 MCG tablet  Take 50 mcg by mouth Daily.             nicotine (NICODERM CQ) 21 MG/24HR patch  Place 1 patch on the skin Daily.             nystatin (MYCOSTATIN) 473159 UNIT/GM powder  Apply  topically 2 (Two) Times a Day. APPLY TO AFFECTED AREA TWICE DAILY X 14 DAYS             olmesartan (BENICAR) 40 MG tablet               Omega-3 Fatty Acids (OMEGA 3 PO)  Take  by mouth.             perphenazine (TRILAFON) 4 MG tablet  Take 4 mg by mouth 2 (Two) Times a Day.             pravastatin (PRAVACHOL) 40 MG tablet  Take 40 mg by mouth Daily.             spironolactone (ALDACTONE) 25 MG tablet  Take 25 mg by mouth Daily.             sulfamethoxazole-trimethoprim (BACTRIM DS,SEPTRA DS) 800-160 MG per tablet  Take 1 tablet by mouth 2 (Two) Times a Day.             traMADol (ULTRAM) 50 MG tablet  Take 50 mg by mouth Every 6 (Six)  Hours As Needed for Moderate Pain .               Prednisone 5 mg dose pack    Condition on Discharge:  Stable  Discharge Disposition  home    Visiting Nurse:    No     Home PT/OT:  No     Home Safety Evaluation:  No     DME  none    Discharge Diet:           Dietary Orders            Start     Ordered    12/10/17 1800  DIET MESSAGE Please send lays baked original chips now and with pt's meals thanks  If unable to send please let pt know thanks  Daily With Breakfast, Lunch & Dinner     Comments:  Please send lays baked original chips now and with pt's meals thanks  If unable to send please let pt know thanks    12/10/17 1213    12/07/17 1911  Diet Regular; Cardiac  Diet Effective Now     Question Answer Comment   Diet Texture / Consistency Regular    Common Modifiers Cardiac        12/07/17 1910          Activity at Discharge:  As tolerated      Follow-up Appointments:  With my office as instructed    Test Results Pending at Discharge   Order Current Status    Blood Culture - Blood, Preliminary result    Blood Culture - Blood, Preliminary result           Anitra Reinoso MD  12/11/17  7:45 AM

## 2017-12-12 LAB
BACTERIA SPEC AEROBE CULT: ABNORMAL
BACTERIA SPEC AEROBE CULT: ABNORMAL
BACTERIA SPEC AEROBE CULT: NORMAL
GRAM STN SPEC: ABNORMAL
GRAM STN SPEC: ABNORMAL

## 2018-03-23 ENCOUNTER — OFFICE VISIT (OUTPATIENT)
Dept: ORTHOPEDIC SURGERY | Facility: CLINIC | Age: 48
End: 2018-03-23

## 2018-03-23 DIAGNOSIS — M17.0 PRIMARY OSTEOARTHRITIS OF BOTH KNEES: Primary | ICD-10-CM

## 2018-03-23 PROCEDURE — 20610 DRAIN/INJ JOINT/BURSA W/O US: CPT | Performed by: NURSE PRACTITIONER

## 2018-03-23 PROCEDURE — 99213 OFFICE O/P EST LOW 20 MIN: CPT | Performed by: NURSE PRACTITIONER

## 2018-03-23 RX ORDER — TRAMADOL HYDROCHLORIDE 50 MG/1
50 TABLET ORAL EVERY 6 HOURS PRN
Qty: 12 TABLET | Refills: 0 | Status: SHIPPED | OUTPATIENT
Start: 2018-03-23 | End: 2018-06-12

## 2018-03-23 RX ADMIN — BETAMETHASONE SODIUM PHOSPHATE AND BETAMETHASONE ACETATE 12 MG: 3; 3 INJECTION, SUSPENSION INTRA-ARTICULAR; INTRALESIONAL; INTRAMUSCULAR; SOFT TISSUE at 13:31

## 2018-03-23 RX ADMIN — LIDOCAINE HYDROCHLORIDE 8 ML: 10 INJECTION, SOLUTION EPIDURAL; INFILTRATION; INTRACAUDAL; PERINEURAL at 13:31

## 2018-03-23 NOTE — PROGRESS NOTES
Subjective:     Patient ID: Kirsten Centeno is a 48 y.o. female.    Chief Complaint: primary osteoarthritis bilateral knees    History of Present Illness    Ms. Centeno presents back to clinic for follow-up bilateral knee pain. She has been seen by Dr. Zelaya in past for bilateral knee pain however is new to this APRN. Last received corticosteroid injections in November 2017 with Dr. Zelaya. Reports pain has returned within the last one week, present over medial joint line. Pain greatest at left knee. Rates pain at 9/10 aching and throbbing in nature. Increased pain noted with all ambulatory activities and occasional experiences pain at rest. She has been walking with walker secondary to pain. Denies presence of numbness and tingling at bilateral lower extremities. Denies all other concerns present at this time.       Social History     Occupational History   • Not on file.     Social History Main Topics   • Smoking status: Current Every Day Smoker     Packs/day: 2.00   • Smokeless tobacco: Not on file   • Alcohol use No   • Drug use: Unknown   • Sexual activity: Not on file      Past Medical History:   Diagnosis Date   • COPD (chronic obstructive pulmonary disease)    • Diabetes    • Gout    • HTN (hypertension)    • Hyperlipidemia    • Hyperthyroidism    • Neuropathy    • Sleep apnea      Past Surgical History:   Procedure Laterality Date   • MANDIBLE FRACTURE SURGERY         Family History   Problem Relation Age of Onset   • Cervical cancer Maternal Grandmother    • Cancer Maternal Grandmother    • Hypertension Father    • Heart disease Father          Review of Systems   Constitutional: Negative for chills, diaphoresis, fever and unexpected weight change.   HENT: Negative for hearing loss, nosebleeds, sore throat and tinnitus.    Eyes: Negative for pain and visual disturbance.   Respiratory: Negative for cough, shortness of breath and wheezing.    Cardiovascular: Negative for chest pain and palpitations.    Gastrointestinal: Negative for abdominal pain, diarrhea, nausea and vomiting.   Endocrine: Negative for cold intolerance, heat intolerance and polydipsia.   Genitourinary: Negative for difficulty urinating, dysuria and hematuria.   Musculoskeletal: Positive for arthralgias and joint swelling. Negative for myalgias.   Skin: Negative for rash and wound.   Allergic/Immunologic: Negative for environmental allergies.   Neurological: Positive for numbness. Negative for dizziness and syncope.   Hematological: Does not bruise/bleed easily.   Psychiatric/Behavioral: Negative for dysphoric mood and sleep disturbance. The patient is not nervous/anxious.    All other systems reviewed and are negative.          Objective:  Physical Exam    General: No acute distress.  Eyes: conjunctiva clear; pupils equally round and reactive  ENT: external ears and nose atraumatic; oropharynx clear  CV: no peripheral edema  Resp: normal respiratory effort  Skin: no rashes or wounds; normal turgor  Psych: mood and affect appropriate; recent and remote memory intact    There were no vitals filed for this visit.  There were no vitals filed for this visit.  There is no height or weight on file to calculate BMI.     Right Knee Exam     Tenderness   The patient is experiencing tenderness in the medial joint line.    Range of Motion   Extension: 5   Flexion: 90     Tests   Lachman:  Anterior - 1+    Posterior - negative  Drawer:       Anterior - negative      Varus: negative  Valgus: negative    Other   Erythema: absent  Scars: absent  Sensation: normal  Pulse: present  Swelling: mild    Comments:  Crepitus throughout arc of motion      Left Knee Exam     Tenderness   The patient is experiencing tenderness in the medial joint line.    Range of Motion   Extension: 5   Flexion: 90     Tests   Lachman:  Anterior - 1+    Posterior - negative  Drawer:       Anterior - negative       Varus: negative  Valgus: negative    Other   Erythema: absent  Scars:  absent  Sensation: normal  Pulse: present  Swelling: mild    Comments:  Crepitus throughout arc of motion             Assessment:       1. Primary osteoarthritis of both knees          Plan:  1. Discussed plan of care with patient. Wishes to proceed with corticosteroid injection bilateral knees. Will plan to see her back as needed. We will have to submit in future for viscosupplement injections if not improving with corticosteroid injections in future. Medicare is secondary insurance for patient. Patient verbalized understanding of all information and agrees with plan of care. Denies all other concerns present at this time.      Large Joint Arthrocentesis  Date/Time: 3/23/2018 1:31 PM  Consent given by: patient  Site marked: site marked  Supporting Documentation  Indications: pain   Procedure Details  Location: knee - Knee joint: bilateral.  Needle size: 22 G  Approach: superior  Medications administered: 12 mg betamethasone acetate-betamethasone sodium phosphate 6 (3-3) MG/ML; 8 mL lidocaine PF 1% 1 %  Patient tolerance: patient tolerated the procedure well with no immediate complications           Orders:  No orders of the defined types were placed in this encounter.      I ordered and reviewed the CHAYITO today.     Dictated utilizing Dragon dictation

## 2018-03-31 RX ORDER — LIDOCAINE HYDROCHLORIDE 10 MG/ML
8 INJECTION, SOLUTION EPIDURAL; INFILTRATION; INTRACAUDAL; PERINEURAL
Status: COMPLETED | OUTPATIENT
Start: 2018-03-23 | End: 2018-03-23

## 2018-03-31 RX ORDER — BETAMETHASONE SODIUM PHOSPHATE AND BETAMETHASONE ACETATE 3; 3 MG/ML; MG/ML
12 INJECTION, SUSPENSION INTRA-ARTICULAR; INTRALESIONAL; INTRAMUSCULAR; SOFT TISSUE
Status: COMPLETED | OUTPATIENT
Start: 2018-03-23 | End: 2018-03-23

## 2018-05-01 ENCOUNTER — OFFICE VISIT (OUTPATIENT)
Dept: ORTHOPEDIC SURGERY | Facility: CLINIC | Age: 48
End: 2018-05-01

## 2018-05-01 VITALS — HEIGHT: 64 IN | BODY MASS INDEX: 48.83 KG/M2 | WEIGHT: 286 LBS

## 2018-05-01 DIAGNOSIS — M17.0 PRIMARY OSTEOARTHRITIS OF BOTH KNEES: Primary | ICD-10-CM

## 2018-05-01 PROCEDURE — 99213 OFFICE O/P EST LOW 20 MIN: CPT | Performed by: ORTHOPAEDIC SURGERY

## 2018-05-01 RX ORDER — TOPIRAMATE 50 MG/1
TABLET, FILM COATED ORAL
COMMUNITY
Start: 2018-04-26 | End: 2018-06-12

## 2018-05-01 RX ORDER — PANTOPRAZOLE SODIUM 40 MG/1
TABLET, DELAYED RELEASE ORAL
COMMUNITY
Start: 2018-04-16 | End: 2018-06-12

## 2018-05-01 RX ORDER — DIVALPROEX SODIUM 500 MG/1
500 TABLET, EXTENDED RELEASE ORAL ONCE
COMMUNITY
Start: 2018-04-19

## 2018-05-01 RX ORDER — ESZOPICLONE 3 MG/1
3 TABLET, FILM COATED ORAL NIGHTLY
COMMUNITY
Start: 2018-04-20 | End: 2019-09-26 | Stop reason: HOSPADM

## 2018-05-01 RX ORDER — MELOXICAM 15 MG/1
15 TABLET ORAL DAILY
COMMUNITY
Start: 2018-02-28

## 2018-05-01 RX ORDER — ACETAMINOPHEN AND CODEINE PHOSPHATE 300; 30 MG/1; MG/1
TABLET ORAL
COMMUNITY
Start: 2018-04-27 | End: 2018-06-12

## 2018-05-01 RX ORDER — PERPHENAZINE 8 MG/1
16 TABLET ORAL 2 TIMES DAILY
COMMUNITY
Start: 2018-04-19 | End: 2018-06-12

## 2018-05-01 RX ORDER — OMEPRAZOLE 40 MG/1
40 CAPSULE, DELAYED RELEASE ORAL DAILY
COMMUNITY
Start: 2018-04-10

## 2018-05-01 RX ORDER — OMEGA-3-ACID ETHYL ESTERS 1 G/1
CAPSULE, LIQUID FILLED ORAL
COMMUNITY
Start: 2018-04-26 | End: 2018-06-12

## 2018-05-01 RX ORDER — LEVOTHYROXINE SODIUM 0.03 MG/1
25 TABLET ORAL DAILY
COMMUNITY
Start: 2018-04-19

## 2018-05-01 RX ORDER — PIOGLITAZONEHYDROCHLORIDE 15 MG/1
15 TABLET ORAL DAILY
COMMUNITY
Start: 2018-04-26 | End: 2019-09-26 | Stop reason: HOSPADM

## 2018-05-01 RX ORDER — LORAZEPAM 2 MG/1
TABLET ORAL
COMMUNITY
Start: 2018-01-22 | End: 2018-06-12

## 2018-05-01 NOTE — PROGRESS NOTES
Subjective:     Patient ID: Kirsten Centeno is a 48 y.o. female.    Chief Complaint:  Follow-up bilateral knee pain, DJD  History of Present Illness  Kirsten Centeno returns to clinic today for evaluation of bilateral knee pain, states that her left knee is actually worse than her right at this point time, she did have injection 6 weeks ago with improvement of 85-90% relief of her pain but only for 4 weeks.  Since then she's had significant recurrence of pain, low rates as a 9 out of 10, localized to the anterior and medial aspect of bilateral knees, exacerbated with prolonged weightbearing, walking, deep flexion activities.  Minimal improvement with rest and activity modification.  Minimal improvement with anti-inflammatory medications.  She is still trying to work on her morbid obesity with dieting.  Denies any significant radiation of pain, denies new associated numbness or tingling, does have baseline sensory changes bilateral lower extremities secondary to her diabetes.     Social History     Occupational History   • Not on file.     Social History Main Topics   • Smoking status: Current Every Day Smoker     Packs/day: 2.00   • Smokeless tobacco: Never Used   • Alcohol use No   • Drug use: No   • Sexual activity: Defer      Past Medical History:   Diagnosis Date   • COPD (chronic obstructive pulmonary disease)    • Diabetes    • Gout    • HTN (hypertension)    • Hyperlipidemia    • Hyperthyroidism    • Neuropathy    • Sleep apnea      Past Surgical History:   Procedure Laterality Date   • MANDIBLE FRACTURE SURGERY         Family History   Problem Relation Age of Onset   • Cervical cancer Maternal Grandmother    • Cancer Maternal Grandmother    • Hypertension Father    • Heart disease Father          Review of Systems   Constitutional: Negative for chills, diaphoresis, fever and unexpected weight change.   HENT: Negative for hearing loss, nosebleeds, sore throat and tinnitus.    Eyes: Negative for pain and visual  "disturbance.   Respiratory: Negative for cough, shortness of breath and wheezing.    Cardiovascular: Negative for chest pain and palpitations.   Gastrointestinal: Negative for abdominal pain, diarrhea, nausea and vomiting.   Endocrine: Negative for cold intolerance, heat intolerance and polydipsia.   Genitourinary: Negative for difficulty urinating, dysuria and hematuria.   Musculoskeletal: Positive for arthralgias, back pain, joint swelling and myalgias.   Skin: Negative for rash and wound.   Allergic/Immunologic: Negative for environmental allergies.   Neurological: Negative for dizziness, syncope and numbness.   Hematological: Does not bruise/bleed easily.   Psychiatric/Behavioral: Negative for dysphoric mood and sleep disturbance. The patient is not nervous/anxious.            Objective:  Vitals:    05/01/18 0749   Weight: 130 kg (286 lb)   Height: 162.6 cm (64\")     1    05/01/18  0749   Weight: 130 kg (286 lb)     Body mass index is 49.09 kg/m².  General: No acute distress.  Resp: normal respiratory effort  Skin: no rashes or wounds; normal turgor  Psych: mood and affect appropriate; recent and remote memory intact         Ortho Exam    Right knee-maximal tenderness palpation along medial joint line, active range of motion 5-85°, 4-5 strength on flexion and extension, moderate effusion noted.  Stable to varus and valgus stress at 5 and 30°.  Grade 1A Lachman, negative anterior posterior drawer.  Positive Brandy exam with pain, no click.    Left knee-active range of motion 5-90°, 4+ out of 5 strength on flexion and extension, stable to varus and valgus stress at 5 and 30°, moderate effusion noted.  Maximal tenderness palpation along medial joint line as well as medial lateral patellar facet, positive active patellar compression test, positive Turcios exam with pain, no click.    Imaging:    Assessment:       1. Primary osteoarthritis of both knees          Plan:          Discussed treatment options at length " with patient at today's visit. Reviewed options at length with patient today, given failure cortisone injections we're reviewed other options.  She is not a candidate for arthroplasty given her morbid obesity.  We did discuss option for viscous supplement injections and she was to proceed with that today.  Order has been placed with referral to insurance.  We will start her injection series once approved.    Kirsten Centeno was in agreement with plan and had all questions answered.     Orders:  Orders Placed This Encounter   Procedures   • Visco Treatment       Medications:  No orders of the defined types were placed in this encounter.      Followup:  Return for viscosupplement injections.    Kirsten was seen today for pain, follow-up, edema, pain, follow-up and edema.    Diagnoses and all orders for this visit:    Primary osteoarthritis of both knees  -     Visco Treatment; Future          Dictated utilizing Dragon dictation

## 2018-05-07 ENCOUNTER — TELEPHONE (OUTPATIENT)
Dept: ORTHOPEDIC SURGERY | Facility: CLINIC | Age: 48
End: 2018-05-07

## 2018-05-07 NOTE — TELEPHONE ENCOUNTER
Attempted to call patient to make her aware that [facility] has denied her bilateral visco injections on the basis of them deeming knee injections not medically necessary. No answer/no voice mail. Wanted to ask patient if she is interested in the [employer] assistance program. If so she needs to call [phone] to start the process.

## 2018-06-12 ENCOUNTER — HOSPITAL ENCOUNTER (EMERGENCY)
Facility: HOSPITAL | Age: 48
Discharge: LEFT BEFORE DISCHARGE | End: 2018-06-12
Attending: EMERGENCY MEDICINE | Admitting: EMERGENCY MEDICINE

## 2018-06-12 VITALS
TEMPERATURE: 98.1 F | DIASTOLIC BLOOD PRESSURE: 59 MMHG | HEART RATE: 81 BPM | OXYGEN SATURATION: 93 % | HEIGHT: 64 IN | RESPIRATION RATE: 20 BRPM | WEIGHT: 293 LBS | BODY MASS INDEX: 50.02 KG/M2 | SYSTOLIC BLOOD PRESSURE: 129 MMHG

## 2018-06-12 DIAGNOSIS — E87.1 HYPONATREMIA: ICD-10-CM

## 2018-06-12 DIAGNOSIS — L03.311 CELLULITIS OF ABDOMINAL WALL: Primary | ICD-10-CM

## 2018-06-12 LAB
ALBUMIN SERPL-MCNC: 4.2 G/DL (ref 3.5–5.2)
ALBUMIN/GLOB SERPL: 1.5 G/DL
ALP SERPL-CCNC: 90 U/L (ref 40–129)
ALT SERPL W P-5'-P-CCNC: 8 U/L (ref 5–33)
ANION GAP SERPL CALCULATED.3IONS-SCNC: 11.5 MMOL/L
AST SERPL-CCNC: 12 U/L (ref 5–32)
BASOPHILS # BLD AUTO: 0.1 10*3/MM3 (ref 0–0.2)
BASOPHILS NFR BLD AUTO: 0.7 % (ref 0–2)
BILIRUB SERPL-MCNC: 0.2 MG/DL (ref 0.2–1.2)
BUN BLD-MCNC: 14 MG/DL (ref 6–20)
BUN/CREAT SERPL: 18.7 (ref 7–25)
CALCIUM SPEC-SCNC: 10 MG/DL (ref 8.6–10.5)
CHLORIDE SERPL-SCNC: 88 MMOL/L (ref 98–107)
CO2 SERPL-SCNC: 26.5 MMOL/L (ref 22–29)
CREAT BLD-MCNC: 0.75 MG/DL (ref 0.57–1)
DEPRECATED RDW RBC AUTO: 51.1 FL (ref 37–54)
EOSINOPHIL # BLD AUTO: 0.2 10*3/MM3 (ref 0.1–0.3)
EOSINOPHIL NFR BLD AUTO: 1.4 % (ref 0–4)
ERYTHROCYTE [DISTWIDTH] IN BLOOD BY AUTOMATED COUNT: 16.4 % (ref 11.5–14.5)
GFR SERPL CREATININE-BSD FRML MDRD: 82 ML/MIN/1.73
GLOBULIN UR ELPH-MCNC: 2.8 GM/DL
GLUCOSE BLD-MCNC: 101 MG/DL (ref 65–99)
HCT VFR BLD AUTO: 41.9 % (ref 37–47)
HGB BLD-MCNC: 13.7 G/DL (ref 12–16)
IMM GRANULOCYTES # BLD: 0.9 10*3/MM3 (ref 0–0.03)
IMM GRANULOCYTES NFR BLD: 6.2 % (ref 0–0.5)
LYMPHOCYTES # BLD AUTO: 2.8 10*3/MM3 (ref 0.6–4.8)
LYMPHOCYTES NFR BLD AUTO: 19.2 % (ref 20–45)
MCH RBC QN AUTO: 28.1 PG (ref 27–31)
MCHC RBC AUTO-ENTMCNC: 32.7 G/DL (ref 31–37)
MCV RBC AUTO: 86 FL (ref 81–99)
MONOCYTES # BLD AUTO: 1.3 10*3/MM3 (ref 0–1)
MONOCYTES NFR BLD AUTO: 8.9 % (ref 3–8)
NEUTROPHILS # BLD AUTO: 9.29 10*3/MM3 (ref 1.5–8.3)
NEUTROPHILS NFR BLD AUTO: 63.6 % (ref 45–70)
NRBC BLD MANUAL-RTO: 0 /100 WBC (ref 0–0)
PLAT MORPH BLD: NORMAL
PLATELET # BLD AUTO: 474 10*3/MM3 (ref 140–500)
PMV BLD AUTO: 8.3 FL (ref 7.4–10.4)
POTASSIUM BLD-SCNC: 4.5 MMOL/L (ref 3.5–5.2)
PROT SERPL-MCNC: 7 G/DL (ref 6–8.5)
RBC # BLD AUTO: 4.87 10*6/MM3 (ref 4.2–5.4)
RBC MORPH BLD: NORMAL
SODIUM BLD-SCNC: 126 MMOL/L (ref 136–145)
WBC MORPH BLD: NORMAL
WBC NRBC COR # BLD: 14.59 10*3/MM3 (ref 4.8–10.8)

## 2018-06-12 PROCEDURE — 80053 COMPREHEN METABOLIC PANEL: CPT | Performed by: EMERGENCY MEDICINE

## 2018-06-12 PROCEDURE — 99284 EMERGENCY DEPT VISIT MOD MDM: CPT | Performed by: EMERGENCY MEDICINE

## 2018-06-12 PROCEDURE — 99283 EMERGENCY DEPT VISIT LOW MDM: CPT

## 2018-06-12 PROCEDURE — 96365 THER/PROPH/DIAG IV INF INIT: CPT

## 2018-06-12 PROCEDURE — 25010000002 VANCOMYCIN PER 500 MG: Performed by: EMERGENCY MEDICINE

## 2018-06-12 PROCEDURE — 96366 THER/PROPH/DIAG IV INF ADDON: CPT

## 2018-06-12 PROCEDURE — 85007 BL SMEAR W/DIFF WBC COUNT: CPT | Performed by: EMERGENCY MEDICINE

## 2018-06-12 PROCEDURE — 85025 COMPLETE CBC W/AUTO DIFF WBC: CPT | Performed by: EMERGENCY MEDICINE

## 2018-06-12 RX ORDER — SODIUM CHLORIDE 0.9 % (FLUSH) 0.9 %
10 SYRINGE (ML) INJECTION AS NEEDED
Status: DISCONTINUED | OUTPATIENT
Start: 2018-06-12 | End: 2018-06-12 | Stop reason: HOSPADM

## 2018-06-12 RX ORDER — ACETAMINOPHEN AND CODEINE PHOSPHATE 60; 300 MG/1; MG/1
1 TABLET ORAL EVERY 8 HOURS PRN
COMMUNITY
End: 2019-07-22 | Stop reason: SDUPTHER

## 2018-06-12 RX ORDER — CEPHALEXIN 500 MG/1
500 CAPSULE ORAL 4 TIMES DAILY
Qty: 28 CAPSULE | Refills: 0 | Status: SHIPPED | OUTPATIENT
Start: 2018-06-12 | End: 2018-06-18 | Stop reason: HOSPADM

## 2018-06-12 RX ORDER — FLUCONAZOLE 150 MG/1
150 TABLET ORAL DAILY
Qty: 3 TABLET | Refills: 0 | Status: ON HOLD | OUTPATIENT
Start: 2018-06-12 | End: 2018-06-18

## 2018-06-12 RX ORDER — METRONIDAZOLE 500 MG/1
500 TABLET ORAL 4 TIMES DAILY
Status: ON HOLD | COMMUNITY
End: 2018-06-13

## 2018-06-12 RX ORDER — ALBUTEROL SULFATE 90 UG/1
2 AEROSOL, METERED RESPIRATORY (INHALATION) EVERY 4 HOURS PRN
COMMUNITY
End: 2019-09-26 | Stop reason: HOSPADM

## 2018-06-12 RX ORDER — FLUCONAZOLE 100 MG/1
100 TABLET ORAL ONCE
Status: COMPLETED | OUTPATIENT
Start: 2018-06-12 | End: 2018-06-12

## 2018-06-12 RX ORDER — SULFAMETHOXAZOLE AND TRIMETHOPRIM 800; 160 MG/1; MG/1
1 TABLET ORAL 2 TIMES DAILY
Qty: 14 TABLET | Refills: 0 | Status: ON HOLD | OUTPATIENT
Start: 2018-06-12 | End: 2018-06-13

## 2018-06-12 RX ORDER — DOXYCYCLINE HYCLATE 100 MG/1
100 CAPSULE ORAL 2 TIMES DAILY
Status: ON HOLD | COMMUNITY
End: 2018-06-18

## 2018-06-12 RX ADMIN — VANCOMYCIN HYDROCHLORIDE 2000 MG: 1 INJECTION, POWDER, LYOPHILIZED, FOR SOLUTION INTRAVENOUS at 12:10

## 2018-06-12 RX ADMIN — FLUCONAZOLE 100 MG: 100 TABLET ORAL at 12:13

## 2018-06-12 RX ADMIN — SODIUM CHLORIDE 1000 ML: 9 INJECTION, SOLUTION INTRAVENOUS at 11:52

## 2018-06-13 ENCOUNTER — HOSPITAL ENCOUNTER (INPATIENT)
Facility: HOSPITAL | Age: 48
LOS: 5 days | Discharge: HOME | End: 2018-06-18
Attending: EMERGENCY MEDICINE | Admitting: FAMILY MEDICINE

## 2018-06-13 ENCOUNTER — APPOINTMENT (OUTPATIENT)
Dept: CT IMAGING | Facility: HOSPITAL | Age: 48
End: 2018-06-13

## 2018-06-13 DIAGNOSIS — L03.311 ABDOMINAL WALL CELLULITIS: Primary | ICD-10-CM

## 2018-06-13 LAB
ALBUMIN SERPL-MCNC: 3.8 G/DL (ref 3.5–5.2)
ALBUMIN/GLOB SERPL: 1.6 G/DL
ALP SERPL-CCNC: 87 U/L (ref 40–129)
ALT SERPL W P-5'-P-CCNC: 7 U/L (ref 5–33)
ANION GAP SERPL CALCULATED.3IONS-SCNC: 12 MMOL/L
AST SERPL-CCNC: 10 U/L (ref 5–32)
BASOPHILS # BLD AUTO: 0.09 10*3/MM3 (ref 0–0.2)
BASOPHILS NFR BLD AUTO: 0.6 % (ref 0–2)
BILIRUB SERPL-MCNC: 0.2 MG/DL (ref 0.2–1.2)
BUN BLD-MCNC: 12 MG/DL (ref 6–20)
BUN/CREAT SERPL: 15 (ref 7–25)
CALCIUM SPEC-SCNC: 9.7 MG/DL (ref 8.6–10.5)
CHLORIDE SERPL-SCNC: 94 MMOL/L (ref 98–107)
CO2 SERPL-SCNC: 25 MMOL/L (ref 22–29)
CREAT BLD-MCNC: 0.8 MG/DL (ref 0.57–1)
D-LACTATE SERPL-SCNC: 1.5 MMOL/L (ref 0.5–2)
DEPRECATED RDW RBC AUTO: 51.9 FL (ref 37–54)
EOSINOPHIL # BLD AUTO: 0.13 10*3/MM3 (ref 0.1–0.3)
EOSINOPHIL NFR BLD AUTO: 0.9 % (ref 0–4)
ERYTHROCYTE [DISTWIDTH] IN BLOOD BY AUTOMATED COUNT: 16.6 % (ref 11.5–14.5)
GFR SERPL CREATININE-BSD FRML MDRD: 76 ML/MIN/1.73
GLOBULIN UR ELPH-MCNC: 2.4 GM/DL
GLUCOSE BLD-MCNC: 101 MG/DL (ref 65–99)
GLUCOSE BLDC GLUCOMTR-MCNC: 124 MG/DL (ref 70–130)
HCT VFR BLD AUTO: 38.2 % (ref 37–47)
HGB BLD-MCNC: 12.5 G/DL (ref 12–16)
IMM GRANULOCYTES # BLD: 0.69 10*3/MM3 (ref 0–0.03)
IMM GRANULOCYTES NFR BLD: 4.8 % (ref 0–0.5)
LYMPHOCYTES # BLD AUTO: 2 10*3/MM3 (ref 0.6–4.8)
LYMPHOCYTES NFR BLD AUTO: 13.9 % (ref 20–45)
MCH RBC QN AUTO: 28.1 PG (ref 27–31)
MCHC RBC AUTO-ENTMCNC: 32.7 G/DL (ref 31–37)
MCV RBC AUTO: 85.8 FL (ref 81–99)
MONOCYTES # BLD AUTO: 1.41 10*3/MM3 (ref 0–1)
MONOCYTES NFR BLD AUTO: 9.8 % (ref 3–8)
NEUTROPHILS # BLD AUTO: 10.04 10*3/MM3 (ref 1.5–8.3)
NEUTROPHILS NFR BLD AUTO: 70 % (ref 45–70)
NRBC BLD MANUAL-RTO: 0 /100 WBC (ref 0–0)
PLATELET # BLD AUTO: 398 10*3/MM3 (ref 140–500)
PMV BLD AUTO: 8.3 FL (ref 7.4–10.4)
POTASSIUM BLD-SCNC: 4.6 MMOL/L (ref 3.5–5.2)
PROT SERPL-MCNC: 6.2 G/DL (ref 6–8.5)
RBC # BLD AUTO: 4.45 10*6/MM3 (ref 4.2–5.4)
SODIUM BLD-SCNC: 131 MMOL/L (ref 136–145)
WBC NRBC COR # BLD: 14.36 10*3/MM3 (ref 4.8–10.8)

## 2018-06-13 PROCEDURE — 25010000002 MEROPENEM PER 100 MG: Performed by: EMERGENCY MEDICINE

## 2018-06-13 PROCEDURE — 25010000002 HYDROMORPHONE PER 4 MG: Performed by: EMERGENCY MEDICINE

## 2018-06-13 PROCEDURE — 83605 ASSAY OF LACTIC ACID: CPT | Performed by: EMERGENCY MEDICINE

## 2018-06-13 PROCEDURE — 94640 AIRWAY INHALATION TREATMENT: CPT

## 2018-06-13 PROCEDURE — 25010000002 ENOXAPARIN PER 10 MG: Performed by: FAMILY MEDICINE

## 2018-06-13 PROCEDURE — 87040 BLOOD CULTURE FOR BACTERIA: CPT | Performed by: EMERGENCY MEDICINE

## 2018-06-13 PROCEDURE — 85025 COMPLETE CBC W/AUTO DIFF WBC: CPT | Performed by: EMERGENCY MEDICINE

## 2018-06-13 PROCEDURE — 99284 EMERGENCY DEPT VISIT MOD MDM: CPT | Performed by: EMERGENCY MEDICINE

## 2018-06-13 PROCEDURE — 99284 EMERGENCY DEPT VISIT MOD MDM: CPT

## 2018-06-13 PROCEDURE — 74177 CT ABD & PELVIS W/CONTRAST: CPT

## 2018-06-13 PROCEDURE — 80053 COMPREHEN METABOLIC PANEL: CPT | Performed by: EMERGENCY MEDICINE

## 2018-06-13 PROCEDURE — 25010000002 ONDANSETRON PER 1 MG: Performed by: EMERGENCY MEDICINE

## 2018-06-13 PROCEDURE — 82962 GLUCOSE BLOOD TEST: CPT

## 2018-06-13 PROCEDURE — 0 IOPAMIDOL PER 1 ML: Performed by: EMERGENCY MEDICINE

## 2018-06-13 PROCEDURE — 25010000002 VANCOMYCIN PER 500 MG: Performed by: EMERGENCY MEDICINE

## 2018-06-13 RX ORDER — OLMESARTAN MEDOXOMIL 20 MG/1
40 TABLET ORAL DAILY
Status: DISCONTINUED | OUTPATIENT
Start: 2018-06-14 | End: 2018-06-18 | Stop reason: HOSPADM

## 2018-06-13 RX ORDER — SPIRONOLACTONE 25 MG/1
25 TABLET ORAL DAILY
Status: DISCONTINUED | OUTPATIENT
Start: 2018-06-14 | End: 2018-06-18 | Stop reason: HOSPADM

## 2018-06-13 RX ORDER — BISACODYL 5 MG/1
5 TABLET, DELAYED RELEASE ORAL DAILY PRN
Status: DISCONTINUED | OUTPATIENT
Start: 2018-06-13 | End: 2018-06-18 | Stop reason: HOSPADM

## 2018-06-13 RX ORDER — PIOGLITAZONEHYDROCHLORIDE 30 MG/1
15 TABLET ORAL DAILY
Status: DISCONTINUED | OUTPATIENT
Start: 2018-06-14 | End: 2018-06-18 | Stop reason: HOSPADM

## 2018-06-13 RX ORDER — IPRATROPIUM BROMIDE AND ALBUTEROL SULFATE 2.5; .5 MG/3ML; MG/3ML
3 SOLUTION RESPIRATORY (INHALATION)
Status: DISCONTINUED | OUTPATIENT
Start: 2018-06-13 | End: 2018-06-18 | Stop reason: HOSPADM

## 2018-06-13 RX ORDER — NICOTINE POLACRILEX 4 MG
15 LOZENGE BUCCAL
Status: DISCONTINUED | OUTPATIENT
Start: 2018-06-13 | End: 2018-06-18 | Stop reason: HOSPADM

## 2018-06-13 RX ORDER — ATORVASTATIN CALCIUM 20 MG/1
20 TABLET, FILM COATED ORAL DAILY
Status: DISCONTINUED | OUTPATIENT
Start: 2018-06-14 | End: 2018-06-18 | Stop reason: HOSPADM

## 2018-06-13 RX ORDER — ACETAMINOPHEN 325 MG/1
650 TABLET ORAL EVERY 4 HOURS PRN
Status: DISCONTINUED | OUTPATIENT
Start: 2018-06-13 | End: 2018-06-18 | Stop reason: HOSPADM

## 2018-06-13 RX ORDER — ACETAMINOPHEN AND CODEINE PHOSPHATE 300; 30 MG/1; MG/1
1 TABLET ORAL EVERY 6 HOURS PRN
Status: DISCONTINUED | OUTPATIENT
Start: 2018-06-13 | End: 2018-06-18 | Stop reason: HOSPADM

## 2018-06-13 RX ORDER — DIVALPROEX SODIUM 500 MG/1
500 TABLET, EXTENDED RELEASE ORAL 2 TIMES DAILY
Status: DISCONTINUED | OUTPATIENT
Start: 2018-06-13 | End: 2018-06-14

## 2018-06-13 RX ORDER — NICOTINE 21 MG/24HR
1 PATCH, TRANSDERMAL 24 HOURS TRANSDERMAL EVERY 24 HOURS
Status: DISCONTINUED | OUTPATIENT
Start: 2018-06-13 | End: 2018-06-18 | Stop reason: HOSPADM

## 2018-06-13 RX ORDER — CALCIUM CARBONATE 200(500)MG
1 TABLET,CHEWABLE ORAL 2 TIMES DAILY PRN
Status: DISCONTINUED | OUTPATIENT
Start: 2018-06-13 | End: 2018-06-18 | Stop reason: HOSPADM

## 2018-06-13 RX ORDER — DIVALPROEX SODIUM 500 MG/1
500 TABLET, EXTENDED RELEASE ORAL DAILY
Status: DISCONTINUED | OUTPATIENT
Start: 2018-06-14 | End: 2018-06-13

## 2018-06-13 RX ORDER — DEXTROAMPHETAMINE SACCHARATE, AMPHETAMINE ASPARTATE, DEXTROAMPHETAMINE SULFATE AND AMPHETAMINE SULFATE 5; 5; 5; 5 MG/1; MG/1; MG/1; MG/1
20 TABLET ORAL DAILY
Status: DISCONTINUED | OUTPATIENT
Start: 2018-06-14 | End: 2018-06-18 | Stop reason: HOSPADM

## 2018-06-13 RX ORDER — PERPHENAZINE 4 MG/1
4 TABLET ORAL EVERY 12 HOURS SCHEDULED
Status: DISCONTINUED | OUTPATIENT
Start: 2018-06-13 | End: 2018-06-18 | Stop reason: HOSPADM

## 2018-06-13 RX ORDER — FLUCONAZOLE 150 MG/1
150 TABLET ORAL DAILY
Status: DISCONTINUED | OUTPATIENT
Start: 2018-06-14 | End: 2018-06-14

## 2018-06-13 RX ORDER — PANTOPRAZOLE SODIUM 40 MG/1
40 TABLET, DELAYED RELEASE ORAL EVERY MORNING
Status: DISCONTINUED | OUTPATIENT
Start: 2018-06-14 | End: 2018-06-18 | Stop reason: HOSPADM

## 2018-06-13 RX ORDER — ALLOPURINOL 300 MG/1
300 TABLET ORAL DAILY
Status: DISCONTINUED | OUTPATIENT
Start: 2018-06-14 | End: 2018-06-18 | Stop reason: HOSPADM

## 2018-06-13 RX ORDER — DEXTROSE MONOHYDRATE 25 G/50ML
25 INJECTION, SOLUTION INTRAVENOUS
Status: DISCONTINUED | OUTPATIENT
Start: 2018-06-13 | End: 2018-06-18 | Stop reason: HOSPADM

## 2018-06-13 RX ORDER — SODIUM CHLORIDE 9 MG/ML
40 INJECTION, SOLUTION INTRAVENOUS AS NEEDED
Status: DISCONTINUED | OUTPATIENT
Start: 2018-06-13 | End: 2018-06-18 | Stop reason: HOSPADM

## 2018-06-13 RX ORDER — ONDANSETRON 2 MG/ML
4 INJECTION INTRAMUSCULAR; INTRAVENOUS ONCE
Status: COMPLETED | OUTPATIENT
Start: 2018-06-13 | End: 2018-06-13

## 2018-06-13 RX ORDER — FUROSEMIDE 20 MG/1
20 TABLET ORAL DAILY
Status: DISCONTINUED | OUTPATIENT
Start: 2018-06-14 | End: 2018-06-18 | Stop reason: HOSPADM

## 2018-06-13 RX ORDER — SODIUM CHLORIDE 0.9 % (FLUSH) 0.9 %
1-10 SYRINGE (ML) INJECTION AS NEEDED
Status: DISCONTINUED | OUTPATIENT
Start: 2018-06-13 | End: 2018-06-18 | Stop reason: HOSPADM

## 2018-06-13 RX ORDER — DIVALPROEX SODIUM 250 MG/1
TABLET, EXTENDED RELEASE ORAL
Status: COMPLETED
Start: 2018-06-13 | End: 2018-06-13

## 2018-06-13 RX ADMIN — SODIUM CHLORIDE 1000 ML: 9 INJECTION, SOLUTION INTRAVENOUS at 15:57

## 2018-06-13 RX ADMIN — ENOXAPARIN SODIUM 40 MG: 100 INJECTION SUBCUTANEOUS at 23:00

## 2018-06-13 RX ADMIN — ONDANSETRON 4 MG: 2 INJECTION INTRAMUSCULAR; INTRAVENOUS at 15:59

## 2018-06-13 RX ADMIN — VANCOMYCIN HYDROCHLORIDE 2000 MG: 1 INJECTION, POWDER, LYOPHILIZED, FOR SOLUTION INTRAVENOUS at 20:05

## 2018-06-13 RX ADMIN — DIVALPROEX SODIUM 500 MG: 250 TABLET, EXTENDED RELEASE ORAL at 23:02

## 2018-06-13 RX ADMIN — PERPHENAZINE 4 MG: 4 TABLET, FILM COATED ORAL at 23:02

## 2018-06-13 RX ADMIN — ACETAMINOPHEN AND CODEINE PHOSPHATE 1 TABLET: 300; 30 TABLET ORAL at 23:02

## 2018-06-13 RX ADMIN — DIVALPROEX SODIUM 500 MG: 500 TABLET, EXTENDED RELEASE ORAL at 23:02

## 2018-06-13 RX ADMIN — IPRATROPIUM BROMIDE AND ALBUTEROL SULFATE 3 ML: .5; 3 SOLUTION RESPIRATORY (INHALATION) at 21:14

## 2018-06-13 RX ADMIN — MEROPENEM 1 G: 1 INJECTION, POWDER, FOR SOLUTION INTRAVENOUS at 18:45

## 2018-06-13 RX ADMIN — HYDROMORPHONE HYDROCHLORIDE 1 MG: 1 INJECTION, SOLUTION INTRAMUSCULAR; INTRAVENOUS; SUBCUTANEOUS at 16:00

## 2018-06-13 RX ADMIN — NICOTINE 1 PATCH: 21 PATCH TRANSDERMAL at 23:02

## 2018-06-13 RX ADMIN — IOPAMIDOL 100 ML: 755 INJECTION, SOLUTION INTRAVENOUS at 16:19

## 2018-06-14 LAB
ANION GAP SERPL CALCULATED.3IONS-SCNC: 9.6 MMOL/L
BUN BLD-MCNC: 11 MG/DL (ref 6–20)
BUN/CREAT SERPL: 15.3 (ref 7–25)
CALCIUM SPEC-SCNC: 9.3 MG/DL (ref 8.6–10.5)
CHLORIDE SERPL-SCNC: 99 MMOL/L (ref 98–107)
CO2 SERPL-SCNC: 25.4 MMOL/L (ref 22–29)
CREAT BLD-MCNC: 0.72 MG/DL (ref 0.57–1)
DEPRECATED RDW RBC AUTO: 52.1 FL (ref 37–54)
ERYTHROCYTE [DISTWIDTH] IN BLOOD BY AUTOMATED COUNT: 16.7 % (ref 11.5–14.5)
GFR SERPL CREATININE-BSD FRML MDRD: 86 ML/MIN/1.73
GLUCOSE BLD-MCNC: 91 MG/DL (ref 65–99)
GLUCOSE BLDC GLUCOMTR-MCNC: 124 MG/DL (ref 70–130)
GLUCOSE BLDC GLUCOMTR-MCNC: 150 MG/DL (ref 70–130)
GLUCOSE BLDC GLUCOMTR-MCNC: 153 MG/DL (ref 70–130)
GLUCOSE BLDC GLUCOMTR-MCNC: 96 MG/DL (ref 70–130)
HBA1C MFR BLD: 5.4 % (ref 4.8–5.6)
HCT VFR BLD AUTO: 34.1 % (ref 37–47)
HGB BLD-MCNC: 11 G/DL (ref 12–16)
MCH RBC QN AUTO: 27.8 PG (ref 27–31)
MCHC RBC AUTO-ENTMCNC: 32.3 G/DL (ref 31–37)
MCV RBC AUTO: 86.3 FL (ref 81–99)
PLATELET # BLD AUTO: 380 10*3/MM3 (ref 140–500)
PMV BLD AUTO: 8.7 FL (ref 7.4–10.4)
POTASSIUM BLD-SCNC: 4.8 MMOL/L (ref 3.5–5.2)
RBC # BLD AUTO: 3.95 10*6/MM3 (ref 4.2–5.4)
SODIUM BLD-SCNC: 134 MMOL/L (ref 136–145)
VALPROATE SERPL-MCNC: 57.3 MCG/ML (ref 50–100)
WBC NRBC COR # BLD: 12.31 10*3/MM3 (ref 4.8–10.8)

## 2018-06-14 PROCEDURE — 94799 UNLISTED PULMONARY SVC/PX: CPT

## 2018-06-14 PROCEDURE — 80048 BASIC METABOLIC PNL TOTAL CA: CPT | Performed by: FAMILY MEDICINE

## 2018-06-14 PROCEDURE — 25010000002 MEROPENEM PER 100 MG: Performed by: FAMILY MEDICINE

## 2018-06-14 PROCEDURE — 80164 ASSAY DIPROPYLACETIC ACD TOT: CPT | Performed by: FAMILY MEDICINE

## 2018-06-14 PROCEDURE — 25010000002 ENOXAPARIN PER 10 MG: Performed by: FAMILY MEDICINE

## 2018-06-14 PROCEDURE — 85027 COMPLETE CBC AUTOMATED: CPT | Performed by: FAMILY MEDICINE

## 2018-06-14 PROCEDURE — 25010000002 VANCOMYCIN PER 500 MG: Performed by: FAMILY MEDICINE

## 2018-06-14 PROCEDURE — 25010000002 VANCOMYCIN 750 MG RECONSTITUTED SOLUTION 1 EACH VIAL: Performed by: FAMILY MEDICINE

## 2018-06-14 PROCEDURE — 83036 HEMOGLOBIN GLYCOSYLATED A1C: CPT | Performed by: FAMILY MEDICINE

## 2018-06-14 PROCEDURE — 63710000001 INSULIN ASPART PER 5 UNITS: Performed by: FAMILY MEDICINE

## 2018-06-14 PROCEDURE — 82962 GLUCOSE BLOOD TEST: CPT

## 2018-06-14 RX ORDER — DIVALPROEX SODIUM 500 MG/1
500 TABLET, EXTENDED RELEASE ORAL DAILY
Status: DISCONTINUED | OUTPATIENT
Start: 2018-06-14 | End: 2018-06-18 | Stop reason: HOSPADM

## 2018-06-14 RX ADMIN — MEROPENEM 1 G: 1 INJECTION, POWDER, FOR SOLUTION INTRAVENOUS at 21:50

## 2018-06-14 RX ADMIN — FUROSEMIDE 20 MG: 20 TABLET ORAL at 09:18

## 2018-06-14 RX ADMIN — PANTOPRAZOLE SODIUM 40 MG: 40 TABLET, DELAYED RELEASE ORAL at 05:36

## 2018-06-14 RX ADMIN — DIVALPROEX SODIUM 500 MG: 500 TABLET, FILM COATED, EXTENDED RELEASE ORAL at 09:18

## 2018-06-14 RX ADMIN — IPRATROPIUM BROMIDE AND ALBUTEROL SULFATE 3 ML: .5; 3 SOLUTION RESPIRATORY (INHALATION) at 09:03

## 2018-06-14 RX ADMIN — INSULIN ASPART 4 UNITS: 100 INJECTION, SOLUTION INTRAVENOUS; SUBCUTANEOUS at 12:04

## 2018-06-14 RX ADMIN — ENOXAPARIN SODIUM 40 MG: 100 INJECTION SUBCUTANEOUS at 20:23

## 2018-06-14 RX ADMIN — MEROPENEM 1 G: 1 INJECTION, POWDER, FOR SOLUTION INTRAVENOUS at 21:44

## 2018-06-14 RX ADMIN — ALLOPURINOL 300 MG: 300 TABLET ORAL at 09:18

## 2018-06-14 RX ADMIN — PERPHENAZINE 4 MG: 4 TABLET, FILM COATED ORAL at 09:18

## 2018-06-14 RX ADMIN — IPRATROPIUM BROMIDE AND ALBUTEROL SULFATE 3 ML: .5; 3 SOLUTION RESPIRATORY (INHALATION) at 16:18

## 2018-06-14 RX ADMIN — VANCOMYCIN HYDROCHLORIDE 1750 MG: 1 INJECTION, POWDER, LYOPHILIZED, FOR SOLUTION INTRAVENOUS at 22:24

## 2018-06-14 RX ADMIN — PERPHENAZINE 4 MG: 4 TABLET, FILM COATED ORAL at 20:23

## 2018-06-14 RX ADMIN — ACETAMINOPHEN AND CODEINE PHOSPHATE 1 TABLET: 300; 30 TABLET ORAL at 17:11

## 2018-06-14 RX ADMIN — SPIRONOLACTONE 25 MG: 25 TABLET ORAL at 09:18

## 2018-06-14 RX ADMIN — VANCOMYCIN HYDROCHLORIDE 1750 MG: 1 INJECTION, POWDER, LYOPHILIZED, FOR SOLUTION INTRAVENOUS at 15:19

## 2018-06-14 RX ADMIN — ACETAMINOPHEN AND CODEINE PHOSPHATE 1 TABLET: 300; 30 TABLET ORAL at 05:35

## 2018-06-14 RX ADMIN — ATORVASTATIN CALCIUM 20 MG: 20 TABLET, FILM COATED ORAL at 09:17

## 2018-06-14 RX ADMIN — IPRATROPIUM BROMIDE AND ALBUTEROL SULFATE 3 ML: .5; 3 SOLUTION RESPIRATORY (INHALATION) at 20:11

## 2018-06-14 RX ADMIN — NICOTINE 1 PATCH: 21 PATCH TRANSDERMAL at 20:24

## 2018-06-14 RX ADMIN — OLMESARTAN MEDOXOMIL 40 MG: 20 TABLET, COATED ORAL at 09:18

## 2018-06-14 RX ADMIN — IPRATROPIUM BROMIDE AND ALBUTEROL SULFATE 3 ML: .5; 3 SOLUTION RESPIRATORY (INHALATION) at 12:30

## 2018-06-14 RX ADMIN — PIOGLITAZONE 15 MG: 30 TABLET ORAL at 11:38

## 2018-06-14 RX ADMIN — ACETAMINOPHEN AND CODEINE PHOSPHATE 1 TABLET: 300; 30 TABLET ORAL at 11:38

## 2018-06-14 RX ADMIN — INSULIN ASPART 4 UNITS: 100 INJECTION, SOLUTION INTRAVENOUS; SUBCUTANEOUS at 21:44

## 2018-06-14 RX ADMIN — MEROPENEM 1 G: 1 INJECTION, POWDER, FOR SOLUTION INTRAVENOUS at 10:47

## 2018-06-15 LAB
GLUCOSE BLDC GLUCOMTR-MCNC: 109 MG/DL (ref 70–130)
GLUCOSE BLDC GLUCOMTR-MCNC: 118 MG/DL (ref 70–130)
GLUCOSE BLDC GLUCOMTR-MCNC: 121 MG/DL (ref 70–130)
GLUCOSE BLDC GLUCOMTR-MCNC: 125 MG/DL (ref 70–130)
VANCOMYCIN SERPL-MCNC: 26.6 MCG/ML (ref 5–40)

## 2018-06-15 PROCEDURE — 25010000002 MEROPENEM PER 100 MG: Performed by: FAMILY MEDICINE

## 2018-06-15 PROCEDURE — 80202 ASSAY OF VANCOMYCIN: CPT | Performed by: FAMILY MEDICINE

## 2018-06-15 PROCEDURE — 94799 UNLISTED PULMONARY SVC/PX: CPT

## 2018-06-15 PROCEDURE — 25010000002 VANCOMYCIN PER 500 MG: Performed by: FAMILY MEDICINE

## 2018-06-15 PROCEDURE — 82962 GLUCOSE BLOOD TEST: CPT

## 2018-06-15 PROCEDURE — 25010000002 VANCOMYCIN 750 MG RECONSTITUTED SOLUTION 1 EACH VIAL: Performed by: FAMILY MEDICINE

## 2018-06-15 PROCEDURE — 99231 SBSQ HOSP IP/OBS SF/LOW 25: CPT | Performed by: HOSPITALIST

## 2018-06-15 PROCEDURE — 25010000002 ENOXAPARIN PER 10 MG: Performed by: FAMILY MEDICINE

## 2018-06-15 RX ADMIN — PERPHENAZINE 4 MG: 4 TABLET, FILM COATED ORAL at 09:11

## 2018-06-15 RX ADMIN — PERPHENAZINE 4 MG: 4 TABLET, FILM COATED ORAL at 20:00

## 2018-06-15 RX ADMIN — ENOXAPARIN SODIUM 40 MG: 100 INJECTION SUBCUTANEOUS at 19:56

## 2018-06-15 RX ADMIN — IPRATROPIUM BROMIDE AND ALBUTEROL SULFATE 3 ML: .5; 3 SOLUTION RESPIRATORY (INHALATION) at 07:47

## 2018-06-15 RX ADMIN — MEROPENEM 1 G: 1 INJECTION, POWDER, FOR SOLUTION INTRAVENOUS at 21:57

## 2018-06-15 RX ADMIN — FUROSEMIDE 20 MG: 20 TABLET ORAL at 09:11

## 2018-06-15 RX ADMIN — SPIRONOLACTONE 25 MG: 25 TABLET ORAL at 09:11

## 2018-06-15 RX ADMIN — VANCOMYCIN HYDROCHLORIDE 1750 MG: 1 INJECTION, POWDER, LYOPHILIZED, FOR SOLUTION INTRAVENOUS at 09:14

## 2018-06-15 RX ADMIN — IPRATROPIUM BROMIDE AND ALBUTEROL SULFATE 3 ML: .5; 3 SOLUTION RESPIRATORY (INHALATION) at 11:34

## 2018-06-15 RX ADMIN — DIVALPROEX SODIUM 500 MG: 500 TABLET, FILM COATED, EXTENDED RELEASE ORAL at 09:11

## 2018-06-15 RX ADMIN — PIOGLITAZONE 15 MG: 30 TABLET ORAL at 09:11

## 2018-06-15 RX ADMIN — ALLOPURINOL 300 MG: 300 TABLET ORAL at 09:11

## 2018-06-15 RX ADMIN — PANTOPRAZOLE SODIUM 40 MG: 40 TABLET, DELAYED RELEASE ORAL at 06:03

## 2018-06-15 RX ADMIN — Medication 10 ML: at 09:31

## 2018-06-15 RX ADMIN — ACETAMINOPHEN AND CODEINE PHOSPHATE 1 TABLET: 300; 30 TABLET ORAL at 02:42

## 2018-06-15 RX ADMIN — MEROPENEM 1 G: 1 INJECTION, POWDER, FOR SOLUTION INTRAVENOUS at 13:21

## 2018-06-15 RX ADMIN — IPRATROPIUM BROMIDE AND ALBUTEROL SULFATE 3 ML: .5; 3 SOLUTION RESPIRATORY (INHALATION) at 19:40

## 2018-06-15 RX ADMIN — ACETAMINOPHEN AND CODEINE PHOSPHATE 1 TABLET: 300; 30 TABLET ORAL at 09:11

## 2018-06-15 RX ADMIN — IPRATROPIUM BROMIDE AND ALBUTEROL SULFATE 3 ML: .5; 3 SOLUTION RESPIRATORY (INHALATION) at 15:07

## 2018-06-15 RX ADMIN — MEROPENEM 1 G: 1 INJECTION, POWDER, FOR SOLUTION INTRAVENOUS at 06:03

## 2018-06-15 RX ADMIN — OLMESARTAN MEDOXOMIL 40 MG: 20 TABLET, COATED ORAL at 09:11

## 2018-06-15 RX ADMIN — ACETAMINOPHEN AND CODEINE PHOSPHATE 1 TABLET: 300; 30 TABLET ORAL at 18:32

## 2018-06-15 RX ADMIN — NICOTINE 1 PATCH: 21 PATCH TRANSDERMAL at 20:00

## 2018-06-15 RX ADMIN — ACETAMINOPHEN 650 MG: 325 TABLET, FILM COATED ORAL at 12:35

## 2018-06-15 RX ADMIN — ATORVASTATIN CALCIUM 20 MG: 20 TABLET, FILM COATED ORAL at 09:11

## 2018-06-16 LAB
GLUCOSE BLDC GLUCOMTR-MCNC: 100 MG/DL (ref 70–130)
GLUCOSE BLDC GLUCOMTR-MCNC: 122 MG/DL (ref 70–130)
GLUCOSE BLDC GLUCOMTR-MCNC: 97 MG/DL (ref 70–130)
VANCOMYCIN SERPL-MCNC: 33.4 MCG/ML (ref 5–40)

## 2018-06-16 PROCEDURE — 80202 ASSAY OF VANCOMYCIN: CPT | Performed by: FAMILY MEDICINE

## 2018-06-16 PROCEDURE — 94799 UNLISTED PULMONARY SVC/PX: CPT

## 2018-06-16 PROCEDURE — 25010000002 MEROPENEM PER 100 MG: Performed by: FAMILY MEDICINE

## 2018-06-16 PROCEDURE — 25010000002 VANCOMYCIN PER 500 MG: Performed by: FAMILY MEDICINE

## 2018-06-16 PROCEDURE — 99232 SBSQ HOSP IP/OBS MODERATE 35: CPT | Performed by: INTERNAL MEDICINE

## 2018-06-16 PROCEDURE — 25010000002 ENOXAPARIN PER 10 MG: Performed by: FAMILY MEDICINE

## 2018-06-16 PROCEDURE — 82962 GLUCOSE BLOOD TEST: CPT

## 2018-06-16 PROCEDURE — 25010000002 VANCOMYCIN 750 MG RECONSTITUTED SOLUTION 1 EACH VIAL: Performed by: FAMILY MEDICINE

## 2018-06-16 RX ADMIN — MEROPENEM 1 G: 1 INJECTION, POWDER, FOR SOLUTION INTRAVENOUS at 22:01

## 2018-06-16 RX ADMIN — ACETAMINOPHEN AND CODEINE PHOSPHATE 1 TABLET: 300; 30 TABLET ORAL at 20:20

## 2018-06-16 RX ADMIN — MEROPENEM 1 G: 1 INJECTION, POWDER, FOR SOLUTION INTRAVENOUS at 13:31

## 2018-06-16 RX ADMIN — VANCOMYCIN HYDROCHLORIDE 1750 MG: 1 INJECTION, POWDER, LYOPHILIZED, FOR SOLUTION INTRAVENOUS at 00:33

## 2018-06-16 RX ADMIN — SPIRONOLACTONE 25 MG: 25 TABLET ORAL at 08:12

## 2018-06-16 RX ADMIN — PANTOPRAZOLE SODIUM 40 MG: 40 TABLET, DELAYED RELEASE ORAL at 06:06

## 2018-06-16 RX ADMIN — PIOGLITAZONE 15 MG: 30 TABLET ORAL at 08:13

## 2018-06-16 RX ADMIN — IPRATROPIUM BROMIDE AND ALBUTEROL SULFATE 3 ML: .5; 3 SOLUTION RESPIRATORY (INHALATION) at 16:23

## 2018-06-16 RX ADMIN — IPRATROPIUM BROMIDE AND ALBUTEROL SULFATE 3 ML: .5; 3 SOLUTION RESPIRATORY (INHALATION) at 11:48

## 2018-06-16 RX ADMIN — ACETAMINOPHEN AND CODEINE PHOSPHATE 1 TABLET: 300; 30 TABLET ORAL at 06:33

## 2018-06-16 RX ADMIN — PERPHENAZINE 4 MG: 4 TABLET, FILM COATED ORAL at 08:13

## 2018-06-16 RX ADMIN — OLMESARTAN MEDOXOMIL 40 MG: 20 TABLET, COATED ORAL at 08:13

## 2018-06-16 RX ADMIN — ACETAMINOPHEN AND CODEINE PHOSPHATE 1 TABLET: 300; 30 TABLET ORAL at 00:33

## 2018-06-16 RX ADMIN — MEROPENEM 1 G: 1 INJECTION, POWDER, FOR SOLUTION INTRAVENOUS at 06:06

## 2018-06-16 RX ADMIN — ACETAMINOPHEN AND CODEINE PHOSPHATE 1 TABLET: 300; 30 TABLET ORAL at 14:21

## 2018-06-16 RX ADMIN — FUROSEMIDE 20 MG: 20 TABLET ORAL at 08:13

## 2018-06-16 RX ADMIN — ALLOPURINOL 300 MG: 300 TABLET ORAL at 08:13

## 2018-06-16 RX ADMIN — IPRATROPIUM BROMIDE AND ALBUTEROL SULFATE 3 ML: .5; 3 SOLUTION RESPIRATORY (INHALATION) at 19:45

## 2018-06-16 RX ADMIN — PERPHENAZINE 4 MG: 4 TABLET, FILM COATED ORAL at 20:20

## 2018-06-16 RX ADMIN — NICOTINE 1 PATCH: 21 PATCH TRANSDERMAL at 20:21

## 2018-06-16 RX ADMIN — ENOXAPARIN SODIUM 40 MG: 100 INJECTION SUBCUTANEOUS at 20:20

## 2018-06-16 RX ADMIN — IPRATROPIUM BROMIDE AND ALBUTEROL SULFATE 3 ML: .5; 3 SOLUTION RESPIRATORY (INHALATION) at 07:35

## 2018-06-16 RX ADMIN — DIVALPROEX SODIUM 500 MG: 500 TABLET, FILM COATED, EXTENDED RELEASE ORAL at 08:13

## 2018-06-16 RX ADMIN — ATORVASTATIN CALCIUM 20 MG: 20 TABLET, FILM COATED ORAL at 08:12

## 2018-06-17 LAB
ALBUMIN SERPL-MCNC: 3.9 G/DL (ref 3.5–5.2)
ALBUMIN/GLOB SERPL: 1.5 G/DL
ALP SERPL-CCNC: 88 U/L (ref 40–129)
ALT SERPL W P-5'-P-CCNC: 7 U/L (ref 5–33)
ANION GAP SERPL CALCULATED.3IONS-SCNC: 12.7 MMOL/L
AST SERPL-CCNC: 10 U/L (ref 5–32)
BASOPHILS # BLD AUTO: 0.06 10*3/MM3 (ref 0–0.2)
BASOPHILS NFR BLD AUTO: 0.5 % (ref 0–2)
BILIRUB SERPL-MCNC: 0.3 MG/DL (ref 0.2–1.2)
BUN BLD-MCNC: 10 MG/DL (ref 6–20)
BUN/CREAT SERPL: 12.7 (ref 7–25)
CALCIUM SPEC-SCNC: 10.1 MG/DL (ref 8.6–10.5)
CHLORIDE SERPL-SCNC: 98 MMOL/L (ref 98–107)
CO2 SERPL-SCNC: 27.3 MMOL/L (ref 22–29)
CREAT BLD-MCNC: 0.79 MG/DL (ref 0.57–1)
D-LACTATE SERPL-SCNC: 0.7 MMOL/L (ref 0.5–2)
DEPRECATED RDW RBC AUTO: 53.3 FL (ref 37–54)
EOSINOPHIL # BLD AUTO: 0.19 10*3/MM3 (ref 0.1–0.3)
EOSINOPHIL NFR BLD AUTO: 1.7 % (ref 0–4)
ERYTHROCYTE [DISTWIDTH] IN BLOOD BY AUTOMATED COUNT: 16.9 % (ref 11.5–14.5)
GFR SERPL CREATININE-BSD FRML MDRD: 77 ML/MIN/1.73
GLOBULIN UR ELPH-MCNC: 2.6 GM/DL
GLUCOSE BLD-MCNC: 91 MG/DL (ref 65–99)
GLUCOSE BLDC GLUCOMTR-MCNC: 150 MG/DL (ref 70–130)
GLUCOSE BLDC GLUCOMTR-MCNC: 228 MG/DL (ref 70–130)
GLUCOSE BLDC GLUCOMTR-MCNC: 94 MG/DL (ref 70–130)
GLUCOSE BLDC GLUCOMTR-MCNC: 98 MG/DL (ref 70–130)
HCT VFR BLD AUTO: 37 % (ref 37–47)
HGB BLD-MCNC: 11.9 G/DL (ref 12–16)
IMM GRANULOCYTES # BLD: 0.26 10*3/MM3 (ref 0–0.03)
IMM GRANULOCYTES NFR BLD: 2.3 % (ref 0–0.5)
LYMPHOCYTES # BLD AUTO: 1.97 10*3/MM3 (ref 0.6–4.8)
LYMPHOCYTES NFR BLD AUTO: 17.7 % (ref 20–45)
MCH RBC QN AUTO: 27.9 PG (ref 27–31)
MCHC RBC AUTO-ENTMCNC: 32.2 G/DL (ref 31–37)
MCV RBC AUTO: 86.9 FL (ref 81–99)
MONOCYTES # BLD AUTO: 1.11 10*3/MM3 (ref 0–1)
MONOCYTES NFR BLD AUTO: 10 % (ref 3–8)
NEUTROPHILS # BLD AUTO: 7.52 10*3/MM3 (ref 1.5–8.3)
NEUTROPHILS NFR BLD AUTO: 67.8 % (ref 45–70)
NRBC BLD MANUAL-RTO: 0 /100 WBC (ref 0–0)
PLATELET # BLD AUTO: 374 10*3/MM3 (ref 140–500)
PMV BLD AUTO: 8.9 FL (ref 7.4–10.4)
POTASSIUM BLD-SCNC: 4.4 MMOL/L (ref 3.5–5.2)
PROT SERPL-MCNC: 6.5 G/DL (ref 6–8.5)
RBC # BLD AUTO: 4.26 10*6/MM3 (ref 4.2–5.4)
SODIUM BLD-SCNC: 138 MMOL/L (ref 136–145)
VANCOMYCIN SERPL-MCNC: 10.6 MCG/ML (ref 5–40)
WBC NRBC COR # BLD: 11.11 10*3/MM3 (ref 4.8–10.8)

## 2018-06-17 PROCEDURE — 25010000002 MEROPENEM PER 100 MG: Performed by: FAMILY MEDICINE

## 2018-06-17 PROCEDURE — 63710000001 INSULIN ASPART PER 5 UNITS: Performed by: FAMILY MEDICINE

## 2018-06-17 PROCEDURE — 80202 ASSAY OF VANCOMYCIN: CPT | Performed by: FAMILY MEDICINE

## 2018-06-17 PROCEDURE — 80053 COMPREHEN METABOLIC PANEL: CPT | Performed by: INTERNAL MEDICINE

## 2018-06-17 PROCEDURE — 25010000002 VANCOMYCIN

## 2018-06-17 PROCEDURE — 82962 GLUCOSE BLOOD TEST: CPT

## 2018-06-17 PROCEDURE — 99231 SBSQ HOSP IP/OBS SF/LOW 25: CPT | Performed by: HOSPITALIST

## 2018-06-17 PROCEDURE — 85025 COMPLETE CBC W/AUTO DIFF WBC: CPT | Performed by: INTERNAL MEDICINE

## 2018-06-17 PROCEDURE — 25010000002 ENOXAPARIN PER 10 MG: Performed by: FAMILY MEDICINE

## 2018-06-17 PROCEDURE — 94799 UNLISTED PULMONARY SVC/PX: CPT

## 2018-06-17 PROCEDURE — 83605 ASSAY OF LACTIC ACID: CPT | Performed by: INTERNAL MEDICINE

## 2018-06-17 RX ADMIN — SPIRONOLACTONE 25 MG: 25 TABLET ORAL at 08:19

## 2018-06-17 RX ADMIN — ALLOPURINOL 300 MG: 300 TABLET ORAL at 08:19

## 2018-06-17 RX ADMIN — MEROPENEM 1 G: 1 INJECTION, POWDER, FOR SOLUTION INTRAVENOUS at 06:29

## 2018-06-17 RX ADMIN — NICOTINE 1 PATCH: 21 PATCH TRANSDERMAL at 21:05

## 2018-06-17 RX ADMIN — ACETAMINOPHEN AND CODEINE PHOSPHATE 1 TABLET: 300; 30 TABLET ORAL at 09:41

## 2018-06-17 RX ADMIN — Medication 1500 MG: at 13:42

## 2018-06-17 RX ADMIN — IPRATROPIUM BROMIDE AND ALBUTEROL SULFATE 3 ML: .5; 3 SOLUTION RESPIRATORY (INHALATION) at 11:25

## 2018-06-17 RX ADMIN — PIOGLITAZONE 15 MG: 30 TABLET ORAL at 08:20

## 2018-06-17 RX ADMIN — MEROPENEM 1 G: 1 INJECTION, POWDER, FOR SOLUTION INTRAVENOUS at 16:52

## 2018-06-17 RX ADMIN — IPRATROPIUM BROMIDE AND ALBUTEROL SULFATE 3 ML: .5; 3 SOLUTION RESPIRATORY (INHALATION) at 15:56

## 2018-06-17 RX ADMIN — ACETAMINOPHEN AND CODEINE PHOSPHATE 1 TABLET: 300; 30 TABLET ORAL at 03:03

## 2018-06-17 RX ADMIN — ENOXAPARIN SODIUM 40 MG: 100 INJECTION SUBCUTANEOUS at 21:03

## 2018-06-17 RX ADMIN — ATORVASTATIN CALCIUM 20 MG: 20 TABLET, FILM COATED ORAL at 08:19

## 2018-06-17 RX ADMIN — IPRATROPIUM BROMIDE AND ALBUTEROL SULFATE 3 ML: .5; 3 SOLUTION RESPIRATORY (INHALATION) at 19:58

## 2018-06-17 RX ADMIN — IPRATROPIUM BROMIDE AND ALBUTEROL SULFATE 3 ML: .5; 3 SOLUTION RESPIRATORY (INHALATION) at 07:28

## 2018-06-17 RX ADMIN — PANTOPRAZOLE SODIUM 40 MG: 40 TABLET, DELAYED RELEASE ORAL at 06:29

## 2018-06-17 RX ADMIN — FUROSEMIDE 20 MG: 20 TABLET ORAL at 08:19

## 2018-06-17 RX ADMIN — ACETAMINOPHEN AND CODEINE PHOSPHATE 1 TABLET: 300; 30 TABLET ORAL at 23:14

## 2018-06-17 RX ADMIN — PERPHENAZINE 4 MG: 4 TABLET, FILM COATED ORAL at 08:19

## 2018-06-17 RX ADMIN — ACETAMINOPHEN AND CODEINE PHOSPHATE 1 TABLET: 300; 30 TABLET ORAL at 17:20

## 2018-06-17 RX ADMIN — INSULIN ASPART 8 UNITS: 100 INJECTION, SOLUTION INTRAVENOUS; SUBCUTANEOUS at 13:42

## 2018-06-17 RX ADMIN — PERPHENAZINE 4 MG: 4 TABLET, FILM COATED ORAL at 21:04

## 2018-06-17 RX ADMIN — OLMESARTAN MEDOXOMIL 40 MG: 20 TABLET, COATED ORAL at 08:19

## 2018-06-17 RX ADMIN — DIVALPROEX SODIUM 500 MG: 500 TABLET, FILM COATED, EXTENDED RELEASE ORAL at 08:19

## 2018-06-18 ENCOUNTER — APPOINTMENT (OUTPATIENT)
Dept: GENERAL RADIOLOGY | Facility: HOSPITAL | Age: 48
End: 2018-06-18

## 2018-06-18 VITALS
BODY MASS INDEX: 50.02 KG/M2 | HEIGHT: 64 IN | DIASTOLIC BLOOD PRESSURE: 61 MMHG | TEMPERATURE: 98.7 F | OXYGEN SATURATION: 93 % | SYSTOLIC BLOOD PRESSURE: 138 MMHG | RESPIRATION RATE: 18 BRPM | HEART RATE: 82 BPM | WEIGHT: 293 LBS

## 2018-06-18 LAB
BACTERIA SPEC AEROBE CULT: NORMAL
GLUCOSE BLDC GLUCOMTR-MCNC: 97 MG/DL (ref 70–130)

## 2018-06-18 PROCEDURE — 94799 UNLISTED PULMONARY SVC/PX: CPT

## 2018-06-18 PROCEDURE — 25010000002 MEROPENEM PER 100 MG: Performed by: FAMILY MEDICINE

## 2018-06-18 PROCEDURE — 25010000002 VANCOMYCIN

## 2018-06-18 PROCEDURE — 71046 X-RAY EXAM CHEST 2 VIEWS: CPT

## 2018-06-18 PROCEDURE — 82962 GLUCOSE BLOOD TEST: CPT

## 2018-06-18 RX ORDER — FLUCONAZOLE 150 MG/1
150 TABLET ORAL DAILY
Qty: 3 TABLET | Refills: 0 | Status: SHIPPED | OUTPATIENT
Start: 2018-06-18 | End: 2018-06-21

## 2018-06-18 RX ORDER — DOXYCYCLINE HYCLATE 100 MG/1
100 CAPSULE ORAL 2 TIMES DAILY
Qty: 14 CAPSULE | Refills: 0 | Status: SHIPPED | OUTPATIENT
Start: 2018-06-18 | End: 2019-03-31

## 2018-06-18 RX ADMIN — ATORVASTATIN CALCIUM 20 MG: 20 TABLET, FILM COATED ORAL at 08:11

## 2018-06-18 RX ADMIN — DIVALPROEX SODIUM 500 MG: 500 TABLET, FILM COATED, EXTENDED RELEASE ORAL at 08:11

## 2018-06-18 RX ADMIN — MEROPENEM 1 G: 1 INJECTION, POWDER, FOR SOLUTION INTRAVENOUS at 01:40

## 2018-06-18 RX ADMIN — PIOGLITAZONE 15 MG: 30 TABLET ORAL at 08:12

## 2018-06-18 RX ADMIN — PANTOPRAZOLE SODIUM 40 MG: 40 TABLET, DELAYED RELEASE ORAL at 08:11

## 2018-06-18 RX ADMIN — OLMESARTAN MEDOXOMIL 40 MG: 20 TABLET, COATED ORAL at 08:11

## 2018-06-18 RX ADMIN — ALLOPURINOL 300 MG: 300 TABLET ORAL at 08:11

## 2018-06-18 RX ADMIN — IPRATROPIUM BROMIDE AND ALBUTEROL SULFATE 3 ML: .5; 3 SOLUTION RESPIRATORY (INHALATION) at 07:38

## 2018-06-18 RX ADMIN — Medication 1500 MG: at 04:31

## 2018-06-18 RX ADMIN — SPIRONOLACTONE 25 MG: 25 TABLET ORAL at 08:12

## 2018-06-18 RX ADMIN — FUROSEMIDE 20 MG: 20 TABLET ORAL at 08:12

## 2018-06-18 RX ADMIN — PERPHENAZINE 4 MG: 4 TABLET, FILM COATED ORAL at 08:13

## 2018-12-07 ENCOUNTER — TRANSCRIBE ORDERS (OUTPATIENT)
Dept: ADMINISTRATIVE | Facility: HOSPITAL | Age: 48
End: 2018-12-07

## 2018-12-07 ENCOUNTER — HOSPITAL ENCOUNTER (OUTPATIENT)
Dept: GENERAL RADIOLOGY | Facility: HOSPITAL | Age: 48
Discharge: HOME | End: 2018-12-07
Admitting: FAMILY MEDICINE

## 2018-12-07 DIAGNOSIS — M25.551 RIGHT HIP PAIN: Primary | ICD-10-CM

## 2018-12-07 DIAGNOSIS — M25.551 RIGHT HIP PAIN: ICD-10-CM

## 2018-12-07 PROCEDURE — 73502 X-RAY EXAM HIP UNI 2-3 VIEWS: CPT

## 2019-03-31 ENCOUNTER — HOSPITAL ENCOUNTER (EMERGENCY)
Facility: HOSPITAL | Age: 49
Discharge: HOME | End: 2019-03-31
Attending: EMERGENCY MEDICINE | Admitting: EMERGENCY MEDICINE

## 2019-03-31 VITALS
DIASTOLIC BLOOD PRESSURE: 116 MMHG | TEMPERATURE: 98 F | HEART RATE: 88 BPM | BODY MASS INDEX: 46.61 KG/M2 | WEIGHT: 273 LBS | OXYGEN SATURATION: 92 % | SYSTOLIC BLOOD PRESSURE: 188 MMHG | RESPIRATION RATE: 20 BRPM | HEIGHT: 64 IN

## 2019-03-31 DIAGNOSIS — E65 ABDOMINAL PANNUS: ICD-10-CM

## 2019-03-31 DIAGNOSIS — R10.32 ABDOMINAL WALL PAIN IN BOTH LOWER QUADRANTS: Primary | ICD-10-CM

## 2019-03-31 DIAGNOSIS — R10.31 ABDOMINAL WALL PAIN IN BOTH LOWER QUADRANTS: Primary | ICD-10-CM

## 2019-03-31 PROCEDURE — 99282 EMERGENCY DEPT VISIT SF MDM: CPT | Performed by: EMERGENCY MEDICINE

## 2019-03-31 PROCEDURE — 99283 EMERGENCY DEPT VISIT LOW MDM: CPT

## 2019-03-31 RX ORDER — PREGABALIN 75 MG/1
75 CAPSULE ORAL 2 TIMES DAILY
Status: ON HOLD | COMMUNITY
End: 2019-10-03

## 2019-03-31 RX ORDER — CEFDINIR 300 MG/1
300 CAPSULE ORAL 2 TIMES DAILY
COMMUNITY
End: 2019-09-26 | Stop reason: HOSPADM

## 2019-03-31 RX ORDER — NYSTATIN 100000 [USP'U]/G
POWDER TOPICAL 2 TIMES DAILY
Qty: 30 G | Refills: 0 | Status: SHIPPED | OUTPATIENT
Start: 2019-03-31

## 2019-03-31 RX ORDER — TIZANIDINE 4 MG/1
4 TABLET ORAL NIGHTLY PRN
COMMUNITY

## 2019-04-04 ENCOUNTER — TRANSCRIBE ORDERS (OUTPATIENT)
Dept: ADMINISTRATIVE | Facility: HOSPITAL | Age: 49
End: 2019-04-04

## 2019-04-04 DIAGNOSIS — H16.429: Primary | ICD-10-CM

## 2019-04-08 ENCOUNTER — HOSPITAL ENCOUNTER (OUTPATIENT)
Dept: ULTRASOUND IMAGING | Facility: HOSPITAL | Age: 49
Discharge: HOME | End: 2019-04-08
Admitting: FAMILY MEDICINE

## 2019-04-08 DIAGNOSIS — H16.429: ICD-10-CM

## 2019-04-08 PROCEDURE — 76700 US EXAM ABDOM COMPLETE: CPT

## 2019-06-07 ENCOUNTER — OFFICE VISIT (OUTPATIENT)
Dept: ORTHOPEDIC SURGERY | Facility: CLINIC | Age: 49
End: 2019-06-07

## 2019-06-07 VITALS — HEIGHT: 64 IN | BODY MASS INDEX: 46.61 KG/M2 | WEIGHT: 273 LBS

## 2019-06-07 DIAGNOSIS — R52 PAIN: Primary | ICD-10-CM

## 2019-06-07 DIAGNOSIS — M17.0 PRIMARY OSTEOARTHRITIS OF BOTH KNEES: ICD-10-CM

## 2019-06-07 PROCEDURE — 20610 DRAIN/INJ JOINT/BURSA W/O US: CPT | Performed by: NURSE PRACTITIONER

## 2019-06-07 PROCEDURE — 99213 OFFICE O/P EST LOW 20 MIN: CPT | Performed by: NURSE PRACTITIONER

## 2019-06-07 RX ORDER — HYDROXYZINE PAMOATE 25 MG/1
CAPSULE ORAL
COMMUNITY
Start: 2019-06-05 | End: 2019-09-26 | Stop reason: HOSPADM

## 2019-06-07 RX ORDER — OMEGA-3-ACID ETHYL ESTERS 1 G/1
CAPSULE, LIQUID FILLED ORAL
Status: ON HOLD | COMMUNITY
Start: 2019-05-13 | End: 2019-09-17

## 2019-06-07 RX ORDER — PERPHENAZINE 8 MG/1
TABLET ORAL
COMMUNITY
Start: 2019-05-28 | End: 2019-09-26 | Stop reason: HOSPADM

## 2019-06-07 RX ADMIN — LIDOCAINE HYDROCHLORIDE 8 ML: 10 INJECTION, SOLUTION EPIDURAL; INFILTRATION; INTRACAUDAL; PERINEURAL at 15:14

## 2019-06-07 RX ADMIN — BETAMETHASONE SODIUM PHOSPHATE AND BETAMETHASONE ACETATE 12 MG: 3; 3 INJECTION, SUSPENSION INTRA-ARTICULAR; INTRALESIONAL; INTRAMUSCULAR; SOFT TISSUE at 15:14

## 2019-06-07 NOTE — PROGRESS NOTES
Subjective:     Patient ID: Kirsten Centeno is a 49 y.o. female.    Chief Complaint:  Follow-up primary asked arthritis bilateral knees  History of Present Illness  Kirsten Centeno presents back to clinic for bilateral knee pain.  Last seen with his APRN 3/23/2018 received corticosteroid injections at that time has done significantly well.  Pain has returned over the last several weeks present at the medial joint line.  Increased pain present with all ambulatory activities, transitional asked activities such as from seated to standing attempting to walk, ascending and descending steps she avoid secondary to the pain.  Rates discomfort today at an 8-9 out of 10 aching throbbing in nature at the medial joint line and the across the anterior aspect of the knees.  She is experiencing pain at rest as well as with activity.  She is not using assistive device for ambulating and denies that she is experiencing numbness or tingling radiating down bilateral lower extremities.  Pain is not radiating into groin or into the lateral aspect of her hip at this time.  She is taking Tylenol as well as meloxicam for symptom relief.  Denies all other concerns present this time.  Social History     Occupational History   • Not on file   Tobacco Use   • Smoking status: Current Every Day Smoker     Packs/day: 2.50     Types: Cigarettes   • Smokeless tobacco: Never Used   Substance and Sexual Activity   • Alcohol use: Yes     Comment: occasionally   • Drug use: No   • Sexual activity: Defer      Past Medical History:   Diagnosis Date   • Arthritis    • COPD (chronic obstructive pulmonary disease) (CMS/HCC)    • Diabetes (CMS/HCC)    • Gout    • HTN (hypertension)    • Hyperlipidemia    • Hyperthyroidism    • Neuropathy    • Obesity    • Sleep apnea      Past Surgical History:   Procedure Laterality Date   • MANDIBLE FRACTURE SURGERY         Family History   Problem Relation Age of Onset   • Cervical cancer Maternal Grandmother    • Cancer  "Maternal Grandmother    • Hypertension Father    • Heart disease Father          Review of Systems   Constitutional: Negative for chills, diaphoresis, fever and unexpected weight change.   HENT: Negative for hearing loss, nosebleeds, sore throat and tinnitus.    Eyes: Negative for pain and visual disturbance.   Respiratory: Negative for cough, shortness of breath and wheezing.    Cardiovascular: Negative for chest pain and palpitations.   Gastrointestinal: Negative for abdominal pain, diarrhea, nausea and vomiting.   Endocrine: Negative for cold intolerance, heat intolerance and polydipsia.   Genitourinary: Negative for difficulty urinating, dysuria and hematuria.   Musculoskeletal: Positive for arthralgias, joint swelling and myalgias.   Skin: Negative for rash and wound.   Allergic/Immunologic: Negative for environmental allergies.   Neurological: Negative for dizziness, syncope and numbness.   Hematological: Does not bruise/bleed easily.   Psychiatric/Behavioral: Negative for dysphoric mood and sleep disturbance. The patient is not nervous/anxious.            Objective:  Physical Exam    Vital signs reviewed.   General: No acute distress.  Eyes: conjunctiva clear; pupils equally round and reactive  ENT: external ears and nose atraumatic; oropharynx clear  CV: no peripheral edema  Resp: normal respiratory effort  Skin: no rashes or wounds; normal turgor  Psych: mood and affect appropriate; recent and remote memory intact    Vitals:    06/07/19 1436   Weight: 124 kg (273 lb)   Height: 162.6 cm (64\")         06/07/19  1436   Weight: 124 kg (273 lb)     Body mass index is 46.86 kg/m².      Right Knee Exam     Range of Motion   Extension: 5   Flexion: 90     Tests   Brandy:  Medial - positive Lateral - negative  Varus: negative Valgus: positive  Lachman:  Anterior - positive      Patellar apprehension: negative    Other   Erythema: absent  Sensation: normal  Pulse: present  Swelling: severe  Effusion: effusion " present    Comments:  Positive crepitus there are commission        Left Knee Exam     Tests   Brandy:  Medial - positive Lateral - negative  Varus: negative Valgus: positive  Lachman:  Anterior - positive      Patellar apprehension: negative    Other   Erythema: absent  Scars: absent  Sensation: normal  Pulse: present  Swelling: severe  Effusion: effusion present    Comments:  Positive crepitus there are commission                 Imaging:  Bilateral Knee X-Ray  Indication: Pain    AP, Lateral, and [place] views    Findings:  No fracture  No bony lesion  Normal soft tissues  Severely advanced tricompartmental osteoarthritis bilateral knees with osteophyte formation present lateral femoral condyle bilaterally, lateral tibial plateau bilaterally with bone-on-bone articulation of the medial joint and patellofemoral joint bilaterally  Prior studies were available for comparison.    Assessment:        1. Pain    2. Primary osteoarthritis of both knees           Plan:   1.  Discussed plan of care with patient.  Wish to proceed with corticosteroid injection bilateral knees.  Plan to see her back in approximately 6 weeks if not improving.  Did discuss with patient possibility of Visco supplementation injections bilateral knees if not improving.  Patient verbalized understanding of information agrees plan of care.  Denies other concerns present this time.  Large Joint Arthrocentesis  Date/Time: 6/7/2019 3:14 PM  Consent given by: patient  Site marked: site marked  Timeout: Immediately prior to procedure a time out was called to verify the correct patient, procedure, equipment, support staff and site/side marked as required   Supporting Documentation  Indications: pain   Procedure Details  Location: knee - Knee joint: bilateral.  Preparation: Patient was prepped and draped in the usual sterile fashion  Needle size: 22 G  Approach: superior  Medications administered: 8 mL lidocaine PF 1% 1 %; 12 mg betamethasone  acetate-betamethasone sodium phosphate 6 (3-3) MG/ML  Patient tolerance: patient tolerated the procedure well with no immediate complications          Orders:  Orders Placed This Encounter   Procedures   • Large Joint Arthrocentesis   • XR Knee 3 View Bilateral       I ordered and reviewed the CHAYITO today.     Dictated utilizing Dragon dictation

## 2019-06-10 RX ORDER — LIDOCAINE HYDROCHLORIDE 10 MG/ML
8 INJECTION, SOLUTION EPIDURAL; INFILTRATION; INTRACAUDAL; PERINEURAL
Status: COMPLETED | OUTPATIENT
Start: 2019-06-07 | End: 2019-06-07

## 2019-06-10 RX ORDER — BETAMETHASONE SODIUM PHOSPHATE AND BETAMETHASONE ACETATE 3; 3 MG/ML; MG/ML
12 INJECTION, SUSPENSION INTRA-ARTICULAR; INTRALESIONAL; INTRAMUSCULAR; SOFT TISSUE
Status: COMPLETED | OUTPATIENT
Start: 2019-06-07 | End: 2019-06-07

## 2019-07-17 ENCOUNTER — APPOINTMENT (OUTPATIENT)
Dept: GENERAL RADIOLOGY | Facility: HOSPITAL | Age: 49
End: 2019-07-17

## 2019-07-17 ENCOUNTER — HOSPITAL ENCOUNTER (EMERGENCY)
Facility: HOSPITAL | Age: 49
Discharge: HOME | End: 2019-07-17
Attending: EMERGENCY MEDICINE | Admitting: EMERGENCY MEDICINE

## 2019-07-17 VITALS
BODY MASS INDEX: 52.11 KG/M2 | WEIGHT: 276 LBS | HEIGHT: 61 IN | OXYGEN SATURATION: 94 % | SYSTOLIC BLOOD PRESSURE: 130 MMHG | RESPIRATION RATE: 18 BRPM | HEART RATE: 110 BPM | DIASTOLIC BLOOD PRESSURE: 94 MMHG | TEMPERATURE: 97.7 F

## 2019-07-17 DIAGNOSIS — S70.01XA CONTUSION OF RIGHT HIP, INITIAL ENCOUNTER: Primary | ICD-10-CM

## 2019-07-17 DIAGNOSIS — M16.0 OSTEOARTHRITIS OF BOTH HIPS, UNSPECIFIED OSTEOARTHRITIS TYPE: ICD-10-CM

## 2019-07-17 DIAGNOSIS — M47.816 OSTEOARTHRITIS OF LUMBAR SPINE, UNSPECIFIED SPINAL OSTEOARTHRITIS COMPLICATION STATUS: ICD-10-CM

## 2019-07-17 PROCEDURE — 72100 X-RAY EXAM L-S SPINE 2/3 VWS: CPT

## 2019-07-17 PROCEDURE — 99283 EMERGENCY DEPT VISIT LOW MDM: CPT

## 2019-07-17 PROCEDURE — 73502 X-RAY EXAM HIP UNI 2-3 VIEWS: CPT

## 2019-07-17 PROCEDURE — 99282 EMERGENCY DEPT VISIT SF MDM: CPT | Performed by: EMERGENCY MEDICINE

## 2019-07-17 RX ORDER — HYDROCODONE BITARTRATE AND ACETAMINOPHEN 5; 325 MG/1; MG/1
1 TABLET ORAL ONCE
Status: COMPLETED | OUTPATIENT
Start: 2019-07-17 | End: 2019-07-17

## 2019-07-17 RX ADMIN — HYDROCODONE BITARTRATE AND ACETAMINOPHEN 1 TABLET: 5; 325 TABLET ORAL at 12:21

## 2019-07-22 ENCOUNTER — OFFICE VISIT (OUTPATIENT)
Dept: ORTHOPEDIC SURGERY | Facility: CLINIC | Age: 49
End: 2019-07-22

## 2019-07-22 VITALS
DIASTOLIC BLOOD PRESSURE: 79 MMHG | HEART RATE: 120 BPM | WEIGHT: 267 LBS | SYSTOLIC BLOOD PRESSURE: 142 MMHG | HEIGHT: 61 IN | BODY MASS INDEX: 50.41 KG/M2

## 2019-07-22 DIAGNOSIS — M70.61 GREATER TROCHANTERIC BURSITIS OF RIGHT HIP: ICD-10-CM

## 2019-07-22 DIAGNOSIS — M17.0 PRIMARY OSTEOARTHRITIS OF BOTH KNEES: Primary | ICD-10-CM

## 2019-07-22 PROCEDURE — 99213 OFFICE O/P EST LOW 20 MIN: CPT | Performed by: NURSE PRACTITIONER

## 2019-07-22 PROCEDURE — 20610 DRAIN/INJ JOINT/BURSA W/O US: CPT | Performed by: NURSE PRACTITIONER

## 2019-07-22 RX ORDER — TRIAMCINOLONE ACETONIDE 40 MG/ML
80 INJECTION, SUSPENSION INTRA-ARTICULAR; INTRAMUSCULAR
Status: COMPLETED | OUTPATIENT
Start: 2019-07-22 | End: 2019-07-22

## 2019-07-22 RX ORDER — HYDROCODONE BITARTRATE AND ACETAMINOPHEN 7.5; 325 MG/1; MG/1
TABLET ORAL
COMMUNITY
Start: 2019-07-18 | End: 2019-09-26 | Stop reason: HOSPADM

## 2019-07-22 RX ORDER — LIDOCAINE HYDROCHLORIDE 10 MG/ML
4 INJECTION, SOLUTION EPIDURAL; INFILTRATION; INTRACAUDAL; PERINEURAL
Status: COMPLETED | OUTPATIENT
Start: 2019-07-22 | End: 2019-07-22

## 2019-07-22 RX ORDER — ACETAMINOPHEN AND CODEINE PHOSPHATE 300; 30 MG/1; MG/1
TABLET ORAL
Status: ON HOLD | COMMUNITY
Start: 2019-07-15 | End: 2019-09-17

## 2019-07-22 RX ADMIN — TRIAMCINOLONE ACETONIDE 80 MG: 40 INJECTION, SUSPENSION INTRA-ARTICULAR; INTRAMUSCULAR at 08:48

## 2019-07-22 RX ADMIN — LIDOCAINE HYDROCHLORIDE 4 ML: 10 INJECTION, SOLUTION EPIDURAL; INFILTRATION; INTRACAUDAL; PERINEURAL at 08:48

## 2019-07-22 NOTE — PROGRESS NOTES
Subjective:     Patient ID: Kirsten Centeno is a 49 y.o. female.    Chief Complaint:  Right hip pain   History of Present Illness  Kirsten Centeno presents to clinic with new concern of pain at the lateral aspect of the right hip.  She presents with her sister-in-law today who has brought her to clinic has fallen approximately 4 weeks ago hitting the lateral aspect of the hip and continues to experience pain.  She was recently provided with hydrocodone/acetaminophen and muscle relaxer by her primary care provider for a 10-day supply which she states she has already completed.  Rates discomfort a 9-10 out of a 10 aching throbbing in nature.  She has had x-rays completed here after complaining of so much pain that her sister-in-law brought her to ER for evaluation.  X-rays are available for viewing in chart.  Last time she was seen by this APRN 6/7/2019 received corticosteroid injections bilateral knees which has helped.  Denies pain radiating into groin.  She initially injured the lateral aspect of the hip after she was cooking slipped on grease striking the lateral aspect of the hip against the stove and was seen at [facility] ED on 7/17/2019.  Pain does radiate inferiorly laterally down the lower extremity denies pain radiating to the back.  Denies that she is experiencing numbness or tingling radiating down the right lower extremity at this time.  Pain has not improved with medications that she was previously provided.  Denies previously receiving corticosteroid injection lateral aspect of the right hip.    Social History     Occupational History   • Not on file   Tobacco Use   • Smoking status: Current Every Day Smoker     Packs/day: 2.50     Types: Cigarettes   • Smokeless tobacco: Never Used   Substance and Sexual Activity   • Alcohol use: Yes     Comment: occasionally   • Drug use: No   • Sexual activity: Defer      Past Medical History:   Diagnosis Date   • Arthritis    • COPD (chronic obstructive  pulmonary disease) (CMS/[facility])    • Diabetes (CMS/[facility])    • Gout    • HTN (hypertension)    • Hyperlipidemia    • Hyperthyroidism    • Neuropathy    • Obesity    • Sleep apnea      Past Surgical History:   Procedure Laterality Date   • MANDIBLE FRACTURE SURGERY         Family History   Problem Relation Age of Onset   • Cervical cancer Maternal Grandmother    • Cancer Maternal Grandmother    • Hypertension Father    • Heart disease Father          Review of Systems   Constitutional: Negative for chills, diaphoresis, fever and unexpected weight change.   HENT: Negative for hearing loss, nosebleeds, sore throat and tinnitus.    Eyes: Negative for pain and visual disturbance.   Respiratory: Negative for cough, shortness of breath and wheezing.    Cardiovascular: Negative for chest pain and palpitations.   Gastrointestinal: Negative for abdominal pain, diarrhea, nausea and vomiting.   Endocrine: Negative for cold intolerance, heat intolerance and polydipsia.   Genitourinary: Negative for difficulty urinating, dysuria and hematuria.   Musculoskeletal: Positive for arthralgias, back pain and myalgias. Negative for joint swelling.   Skin: Negative for rash and wound.   Allergic/Immunologic: Negative for environmental allergies and immunocompromised state.   Neurological: Negative for dizziness, syncope and numbness.   Hematological: Does not bruise/bleed easily.   Psychiatric/Behavioral: Negative for dysphoric mood and sleep disturbance. The patient is not nervous/anxious.            Objective:  Physical Exam    Vital signs reviewed.   General: No acute distress.  Eyes: conjunctiva clear; pupils equally round and reactive  ENT: external ears and nose atraumatic; oropharynx clear  CV: no peripheral edema  Resp: normal respiratory effort  Skin: no rashes or wounds; normal turgor  Psych: mood and affect appropriate; recent and remote memory intact    Vitals:    07/22/19 0820   BP: 142/79   BP Location: Left arm   Pulse: 120  "  Weight: 121 kg (267 lb)   Height: 154 cm (60.63\")         07/22/19  0820   Weight: 121 kg (267 lb)     Body mass index is 51.07 kg/m².      Right Hip Exam     Tenderness   The patient is experiencing tenderness in the greater trochanter.    Range of Motion   Abduction: 45   Adduction: 25   Extension: 0     Muscle Strength   Abduction: 4/5   Adduction: 4/5   Flexion: 4/5     Tests   Gaidel: negative    Other   Erythema: absent  Sensation: normal  Pulse: present    Comments:  Negative logroll exam  negative stinchfield exam             Imaging:  Independently reviewed imaging previously completed BH lag moderate hip arthrosis, right no evidence of fracture    Assessment:        1. Primary osteoarthritis of both knees    2. Greater trochanteric bursitis of right hip           Plan:  1.  Discussed plan of care with patient.  Wish to proceed with corticosteroid injection lateral aspect of the right hip.  2.  I do recommend home health physical therapy presents her home for bilateral knees and hip.  We will plan to see her back in approximately 6 weeks to reevaluate.  Patient verbalized understanding of all information and agrees with plan of care.  Denies other concerns that she has at this time.  Large Joint Arthrocentesis: R greater trochanteric bursa  Date/Time: 7/22/2019 8:48 AM  Consent given by: patient  Site marked: site marked  Timeout: Immediately prior to procedure a time out was called to verify the correct patient, procedure, equipment, support staff and site/side marked as required   Supporting Documentation  Indications: pain   Procedure Details  Location: hip - R greater trochanteric bursa  Preparation: Patient was prepped and draped in the usual sterile fashion  Needle size: 22 G  Approach: lateral  Medications administered: 4 mL lidocaine PF 1% 1 %; 80 mg triamcinolone acetonide 40 MG/ML  Patient tolerance: patient tolerated the procedure well with no immediate complications          Orders:  Orders " Placed This Encounter   Procedures   • Large Joint Arthrocentesis: R greater trochanteric bursa       Dictated utilizing Dragon dictation

## 2019-07-26 ENCOUNTER — TELEPHONE (OUTPATIENT)
Dept: ORTHOPEDIC SURGERY | Facility: CLINIC | Age: 49
End: 2019-07-26

## 2019-07-26 NOTE — TELEPHONE ENCOUNTER
[facility] called requesting an extension for home health.  Verbal ok given. Will send documentation for signature.

## 2019-07-29 ENCOUNTER — TELEPHONE (OUTPATIENT)
Dept: ORTHOPEDIC SURGERY | Facility: CLINIC | Age: 49
End: 2019-07-29

## 2019-08-01 ENCOUNTER — TELEPHONE (OUTPATIENT)
Dept: ORTHOPEDIC SURGERY | Facility: CLINIC | Age: 49
End: 2019-08-01

## 2019-08-01 RX ORDER — METHYLPREDNISOLONE 4 MG/1
TABLET ORAL
Qty: 21 TABLET | Refills: 0 | Status: ON HOLD | OUTPATIENT
Start: 2019-08-01 | End: 2019-09-17

## 2019-08-01 NOTE — TELEPHONE ENCOUNTER
Patient Called - her pain is a #10 in her right hip.  Would like something for it.... She knows she cant get pain med but wants medrol dose pack if possible.

## 2019-08-05 ENCOUNTER — HOSPITAL ENCOUNTER (OUTPATIENT)
Dept: PHYSICAL THERAPY | Facility: HOSPITAL | Age: 49
Setting detail: THERAPIES SERIES
Discharge: HOME | End: 2019-08-05

## 2019-08-05 DIAGNOSIS — M25.551 PAIN OF RIGHT HIP JOINT: ICD-10-CM

## 2019-08-05 DIAGNOSIS — J44.9 CHRONIC OBSTRUCTIVE PULMONARY DISEASE, UNSPECIFIED COPD TYPE (HCC): ICD-10-CM

## 2019-08-05 DIAGNOSIS — M51.37 DEGENERATION OF LUMBAR/LUMBOSACRAL DISC WITHOUT MYELOPATHY: ICD-10-CM

## 2019-08-05 DIAGNOSIS — Z74.09 IMPAIRED MOBILITY AND ACTIVITIES OF DAILY LIVING: ICD-10-CM

## 2019-08-05 DIAGNOSIS — Z78.9 IMPAIRED MOBILITY AND ACTIVITIES OF DAILY LIVING: ICD-10-CM

## 2019-08-05 DIAGNOSIS — E66.01 MORBID OBESITY (HCC): Primary | ICD-10-CM

## 2019-08-05 DIAGNOSIS — G89.29 CHRONIC PAIN OF BOTH KNEES: ICD-10-CM

## 2019-08-05 DIAGNOSIS — M25.561 CHRONIC PAIN OF BOTH KNEES: ICD-10-CM

## 2019-08-05 DIAGNOSIS — M25.562 CHRONIC PAIN OF BOTH KNEES: ICD-10-CM

## 2019-08-05 PROCEDURE — 97162 PT EVAL MOD COMPLEX 30 MIN: CPT

## 2019-08-05 NOTE — THERAPY DISCHARGE NOTE
"     Outpatient Physical Therapy Neuro Initial Evaluation/Discharge  MYAH Vieira     Patient Name: Kirsten Centeno  : 1970  MRN: [id number]  Today's Date: 2019      Visit Date: 2019    Patient Active Problem List   Diagnosis   • Primary osteoarthritis of both knees   • Cellulitis of abdominal wall   • Abdominal wall cellulitis   • Greater trochanteric bursitis of right hip        Past Medical History:   Diagnosis Date   • Arthritis    • COPD (chronic obstructive pulmonary disease) (CMS/[facility])    • Diabetes (CMS/[facility])    • Gout    • HTN (hypertension)    • Hyperlipidemia    • Hyperthyroidism    • Neuropathy    • Obesity    • Sleep apnea         Past Surgical History:   Procedure Laterality Date   • MANDIBLE FRACTURE SURGERY           Visit Dx:     ICD-10-CM ICD-9-CM   1. Morbid obesity (CMS/[facility]) E66.01 278.01   2. Degeneration of lumbar/lumbosacral disc without myelopathy M51.37 722.52   3. Chronic obstructive pulmonary disease, unspecified COPD type (CMS/[facility]) J44.9 496   4. Chronic pain of both knees M25.561 719.46    M25.562 338.29    G89.29    5. Pain of right hip joint M25.551 719.45   6. Impaired mobility and activities of daily living Z74.09 799.89       Patient History     Row Name 19 1400             History    Chief Complaint  Difficulty Walking;Falls/history of falls;Muscle weakness;Pain;Problems breathing/shortness of breath  -      Type of Pain  Hip pain;Knee pain  -      Date Current Problem(s) Began  -- unable to identify - \"several  years\" per patient  -      Brief Description of Current Complaint  Patient presents today with representative from [facility] for evaluation for power wheelchair.  She c/o bilateral hip and knee pain, right worse than left, with frequent buckling of the right knee which has resulted in falls despite use of a rolling walker for short distances.  Patient reports she is not a candidate for TKR due to poor lung capacity - history of COPD with c/o " shortness of breath with minimal activity.  Patient wears 3L O2 at night only.  Uses shower chair for occasional bathing and requires assist from  for transferring on/off and for dressing.  Sister in law works as paid caregiver as needed, but patient does spend extended periods of time home alone.    -      Patient/Caregiver Goals  Other (comment) obtain power wheelchair  -      Patient's Rating of General Health  Fair  -      Hand Dominance  right-handed  -         Pain     Pain Location  Hip;Knee  -      Pain at Present  5  -      Pain at Best  0  -LH      Pain at Worst  8  -      Pain Comments  pain in bilateral hips and knees (right more than left), increases with movement and weightbearing.  IMproves with rest, use of heating pad  -         Fall Risk Assessment    Any falls in the past year:  Yes  -      Number of falls reported in the last 12 months  3  -      Factors that contributed to the fall:  Lost balance;Fatigue;Tripped;Slippery surface  -      Does patient have a fear of falling  Yes (comment)  -         Daily Activities    Primary Language  English  -      Are you able to read  Yes  -      Are you able to write  Yes  -      How does patient learn best?  Reading  -      Teaching needs identified  Management of Condition  -      Patient is concerned about/has problems with  Difficulty with self care (i.e. bathing, dressing, toileting:;Performing home management (household chores, shopping, care of dependents);Transfers (getting out of a chair, bed);Walking  -      Does patient have problems with the following?  Depression;Anxiety;Panic Attack  -      Barriers to learning  None  -      Pt Participated in POC and Goals  Yes  -         Safety    Are you being hurt, hit, or frightened by anyone at home or in your life?  No  -LH      Are you being neglected by a caregiver  No  -        User Key  (r) = Recorded By, (t) = Taken By, (c) = Cosigned By     Initials Name Provider Type     Chaya Taylor, PT Physical Therapist              PT Neuro     Row Name 08/05/19 1500             Precautions and Contraindications    Precautions/Limitations  fall precautions  -         Home Living    Living Arrangements  house  -      Home Accessibility  stairs within home  -      Living Environment Comment  Patient lives with  in 2 story house.  LIves on first floor - has been unable to go up/down steps for several years per patient.  Uses rolling walker for short distances in home, shower chair, O2 at night  -         Vision-Basic Assessment    Current Vision  Wears glasses all the time  -      Visual History  Glaucoma  -         Cognition    Overall Cognitive Status  WFL  -      Arousal/Alertness  -- lethargic  -      Memory  Appears intact  -      Orientation Level  Oriented X4  -      Safety Judgment  Good awareness of safety precautions  -      Deficits  Fully aware of deficits  -      Comments  Patient lethargic, c/o being tired, reports pain meds she took prior to PT make her sleepy.  patient closing eyes multiple times throughout evaluation, but immediately opens and responds to therapist questions.  -         Sensation    Additional Comments  patient c/o numbness in tingling in toes, bilateral feet.  -         Posture/Observations    Alignment Options  Thoracic kyphosis;Rounded shoulders  -      Thoracic Kyphosis  Mild  -      Rounded Shoulders  Bilateral:;Mild  -      Posture/Observations Comments  Patient seated in manual wheelchair with significant posterior pelvic tilt.  Able to correct and sit upright upon request but does not maintain.  -         General ROM    GENERAL ROM COMMENTS  B LE and B UE AROM WFL  -         MMT (Manual Muscle Testing)    General MMT Comments  B UE grossly 3+/5, B LE 3-/5 hip flexion, 3+/5 quads and hamstrings.  Minimal effort noted with MMT, requiring max verbal cues to participate  -          Transfers    Bed-Chair [place] (Transfers)  contact guard  -      Chair-Bed [place] (Transfers)  contact guard  -      Assistive Device (Bed-Chair Transfers)  walker, front-wheeled  -LH      Sit-Stand [place] (Transfers)  contact guard  -      Stand-Sit [place] (Transfers)  contact guard  -      Transfers, Sit-Stand-Sit, Assist Device  rolling walker  -         Gait/Stairs Assessment/Training    [place] Level (Gait)  contact guard  -      Assistive Device (Gait)  walker, front-wheeled  -      Distance in Feet (Gait)  25  -      Deviations/Abnormal Patterns (Gait)  base of support, wide;gait speed decreased;stride length decreased  -      Bilateral Gait Deviations  forward flexed posture;heel strike decreased;weight shift ability decreased  -      Comment (Gait/Stairs)  O2 sats 86-91% at rest.  After ambulating 25 feet, O2 sats 77%.  Gradual increase back to 86% with pursed lip breathing x 5 minutes.  Max cues/demonstration for pursed lip breathing  -         Balance Skills Training    Balance Comments  Unable to safely stand without UE support.  See Philippe for further details.  -        User Key  (r) = Recorded By, (t) = Taken By, (c) = Cosigned By    Initials Name Provider Type     Chaya Taylor, PT Physical Therapist          PT Assessment/Plan     Row Name 08/05/19 1500          PT Assessment    Functional Limitations  Decreased safety during functional activities;Impaired gait;Limitation in home management;Performance in self-care ADL  -     Impairments  Balance;Endurance;Gait;Impaired gas exchange;Muscle strength;Pain;Sensation  -     Assessment Comments  Mrs. Centeno demonstrates bilateral upper and lower body weakness and c/o bilateral hip and knee pain which significantly impairs her ability to walk.  She demonstrates decreased standing balance with a history of falls despite use of a rolling walker.  Due to her generalized weakness and poor  "balance (Tinetti Score of 12/28) she is also unsafe to propel a scooter device and does not have the upper body strength or lung capacity to self propel a manual wheelchair (O2 sats down to 77% with 25 feet ambulation). For these reasons, Mrs. Centeno would benefit from a power wheelchair.  A power wheelchair will allow her increased safety and efficiency with household ambulation and ADL's, allowing her more independence and decreasing her risk of falls.  -     Please refer to paper survey for additional self-reported information  No  -     Patient/caregiver participated in establishment of treatment plan and goals  Yes  -        PT Plan    PT Frequency  One time visit  -     PT Plan Comments  Seen for evaluation only for power wheelchair.  -       User Key  (r) = Recorded By, (t) = Taken By, (c) = Cosigned By    Initials Name Provider Type     Chaya Taylor, PT Physical Therapist                           Outcome Measure Options: Tinetti  Tinetti Assessment  Tinetti Assessment: yes  Sitting Balance: Leans or slides in chair  Arises: Able, uses arms  Attempts to Rise: Able in 1 attempt  Immediate Standing Balance (first 5 sec): Steady, with support  Standing Balance: Steady, stance > 4 inch DUANE & requires support  Sternal Nudge (feet close together): Begins to fall  Eyes Closed (feet close together): Unsteady  Turning 360 Degrees- Steps: Discontinuous steps  Turning 360 Degrees- Steadiness: Unsteady (staggers, grabs)  Sitting Down: Uses arms or not a smooth motion  Tinetti Balance Score: 6  Gait Initiation (immediate after told \"go\"): No hesitancy  Step Length- Right Swing: Right swing foot does not pass Left stance leg  Step Length- Left Swing: Left swing foot does not pass Right  Foot Clearance- Right Foot: Right foot completely clears floor  Foot Clearance- Left Foot: Left foot completely clears floor  Step Symmetry: Right and Left step length equal  Step Continuity: Steps appear continuous  Path " (excursion): Mild/moderate deviation or use of aid  Trunk: Marked sway or uses device  Base of Support: Heels apart  Gait Score: 6  Tinetti Total Score: 12  Tinetti Assistive Device: rolling walker      Time Calculation:   Start Time: 1303  Stop Time: 1359  Time Calculation (min): 56 min     Therapy Charges for Today     Code Description Service Date Service Provider Modifiers Qty    93111152688 HC PT EVAL MOD COMPLEXITY 4 8/5/2019 Chaya Taylor, PT GP 1          PT G-Codes  Outcome Measure Options: Tinetti  Tinetti Total Score: 12              Chaya Taylor, PT  8/5/2019

## 2019-09-16 ENCOUNTER — APPOINTMENT (OUTPATIENT)
Dept: GENERAL RADIOLOGY | Facility: HOSPITAL | Age: 49
End: 2019-09-16

## 2019-09-16 ENCOUNTER — HOSPITAL ENCOUNTER (INPATIENT)
Facility: HOSPITAL | Age: 49
LOS: 10 days | Discharge: HOME | End: 2019-09-26
Attending: EMERGENCY MEDICINE | Admitting: FAMILY MEDICINE

## 2019-09-16 DIAGNOSIS — E87.1 HYPONATREMIA: ICD-10-CM

## 2019-09-16 DIAGNOSIS — J43.2 CENTRILOBULAR EMPHYSEMA (HCC): ICD-10-CM

## 2019-09-16 DIAGNOSIS — R53.1 WEAKNESS: ICD-10-CM

## 2019-09-16 DIAGNOSIS — R29.6 FREQUENT FALLS: Primary | ICD-10-CM

## 2019-09-16 LAB
ALBUMIN SERPL-MCNC: 3.7 G/DL (ref 3.5–5.2)
ALBUMIN SERPL-MCNC: 3.8 G/DL (ref 3.5–5.2)
ALBUMIN/GLOB SERPL: 1.4 G/DL
ALBUMIN/GLOB SERPL: 1.6 G/DL
ALP SERPL-CCNC: 75 U/L (ref 39–117)
ALP SERPL-CCNC: 78 U/L (ref 39–117)
ALT SERPL W P-5'-P-CCNC: 11 U/L (ref 1–33)
ALT SERPL W P-5'-P-CCNC: 11 U/L (ref 1–33)
AMPHET+METHAMPHET UR QL: NEGATIVE
ANION GAP SERPL CALCULATED.3IONS-SCNC: 9.6 MMOL/L (ref 5–15)
ANION GAP SERPL CALCULATED.3IONS-SCNC: 9.9 MMOL/L (ref 5–15)
ANISOCYTOSIS BLD QL: ABNORMAL
ANISOCYTOSIS BLD QL: NORMAL
ARTERIAL PATENCY WRIST A: POSITIVE
ARTERIAL PATENCY WRIST A: POSITIVE
AST SERPL-CCNC: 13 U/L (ref 1–32)
AST SERPL-CCNC: 14 U/L (ref 1–32)
ATMOSPHERIC PRESS: 739 MMHG
ATMOSPHERIC PRESS: 740 MMHG
BARBITURATES UR QL SCN: NEGATIVE
BASE EXCESS BLDA CALC-SCNC: 6.2 MMOL/L (ref 0–2)
BASE EXCESS BLDA CALC-SCNC: 6.9 MMOL/L (ref 0–2)
BASOPHILS # BLD AUTO: 0.02 10*3/MM3 (ref 0–0.2)
BASOPHILS # BLD AUTO: 0.02 10*3/MM3 (ref 0–0.2)
BASOPHILS NFR BLD AUTO: 0.1 % (ref 0–1.5)
BASOPHILS NFR BLD AUTO: 0.2 % (ref 0–1.5)
BDY SITE: ABNORMAL
BDY SITE: ABNORMAL
BENZODIAZ UR QL SCN: NEGATIVE
BILIRUB SERPL-MCNC: 0.5 MG/DL (ref 0.2–1.2)
BILIRUB SERPL-MCNC: 0.5 MG/DL (ref 0.2–1.2)
BILIRUB UR QL STRIP: ABNORMAL
BLASTS NFR BLD MANUAL: 0 % (ref 0–0)
BODY TEMPERATURE: 98.6 C
BODY TEMPERATURE: 98.6 C
BUN BLD-MCNC: 20 MG/DL (ref 6–20)
BUN BLD-MCNC: 22 MG/DL (ref 6–20)
BUN/CREAT SERPL: 20.6 (ref 7–25)
BUN/CREAT SERPL: 22.4 (ref 7–25)
CALCIUM SPEC-SCNC: 9.6 MG/DL (ref 8.6–10.5)
CALCIUM SPEC-SCNC: 9.7 MG/DL (ref 8.6–10.5)
CANNABINOIDS SERPL QL: NEGATIVE
CHLORIDE SERPL-SCNC: 86 MMOL/L (ref 98–107)
CHLORIDE SERPL-SCNC: 86 MMOL/L (ref 98–107)
CLARITY UR: CLEAR
CO2 SERPL-SCNC: 31.4 MMOL/L (ref 22–29)
CO2 SERPL-SCNC: 32.1 MMOL/L (ref 22–29)
COCAINE UR QL: NEGATIVE
COLOR UR: YELLOW
CREAT BLD-MCNC: 0.97 MG/DL (ref 0.57–1)
CREAT BLD-MCNC: 0.98 MG/DL (ref 0.57–1)
DEPRECATED RDW RBC AUTO: 61 FL (ref 37–54)
DEPRECATED RDW RBC AUTO: 61 FL (ref 37–54)
EOSINOPHIL # BLD AUTO: 0.06 10*3/MM3 (ref 0–0.4)
EOSINOPHIL # BLD AUTO: 0.1 10*3/MM3 (ref 0–0.4)
EOSINOPHIL NFR BLD AUTO: 0.5 % (ref 0.3–6.2)
EOSINOPHIL NFR BLD AUTO: 0.7 % (ref 0.3–6.2)
EPAP: 12
ERYTHROCYTE [DISTWIDTH] IN BLOOD BY AUTOMATED COUNT: 18.6 % (ref 12.3–15.4)
ERYTHROCYTE [DISTWIDTH] IN BLOOD BY AUTOMATED COUNT: 18.9 % (ref 12.3–15.4)
GAS FLOW AIRWAY: 3 LPM
GAS FLOW AIRWAY: 7 LPM
GFR SERPL CREATININE-BSD FRML MDRD: 60 ML/MIN/1.73
GFR SERPL CREATININE-BSD FRML MDRD: 61 ML/MIN/1.73
GLOBULIN UR ELPH-MCNC: 2.4 GM/DL
GLOBULIN UR ELPH-MCNC: 2.7 GM/DL
GLUCOSE BLD-MCNC: 83 MG/DL (ref 65–99)
GLUCOSE BLD-MCNC: 96 MG/DL (ref 65–99)
GLUCOSE UR STRIP-MCNC: NEGATIVE MG/DL
HCO3 BLDA-SCNC: 34.7 MMOL/L (ref 20–26)
HCO3 BLDA-SCNC: 35.2 MMOL/L (ref 20–26)
HCT VFR BLD AUTO: 45 % (ref 34–46.6)
HCT VFR BLD AUTO: 46.3 % (ref 34–46.6)
HGB BLD-MCNC: 14.6 G/DL (ref 12–15.9)
HGB BLD-MCNC: 14.8 G/DL (ref 12–15.9)
HGB BLDA-MCNC: 14.1 G/DL (ref 13.5–17.5)
HGB BLDA-MCNC: 14.6 G/DL (ref 13.5–17.5)
HGB UR QL STRIP.AUTO: NEGATIVE
IMM GRANULOCYTES # BLD AUTO: 1.79 10*3/MM3 (ref 0–0.05)
IMM GRANULOCYTES # BLD AUTO: 1.92 10*3/MM3 (ref 0–0.05)
IMM GRANULOCYTES NFR BLD AUTO: 12.7 % (ref 0–0.5)
IMM GRANULOCYTES NFR BLD AUTO: 14.7 % (ref 0–0.5)
IPAP: 16
KETONES UR QL STRIP: ABNORMAL
LEUKOCYTE ESTERASE UR QL STRIP.AUTO: NEGATIVE
LYMPHOCYTES # BLD AUTO: 1.25 10*3/MM3 (ref 0.7–3.1)
LYMPHOCYTES # BLD AUTO: 1.97 10*3/MM3 (ref 0.7–3.1)
LYMPHOCYTES # BLD MANUAL: 1.05 10*3/MM3 (ref 0.7–3.1)
LYMPHOCYTES NFR BLD AUTO: 13.9 % (ref 19.6–45.3)
LYMPHOCYTES NFR BLD AUTO: 9.5 % (ref 19.6–45.3)
LYMPHOCYTES NFR BLD MANUAL: 4 % (ref 5–12)
LYMPHOCYTES NFR BLD MANUAL: 8 % (ref 19.6–45.3)
Lab: ABNORMAL
Lab: ABNORMAL
MCH RBC QN AUTO: 28.8 PG (ref 26.6–33)
MCH RBC QN AUTO: 29.3 PG (ref 26.6–33)
MCHC RBC AUTO-ENTMCNC: 32 G/DL (ref 31.5–35.7)
MCHC RBC AUTO-ENTMCNC: 32.4 G/DL (ref 31.5–35.7)
MCV RBC AUTO: 90.2 FL (ref 79–97)
MCV RBC AUTO: 90.3 FL (ref 79–97)
METAMYELOCYTES NFR BLD MANUAL: 2 % (ref 0–0)
METHADONE UR QL SCN: NEGATIVE
MODALITY: ABNORMAL
MODALITY: ABNORMAL
MONOCYTES # BLD AUTO: 0.52 10*3/MM3 (ref 0.1–0.9)
MONOCYTES # BLD AUTO: 0.7 10*3/MM3 (ref 0.1–0.9)
MONOCYTES # BLD AUTO: 1.62 10*3/MM3 (ref 0.1–0.9)
MONOCYTES NFR BLD AUTO: 11.5 % (ref 5–12)
MONOCYTES NFR BLD AUTO: 5.3 % (ref 5–12)
MYELOCYTES NFR BLD MANUAL: 0 % (ref 0–0)
NEUTROPHILS # BLD AUTO: 10.48 10*3/MM3 (ref 1.7–7)
NEUTROPHILS # BLD AUTO: 8.63 10*3/MM3 (ref 1.7–7)
NEUTROPHILS # BLD AUTO: 9.15 10*3/MM3 (ref 1.7–7)
NEUTROPHILS NFR BLD AUTO: 61.1 % (ref 42.7–76)
NEUTROPHILS NFR BLD AUTO: 69.8 % (ref 42.7–76)
NEUTROPHILS NFR BLD MANUAL: 75 % (ref 42.7–76)
NEUTS BAND NFR BLD MANUAL: 5 % (ref 0–5)
NITRITE UR QL STRIP: NEGATIVE
NOTIFIED BY: ABNORMAL
NOTIFIED BY: ABNORMAL
NOTIFIED WHO: ABNORMAL
NOTIFIED WHO: ABNORMAL
NRBC BLD AUTO-RTO: 0.2 /100 WBC (ref 0–0.2)
NRBC BLD AUTO-RTO: 0.6 /100 WBC (ref 0–0.2)
NRBC SPEC MANUAL: 2 /100 WBC (ref 0–0.2)
NT-PROBNP SERPL-MCNC: 605.2 PG/ML (ref 5–900)
OPIATES UR QL: POSITIVE
OSMOLALITY UR: 307 MOSM/KG
OTHER CELLS %: 0 % (ref 0–0)
OXYCODONE UR QL SCN: NEGATIVE
PCO2 BLDA: 66 MM HG (ref 35–45)
PCO2 BLDA: 66.4 MM HG (ref 35–45)
PCO2 TEMP ADJ BLD: 66 MM HG (ref 35–45)
PCO2 TEMP ADJ BLD: 66.4 MM HG (ref 35–45)
PH BLDA: 7.33 PH UNITS (ref 7.35–7.45)
PH BLDA: 7.34 PH UNITS (ref 7.35–7.45)
PH UR STRIP.AUTO: 6 [PH] (ref 4.5–8)
PH, TEMP CORRECTED: 7.33 PH UNITS (ref 7.35–7.45)
PH, TEMP CORRECTED: 7.34 PH UNITS (ref 7.35–7.45)
PLASMA CELL PREC NFR BLD MANUAL: 0 % (ref 0–0)
PLAT MORPH BLD: NORMAL
PLAT MORPH BLD: NORMAL
PLATELET # BLD AUTO: 177 10*3/MM3 (ref 140–450)
PLATELET # BLD AUTO: 190 10*3/MM3 (ref 140–450)
PMV BLD AUTO: 9.5 FL (ref 6–12)
PMV BLD AUTO: 9.7 FL (ref 6–12)
PO2 BLDA: 51 MM HG (ref 83–108)
PO2 BLDA: 54.1 MM HG (ref 83–108)
PO2 TEMP ADJ BLD: 51 MM HG (ref 83–108)
PO2 TEMP ADJ BLD: 54.1 MM HG (ref 83–108)
POIKILOCYTOSIS BLD QL SMEAR: ABNORMAL
POIKILOCYTOSIS BLD QL SMEAR: NORMAL
POLYCHROMASIA BLD QL SMEAR: ABNORMAL
POLYCHROMASIA BLD QL SMEAR: NORMAL
POTASSIUM BLD-SCNC: 5.4 MMOL/L (ref 3.5–5.2)
POTASSIUM BLD-SCNC: 5.6 MMOL/L (ref 3.5–5.2)
PROLYMPHOCYTES NFR BLD MANUAL: 0 % (ref 0–0)
PROMYELOCYTES NFR BLD MANUAL: 0 % (ref 0–0)
PROT SERPL-MCNC: 6.2 G/DL (ref 6–8.5)
PROT SERPL-MCNC: 6.4 G/DL (ref 6–8.5)
PROT UR QL STRIP: NEGATIVE
RBC # BLD AUTO: 4.99 10*6/MM3 (ref 3.77–5.28)
RBC # BLD AUTO: 5.13 10*6/MM3 (ref 3.77–5.28)
SAO2 % BLDCOA: 86.1 % (ref 94–99)
SAO2 % BLDCOA: 87.5 % (ref 94–99)
SODIUM BLD-SCNC: 127 MMOL/L (ref 136–145)
SODIUM BLD-SCNC: 128 MMOL/L (ref 136–145)
SP GR UR STRIP: 1.01 (ref 1–1.03)
TROPONIN T SERPL-MCNC: <0.01 NG/ML (ref 0–0.03)
TSH SERPL DL<=0.05 MIU/L-ACNC: 1.26 UIU/ML (ref 0.27–4.2)
UROBILINOGEN UR QL STRIP: ABNORMAL
VARIANT LYMPHS NFR BLD MANUAL: 6 % (ref 0–5)
VENTILATOR MODE: ABNORMAL
VENTILATOR MODE: ABNORMAL
WBC MORPH BLD: NORMAL
WBC MORPH BLD: NORMAL
WBC NRBC COR # BLD: 13.1 10*3/MM3 (ref 3.4–10.8)
WBC NRBC COR # BLD: 14.13 10*3/MM3 (ref 3.4–10.8)

## 2019-09-16 PROCEDURE — 25010000002 FUROSEMIDE PER 20 MG: Performed by: EMERGENCY MEDICINE

## 2019-09-16 PROCEDURE — 94799 UNLISTED PULMONARY SVC/PX: CPT

## 2019-09-16 PROCEDURE — 36600 WITHDRAWAL OF ARTERIAL BLOOD: CPT

## 2019-09-16 PROCEDURE — 51702 INSERT TEMP BLADDER CATH: CPT

## 2019-09-16 PROCEDURE — 84443 ASSAY THYROID STIM HORMONE: CPT | Performed by: EMERGENCY MEDICINE

## 2019-09-16 PROCEDURE — 83935 ASSAY OF URINE OSMOLALITY: CPT | Performed by: FAMILY MEDICINE

## 2019-09-16 PROCEDURE — 94640 AIRWAY INHALATION TREATMENT: CPT

## 2019-09-16 PROCEDURE — 71046 X-RAY EXAM CHEST 2 VIEWS: CPT

## 2019-09-16 PROCEDURE — 93010 ELECTROCARDIOGRAM REPORT: CPT | Performed by: INTERNAL MEDICINE

## 2019-09-16 PROCEDURE — 80053 COMPREHEN METABOLIC PANEL: CPT | Performed by: FAMILY MEDICINE

## 2019-09-16 PROCEDURE — 25010000002 METHYLPREDNISOLONE PER 125 MG: Performed by: EMERGENCY MEDICINE

## 2019-09-16 PROCEDURE — 93005 ELECTROCARDIOGRAM TRACING: CPT | Performed by: EMERGENCY MEDICINE

## 2019-09-16 PROCEDURE — 80307 DRUG TEST PRSMV CHEM ANLYZR: CPT | Performed by: FAMILY MEDICINE

## 2019-09-16 PROCEDURE — 25010000002 CEFTRIAXONE SODIUM-DEXTROSE 1-3.74 GM-%(50ML) RECONSTITUTED SOLUTION: Performed by: FAMILY MEDICINE

## 2019-09-16 PROCEDURE — 83930 ASSAY OF BLOOD OSMOLALITY: CPT | Performed by: FAMILY MEDICINE

## 2019-09-16 PROCEDURE — 83880 ASSAY OF NATRIURETIC PEPTIDE: CPT | Performed by: EMERGENCY MEDICINE

## 2019-09-16 PROCEDURE — 85025 COMPLETE CBC W/AUTO DIFF WBC: CPT | Performed by: EMERGENCY MEDICINE

## 2019-09-16 PROCEDURE — 81003 URINALYSIS AUTO W/O SCOPE: CPT | Performed by: EMERGENCY MEDICINE

## 2019-09-16 PROCEDURE — 84484 ASSAY OF TROPONIN QUANT: CPT | Performed by: EMERGENCY MEDICINE

## 2019-09-16 PROCEDURE — 99284 EMERGENCY DEPT VISIT MOD MDM: CPT | Performed by: EMERGENCY MEDICINE

## 2019-09-16 PROCEDURE — 94660 CPAP INITIATION&MGMT: CPT

## 2019-09-16 PROCEDURE — 85007 BL SMEAR W/DIFF WBC COUNT: CPT | Performed by: FAMILY MEDICINE

## 2019-09-16 PROCEDURE — 80053 COMPREHEN METABOLIC PANEL: CPT | Performed by: EMERGENCY MEDICINE

## 2019-09-16 PROCEDURE — 85007 BL SMEAR W/DIFF WBC COUNT: CPT | Performed by: EMERGENCY MEDICINE

## 2019-09-16 PROCEDURE — 25010000002 ENOXAPARIN PER 10 MG: Performed by: FAMILY MEDICINE

## 2019-09-16 PROCEDURE — 82803 BLOOD GASES ANY COMBINATION: CPT

## 2019-09-16 PROCEDURE — 99285 EMERGENCY DEPT VISIT HI MDM: CPT

## 2019-09-16 PROCEDURE — 85025 COMPLETE CBC W/AUTO DIFF WBC: CPT | Performed by: FAMILY MEDICINE

## 2019-09-16 RX ORDER — ACETAMINOPHEN 325 MG/1
650 TABLET ORAL EVERY 4 HOURS PRN
Status: DISCONTINUED | OUTPATIENT
Start: 2019-09-16 | End: 2019-09-26 | Stop reason: HOSPADM

## 2019-09-16 RX ORDER — NICOTINE 21 MG/24HR
1 PATCH, TRANSDERMAL 24 HOURS TRANSDERMAL EVERY 24 HOURS
Status: DISCONTINUED | OUTPATIENT
Start: 2019-09-16 | End: 2019-09-26 | Stop reason: HOSPADM

## 2019-09-16 RX ORDER — ACETAMINOPHEN 650 MG/1
650 SUPPOSITORY RECTAL EVERY 4 HOURS PRN
Status: DISCONTINUED | OUTPATIENT
Start: 2019-09-16 | End: 2019-09-26 | Stop reason: HOSPADM

## 2019-09-16 RX ORDER — ALBUTEROL SULFATE 2.5 MG/3ML
2.5 SOLUTION RESPIRATORY (INHALATION) ONCE
Status: COMPLETED | OUTPATIENT
Start: 2019-09-16 | End: 2019-09-16

## 2019-09-16 RX ORDER — ONDANSETRON 4 MG/1
4 TABLET, FILM COATED ORAL EVERY 6 HOURS PRN
Status: DISCONTINUED | OUTPATIENT
Start: 2019-09-16 | End: 2019-09-26 | Stop reason: HOSPADM

## 2019-09-16 RX ORDER — FUROSEMIDE 10 MG/ML
40 INJECTION INTRAMUSCULAR; INTRAVENOUS EVERY 8 HOURS
Status: DISCONTINUED | OUTPATIENT
Start: 2019-09-16 | End: 2019-09-16

## 2019-09-16 RX ORDER — FUROSEMIDE 10 MG/ML
40 INJECTION INTRAMUSCULAR; INTRAVENOUS EVERY 8 HOURS
Status: DISCONTINUED | OUTPATIENT
Start: 2019-09-17 | End: 2019-09-22

## 2019-09-16 RX ORDER — CEFTRIAXONE 1 G/50ML
1 INJECTION, SOLUTION INTRAVENOUS EVERY 24 HOURS
Status: DISCONTINUED | OUTPATIENT
Start: 2019-09-16 | End: 2019-09-20

## 2019-09-16 RX ORDER — ARFORMOTEROL TARTRATE 15 UG/2ML
15 SOLUTION RESPIRATORY (INHALATION)
Status: DISCONTINUED | OUTPATIENT
Start: 2019-09-16 | End: 2019-09-19

## 2019-09-16 RX ORDER — CALCIUM CARBONATE 200(500)MG
1 TABLET,CHEWABLE ORAL 2 TIMES DAILY PRN
Status: DISCONTINUED | OUTPATIENT
Start: 2019-09-16 | End: 2019-09-26 | Stop reason: HOSPADM

## 2019-09-16 RX ORDER — NICOTINE 21 MG/24HR
1 PATCH, TRANSDERMAL 24 HOURS TRANSDERMAL
Status: DISCONTINUED | OUTPATIENT
Start: 2019-09-17 | End: 2019-09-16

## 2019-09-16 RX ORDER — BUDESONIDE 0.5 MG/2ML
0.5 INHALANT ORAL
Status: DISCONTINUED | OUTPATIENT
Start: 2019-09-16 | End: 2019-09-26 | Stop reason: HOSPADM

## 2019-09-16 RX ORDER — BISACODYL 5 MG/1
5 TABLET, DELAYED RELEASE ORAL DAILY PRN
Status: DISCONTINUED | OUTPATIENT
Start: 2019-09-16 | End: 2019-09-26 | Stop reason: HOSPADM

## 2019-09-16 RX ORDER — SODIUM CHLORIDE 0.9 % (FLUSH) 0.9 %
1-10 SYRINGE (ML) INJECTION AS NEEDED
Status: DISCONTINUED | OUTPATIENT
Start: 2019-09-16 | End: 2019-09-26 | Stop reason: HOSPADM

## 2019-09-16 RX ORDER — RISPERIDONE 2 MG/1
2 TABLET ORAL 2 TIMES DAILY
COMMUNITY

## 2019-09-16 RX ORDER — ACETAMINOPHEN 160 MG/5ML
650 SOLUTION ORAL EVERY 4 HOURS PRN
Status: DISCONTINUED | OUTPATIENT
Start: 2019-09-16 | End: 2019-09-26 | Stop reason: HOSPADM

## 2019-09-16 RX ORDER — IPRATROPIUM BROMIDE AND ALBUTEROL SULFATE 2.5; .5 MG/3ML; MG/3ML
3 SOLUTION RESPIRATORY (INHALATION)
Status: DISCONTINUED | OUTPATIENT
Start: 2019-09-16 | End: 2019-09-24

## 2019-09-16 RX ORDER — SODIUM CHLORIDE 0.9 % (FLUSH) 0.9 %
10 SYRINGE (ML) INJECTION EVERY 12 HOURS SCHEDULED
Status: DISCONTINUED | OUTPATIENT
Start: 2019-09-16 | End: 2019-09-26 | Stop reason: HOSPADM

## 2019-09-16 RX ORDER — NITROGLYCERIN 0.4 MG/1
0.4 TABLET SUBLINGUAL
Status: DISCONTINUED | OUTPATIENT
Start: 2019-09-16 | End: 2019-09-26 | Stop reason: HOSPADM

## 2019-09-16 RX ORDER — METHYLPREDNISOLONE SODIUM SUCCINATE 125 MG/2ML
125 INJECTION, POWDER, LYOPHILIZED, FOR SOLUTION INTRAMUSCULAR; INTRAVENOUS ONCE
Status: COMPLETED | OUTPATIENT
Start: 2019-09-16 | End: 2019-09-16

## 2019-09-16 RX ORDER — NICOTINE 21 MG/24HR
PATCH, TRANSDERMAL 24 HOURS TRANSDERMAL
Status: COMPLETED
Start: 2019-09-16 | End: 2019-09-17

## 2019-09-16 RX ORDER — IPRATROPIUM BROMIDE AND ALBUTEROL SULFATE 2.5; .5 MG/3ML; MG/3ML
3 SOLUTION RESPIRATORY (INHALATION) ONCE
Status: COMPLETED | OUTPATIENT
Start: 2019-09-16 | End: 2019-09-16

## 2019-09-16 RX ORDER — SODIUM CHLORIDE 9 MG/ML
40 INJECTION, SOLUTION INTRAVENOUS AS NEEDED
Status: DISCONTINUED | OUTPATIENT
Start: 2019-09-16 | End: 2019-09-26 | Stop reason: HOSPADM

## 2019-09-16 RX ORDER — FUROSEMIDE 10 MG/ML
60 INJECTION INTRAMUSCULAR; INTRAVENOUS ONCE
Status: COMPLETED | OUTPATIENT
Start: 2019-09-16 | End: 2019-09-16

## 2019-09-16 RX ORDER — ONDANSETRON 2 MG/ML
4 INJECTION INTRAMUSCULAR; INTRAVENOUS EVERY 6 HOURS PRN
Status: DISCONTINUED | OUTPATIENT
Start: 2019-09-16 | End: 2019-09-26 | Stop reason: HOSPADM

## 2019-09-16 RX ADMIN — NICOTINE 1 PATCH: 21 PATCH TRANSDERMAL at 22:56

## 2019-09-16 RX ADMIN — FUROSEMIDE 60 MG: 10 INJECTION, SOLUTION INTRAVENOUS at 16:46

## 2019-09-16 RX ADMIN — SODIUM CHLORIDE, PRESERVATIVE FREE 10 ML: 5 INJECTION INTRAVENOUS at 21:05

## 2019-09-16 RX ADMIN — IPRATROPIUM BROMIDE AND ALBUTEROL SULFATE 3 ML: .5; 3 SOLUTION RESPIRATORY (INHALATION) at 23:40

## 2019-09-16 RX ADMIN — CEFTRIAXONE 1 G: 1 INJECTION, SOLUTION INTRAVENOUS at 21:04

## 2019-09-16 RX ADMIN — ARFORMOTEROL TARTRATE 15 MCG: 15 SOLUTION RESPIRATORY (INHALATION) at 22:10

## 2019-09-16 RX ADMIN — METHYLPREDNISOLONE SODIUM SUCCINATE 125 MG: 125 INJECTION, POWDER, FOR SOLUTION INTRAMUSCULAR; INTRAVENOUS at 15:13

## 2019-09-16 RX ADMIN — ALBUTEROL SULFATE 2.5 MG: 2.5 SOLUTION RESPIRATORY (INHALATION) at 14:14

## 2019-09-16 RX ADMIN — BUDESONIDE 0.5 MG: 0.5 SUSPENSION RESPIRATORY (INHALATION) at 22:10

## 2019-09-16 RX ADMIN — IPRATROPIUM BROMIDE AND ALBUTEROL SULFATE 3 ML: .5; 3 SOLUTION RESPIRATORY (INHALATION) at 17:41

## 2019-09-16 RX ADMIN — ENOXAPARIN SODIUM 40 MG: 40 INJECTION SUBCUTANEOUS at 21:04

## 2019-09-17 LAB
ANION GAP SERPL CALCULATED.3IONS-SCNC: 9.7 MMOL/L (ref 5–15)
ARTERIAL PATENCY WRIST A: ABNORMAL
ARTERIAL PATENCY WRIST A: ABNORMAL
ARTERIAL PATENCY WRIST A: POSITIVE
ATMOSPHERIC PRESS: 740 MMHG
ATMOSPHERIC PRESS: 740 MMHG
ATMOSPHERIC PRESS: 741 MMHG
B PARAPERT DNA SPEC QL NAA+PROBE: NOT DETECTED
B PERT DNA SPEC QL NAA+PROBE: NOT DETECTED
BASE EXCESS BLDA CALC-SCNC: 10 MMOL/L (ref 0–2)
BASE EXCESS BLDA CALC-SCNC: 9.2 MMOL/L (ref 0–2)
BASE EXCESS BLDA CALC-SCNC: 9.4 MMOL/L (ref 0–2)
BDY SITE: ABNORMAL
BODY TEMPERATURE: 98.6 C
BUN BLD-MCNC: 19 MG/DL (ref 6–20)
BUN/CREAT SERPL: 21.8 (ref 7–25)
C PNEUM DNA NPH QL NAA+NON-PROBE: NOT DETECTED
CALCIUM SPEC-SCNC: 9.4 MG/DL (ref 8.6–10.5)
CHLORIDE SERPL-SCNC: 87 MMOL/L (ref 98–107)
CO2 SERPL-SCNC: 33.3 MMOL/L (ref 22–29)
CREAT BLD-MCNC: 0.87 MG/DL (ref 0.57–1)
D DIMER PPP FEU-MCNC: 0.53 MCGFEU/ML (ref 0–0.46)
DEPRECATED RDW RBC AUTO: 60.6 FL (ref 37–54)
EPAP: 12
EPAP: 16
ERYTHROCYTE [DISTWIDTH] IN BLOOD BY AUTOMATED COUNT: 18.3 % (ref 12.3–15.4)
FLUAV H1 2009 PAND RNA NPH QL NAA+PROBE: NOT DETECTED
FLUAV H1 HA GENE NPH QL NAA+PROBE: NOT DETECTED
FLUAV H3 RNA NPH QL NAA+PROBE: NOT DETECTED
FLUAV SUBTYP SPEC NAA+PROBE: NOT DETECTED
FLUBV RNA ISLT QL NAA+PROBE: NOT DETECTED
GAS FLOW AIRWAY: 0 LPM
GAS FLOW AIRWAY: 70 LPM
GFR SERPL CREATININE-BSD FRML MDRD: 69 ML/MIN/1.73
GLUCOSE BLD-MCNC: 94 MG/DL (ref 65–99)
GLUCOSE BLDC GLUCOMTR-MCNC: 104 MG/DL (ref 70–130)
GLUCOSE BLDC GLUCOMTR-MCNC: 93 MG/DL (ref 70–130)
GLUCOSE BLDC GLUCOMTR-MCNC: 93 MG/DL (ref 70–130)
HADV DNA SPEC NAA+PROBE: NOT DETECTED
HCO3 BLDA-SCNC: 36.7 MMOL/L (ref 20–26)
HCO3 BLDA-SCNC: 36.7 MMOL/L (ref 20–26)
HCO3 BLDA-SCNC: 38 MMOL/L (ref 20–26)
HCOV 229E RNA SPEC QL NAA+PROBE: NOT DETECTED
HCOV HKU1 RNA SPEC QL NAA+PROBE: NOT DETECTED
HCOV NL63 RNA SPEC QL NAA+PROBE: NOT DETECTED
HCOV OC43 RNA SPEC QL NAA+PROBE: NOT DETECTED
HCT VFR BLD AUTO: 36.8 % (ref 34–46.6)
HGB BLD-MCNC: 11.6 G/DL (ref 12–15.9)
HGB BLDA-MCNC: 13.2 G/DL (ref 13.5–17.5)
HGB BLDA-MCNC: 13.4 G/DL (ref 13.5–17.5)
HGB BLDA-MCNC: 13.6 G/DL (ref 13.5–17.5)
HMPV RNA NPH QL NAA+NON-PROBE: NOT DETECTED
HOROWITZ INDEX BLD+IHG-RTO: 100 %
HOROWITZ INDEX BLD+IHG-RTO: 100 %
HOROWITZ INDEX BLD+IHG-RTO: 70 %
HPIV1 RNA SPEC QL NAA+PROBE: NOT DETECTED
HPIV2 RNA SPEC QL NAA+PROBE: NOT DETECTED
HPIV3 RNA NPH QL NAA+PROBE: NOT DETECTED
HPIV4 P GENE NPH QL NAA+PROBE: NOT DETECTED
IPAP: 22
Lab: ABNORMAL
M PNEUMO IGG SER IA-ACNC: NOT DETECTED
MCH RBC QN AUTO: 28.6 PG (ref 26.6–33)
MCHC RBC AUTO-ENTMCNC: 31.5 G/DL (ref 31.5–35.7)
MCV RBC AUTO: 90.6 FL (ref 79–97)
MODALITY: ABNORMAL
NOTIFIED BY: ABNORMAL
NOTIFIED WHO: ABNORMAL
OSMOLALITY SERPL: 280 MOSM/KG (ref 275–300)
PAW @ PEAK INSP FLOW SETTING VENT: 23 CMH2O
PAW @ PEAK INSP FLOW SETTING VENT: 29 CMH2O
PCO2 BLDA: 61.4 MM HG (ref 35–45)
PCO2 BLDA: 62.1 MM HG (ref 35–45)
PCO2 BLDA: 66.4 MM HG (ref 35–45)
PCO2 TEMP ADJ BLD: 61.4 MM HG (ref 35–45)
PCO2 TEMP ADJ BLD: 62.1 MM HG (ref 35–45)
PCO2 TEMP ADJ BLD: 66.4 MM HG (ref 35–45)
PH BLDA: 7.37 PH UNITS (ref 7.35–7.45)
PH BLDA: 7.38 PH UNITS (ref 7.35–7.45)
PH BLDA: 7.38 PH UNITS (ref 7.35–7.45)
PH, TEMP CORRECTED: 7.37 PH UNITS (ref 7.35–7.45)
PH, TEMP CORRECTED: 7.38 PH UNITS (ref 7.35–7.45)
PH, TEMP CORRECTED: 7.38 PH UNITS (ref 7.35–7.45)
PLATELET # BLD AUTO: 152 10*3/MM3 (ref 140–450)
PMV BLD AUTO: 9.6 FL (ref 6–12)
PO2 BLDA: 51 MM HG (ref 83–108)
PO2 BLDA: 82.1 MM HG (ref 83–108)
PO2 BLDA: 85.1 MM HG (ref 83–108)
PO2 TEMP ADJ BLD: 51 MM HG (ref 83–108)
PO2 TEMP ADJ BLD: 82.1 MM HG (ref 83–108)
PO2 TEMP ADJ BLD: 85.1 MM HG (ref 83–108)
POTASSIUM BLD-SCNC: 5.5 MMOL/L (ref 3.5–5.2)
RBC # BLD AUTO: 4.06 10*6/MM3 (ref 3.77–5.28)
RHINOVIRUS RNA SPEC NAA+PROBE: NOT DETECTED
RSV RNA NPH QL NAA+NON-PROBE: NOT DETECTED
SAO2 % BLDCOA: 85.1 % (ref 94–99)
SAO2 % BLDCOA: 96 % (ref 94–99)
SAO2 % BLDCOA: 96.6 % (ref 94–99)
SET MECH RESP RATE: 16
SET MECH RESP RATE: 16
SODIUM BLD-SCNC: 130 MMOL/L (ref 136–145)
VALPROATE SERPL-MCNC: 49.8 MCG/ML (ref 50–125)
VENTILATOR MODE: ABNORMAL
VT ON VENT VENT: 419 ML
VT ON VENT VENT: 535 ML
WBC NRBC COR # BLD: 8.93 10*3/MM3 (ref 3.4–10.8)

## 2019-09-17 PROCEDURE — 36600 WITHDRAWAL OF ARTERIAL BLOOD: CPT

## 2019-09-17 PROCEDURE — 82803 BLOOD GASES ANY COMBINATION: CPT

## 2019-09-17 PROCEDURE — 94799 UNLISTED PULMONARY SVC/PX: CPT

## 2019-09-17 PROCEDURE — 80048 BASIC METABOLIC PNL TOTAL CA: CPT | Performed by: FAMILY MEDICINE

## 2019-09-17 PROCEDURE — 80164 ASSAY DIPROPYLACETIC ACD TOT: CPT | Performed by: FAMILY MEDICINE

## 2019-09-17 PROCEDURE — 0100U HC BIOFIRE FILMARRAY RESP PANEL 2: CPT | Performed by: FAMILY MEDICINE

## 2019-09-17 PROCEDURE — 82962 GLUCOSE BLOOD TEST: CPT

## 2019-09-17 PROCEDURE — 25010000002 ENOXAPARIN PER 10 MG: Performed by: FAMILY MEDICINE

## 2019-09-17 PROCEDURE — 85027 COMPLETE CBC AUTOMATED: CPT | Performed by: FAMILY MEDICINE

## 2019-09-17 PROCEDURE — 25010000002 FUROSEMIDE PER 20 MG: Performed by: FAMILY MEDICINE

## 2019-09-17 PROCEDURE — 25010000002 CEFTRIAXONE SODIUM-DEXTROSE 1-3.74 GM-%(50ML) RECONSTITUTED SOLUTION: Performed by: FAMILY MEDICINE

## 2019-09-17 PROCEDURE — 85379 FIBRIN DEGRADATION QUANT: CPT | Performed by: FAMILY MEDICINE

## 2019-09-17 RX ORDER — DIVALPROEX SODIUM 500 MG/1
500 TABLET, EXTENDED RELEASE ORAL 2 TIMES DAILY
Status: DISCONTINUED | OUTPATIENT
Start: 2019-09-17 | End: 2019-09-26 | Stop reason: HOSPADM

## 2019-09-17 RX ORDER — NALOXONE HYDROCHLORIDE 1 MG/ML
1 INJECTION INTRAMUSCULAR; INTRAVENOUS; SUBCUTANEOUS ONCE
Status: COMPLETED | OUTPATIENT
Start: 2019-09-17 | End: 2019-09-17

## 2019-09-17 RX ORDER — NALOXONE HYDROCHLORIDE 1 MG/ML
INJECTION INTRAMUSCULAR; INTRAVENOUS; SUBCUTANEOUS
Status: COMPLETED
Start: 2019-09-17 | End: 2019-09-17

## 2019-09-17 RX ORDER — DEXTROSE MONOHYDRATE 25 G/50ML
25 INJECTION, SOLUTION INTRAVENOUS
Status: DISCONTINUED | OUTPATIENT
Start: 2019-09-17 | End: 2019-09-26 | Stop reason: HOSPADM

## 2019-09-17 RX ORDER — NICOTINE POLACRILEX 4 MG
15 LOZENGE BUCCAL
Status: DISCONTINUED | OUTPATIENT
Start: 2019-09-17 | End: 2019-09-26 | Stop reason: HOSPADM

## 2019-09-17 RX ORDER — RISPERIDONE 1 MG/1
2 TABLET ORAL EVERY 12 HOURS SCHEDULED
Status: DISCONTINUED | OUTPATIENT
Start: 2019-09-17 | End: 2019-09-26 | Stop reason: HOSPADM

## 2019-09-17 RX ADMIN — NICOTINE 1 PATCH: 21 PATCH TRANSDERMAL at 22:15

## 2019-09-17 RX ADMIN — RISPERIDONE 2 MG: 1 TABLET ORAL at 20:24

## 2019-09-17 RX ADMIN — NALOXONE HYDROCHLORIDE 1 MG: 1 INJECTION INTRAMUSCULAR; INTRAVENOUS; SUBCUTANEOUS at 17:24

## 2019-09-17 RX ADMIN — FUROSEMIDE 40 MG: 10 INJECTION, SOLUTION INTRAMUSCULAR; INTRAVENOUS at 15:13

## 2019-09-17 RX ADMIN — RISPERIDONE 2 MG: 1 TABLET ORAL at 11:07

## 2019-09-17 RX ADMIN — SODIUM CHLORIDE, PRESERVATIVE FREE 10 ML: 5 INJECTION INTRAVENOUS at 20:25

## 2019-09-17 RX ADMIN — SODIUM CHLORIDE, PRESERVATIVE FREE 10 ML: 5 INJECTION INTRAVENOUS at 08:10

## 2019-09-17 RX ADMIN — DIVALPROEX SODIUM 500 MG: 500 TABLET, EXTENDED RELEASE ORAL at 20:24

## 2019-09-17 RX ADMIN — IPRATROPIUM BROMIDE AND ALBUTEROL SULFATE 3 ML: .5; 3 SOLUTION RESPIRATORY (INHALATION) at 06:53

## 2019-09-17 RX ADMIN — NALOXONE HYDROCHLORIDE 1 MG: 1 INJECTION PARENTERAL at 17:24

## 2019-09-17 RX ADMIN — IPRATROPIUM BROMIDE AND ALBUTEROL SULFATE 3 ML: .5; 3 SOLUTION RESPIRATORY (INHALATION) at 14:52

## 2019-09-17 RX ADMIN — FUROSEMIDE 40 MG: 10 INJECTION, SOLUTION INTRAMUSCULAR; INTRAVENOUS at 06:08

## 2019-09-17 RX ADMIN — ARFORMOTEROL TARTRATE 15 MCG: 15 SOLUTION RESPIRATORY (INHALATION) at 20:59

## 2019-09-17 RX ADMIN — ARFORMOTEROL TARTRATE 15 MCG: 15 SOLUTION RESPIRATORY (INHALATION) at 09:01

## 2019-09-17 RX ADMIN — IPRATROPIUM BROMIDE AND ALBUTEROL SULFATE 3 ML: .5; 3 SOLUTION RESPIRATORY (INHALATION) at 18:51

## 2019-09-17 RX ADMIN — ENOXAPARIN SODIUM 40 MG: 40 INJECTION SUBCUTANEOUS at 17:27

## 2019-09-17 RX ADMIN — IPRATROPIUM BROMIDE AND ALBUTEROL SULFATE 3 ML: .5; 3 SOLUTION RESPIRATORY (INHALATION) at 03:12

## 2019-09-17 RX ADMIN — IPRATROPIUM BROMIDE AND ALBUTEROL SULFATE 3 ML: .5; 3 SOLUTION RESPIRATORY (INHALATION) at 23:16

## 2019-09-17 RX ADMIN — BUDESONIDE 0.5 MG: 0.5 SUSPENSION RESPIRATORY (INHALATION) at 06:53

## 2019-09-17 RX ADMIN — CEFTRIAXONE 1 G: 1 INJECTION, SOLUTION INTRAVENOUS at 20:24

## 2019-09-17 RX ADMIN — BUDESONIDE 0.5 MG: 0.5 SUSPENSION RESPIRATORY (INHALATION) at 18:51

## 2019-09-17 RX ADMIN — IPRATROPIUM BROMIDE AND ALBUTEROL SULFATE 3 ML: .5; 3 SOLUTION RESPIRATORY (INHALATION) at 10:28

## 2019-09-17 RX ADMIN — FUROSEMIDE 40 MG: 10 INJECTION, SOLUTION INTRAMUSCULAR; INTRAVENOUS at 21:25

## 2019-09-17 RX ADMIN — DIVALPROEX SODIUM 500 MG: 500 TABLET, EXTENDED RELEASE ORAL at 15:13

## 2019-09-18 ENCOUNTER — APPOINTMENT (OUTPATIENT)
Dept: ULTRASOUND IMAGING | Facility: HOSPITAL | Age: 49
End: 2019-09-18

## 2019-09-18 ENCOUNTER — APPOINTMENT (OUTPATIENT)
Dept: CT IMAGING | Facility: HOSPITAL | Age: 49
End: 2019-09-18

## 2019-09-18 LAB
ANION GAP SERPL CALCULATED.3IONS-SCNC: 10 MMOL/L (ref 5–15)
BUN BLD-MCNC: 17 MG/DL (ref 6–20)
BUN/CREAT SERPL: 24.3 (ref 7–25)
CALCIUM SPEC-SCNC: 8.5 MG/DL (ref 8.6–10.5)
CHLORIDE SERPL-SCNC: 91 MMOL/L (ref 98–107)
CO2 SERPL-SCNC: 35 MMOL/L (ref 22–29)
CREAT BLD-MCNC: 0.7 MG/DL (ref 0.57–1)
DEPRECATED RDW RBC AUTO: 62.5 FL (ref 37–54)
ERYTHROCYTE [DISTWIDTH] IN BLOOD BY AUTOMATED COUNT: 18.9 % (ref 12.3–15.4)
GFR SERPL CREATININE-BSD FRML MDRD: 88 ML/MIN/1.73
GLUCOSE BLD-MCNC: 72 MG/DL (ref 65–99)
GLUCOSE BLDC GLUCOMTR-MCNC: 81 MG/DL (ref 70–130)
GLUCOSE BLDC GLUCOMTR-MCNC: 82 MG/DL (ref 70–130)
GLUCOSE BLDC GLUCOMTR-MCNC: 85 MG/DL (ref 70–130)
HCT VFR BLD AUTO: 39.2 % (ref 34–46.6)
HGB BLD-MCNC: 12.3 G/DL (ref 12–15.9)
MCH RBC QN AUTO: 28.7 PG (ref 26.6–33)
MCHC RBC AUTO-ENTMCNC: 31.4 G/DL (ref 31.5–35.7)
MCV RBC AUTO: 91.4 FL (ref 79–97)
PLATELET # BLD AUTO: 166 10*3/MM3 (ref 140–450)
PMV BLD AUTO: 9.8 FL (ref 6–12)
POTASSIUM BLD-SCNC: 3.9 MMOL/L (ref 3.5–5.2)
RBC # BLD AUTO: 4.29 10*6/MM3 (ref 3.77–5.28)
SODIUM BLD-SCNC: 136 MMOL/L (ref 136–145)
WBC NRBC COR # BLD: 10.72 10*3/MM3 (ref 3.4–10.8)

## 2019-09-18 PROCEDURE — 71275 CT ANGIOGRAPHY CHEST: CPT

## 2019-09-18 PROCEDURE — 25010000002 CEFTRIAXONE SODIUM-DEXTROSE 1-3.74 GM-%(50ML) RECONSTITUTED SOLUTION: Performed by: FAMILY MEDICINE

## 2019-09-18 PROCEDURE — 80048 BASIC METABOLIC PNL TOTAL CA: CPT | Performed by: FAMILY MEDICINE

## 2019-09-18 PROCEDURE — 0 IOPAMIDOL PER 1 ML: Performed by: FAMILY MEDICINE

## 2019-09-18 PROCEDURE — 94799 UNLISTED PULMONARY SVC/PX: CPT

## 2019-09-18 PROCEDURE — 25010000002 ENOXAPARIN PER 10 MG: Performed by: FAMILY MEDICINE

## 2019-09-18 PROCEDURE — 85027 COMPLETE CBC AUTOMATED: CPT | Performed by: FAMILY MEDICINE

## 2019-09-18 PROCEDURE — 94667 MNPJ CHEST WALL 1ST: CPT

## 2019-09-18 PROCEDURE — 82962 GLUCOSE BLOOD TEST: CPT

## 2019-09-18 PROCEDURE — 93970 EXTREMITY STUDY: CPT

## 2019-09-18 PROCEDURE — 25010000002 FUROSEMIDE PER 20 MG: Performed by: FAMILY MEDICINE

## 2019-09-18 RX ORDER — DIVALPROEX SODIUM 500 MG/1
1000 TABLET, EXTENDED RELEASE ORAL DAILY
COMMUNITY
End: 2019-09-26 | Stop reason: HOSPADM

## 2019-09-18 RX ORDER — SODIUM CHLORIDE FOR INHALATION 7 %
4 VIAL, NEBULIZER (ML) INHALATION
Status: DISCONTINUED | OUTPATIENT
Start: 2019-09-18 | End: 2019-09-26 | Stop reason: HOSPADM

## 2019-09-18 RX ADMIN — BUDESONIDE 0.5 MG: 0.5 SUSPENSION RESPIRATORY (INHALATION) at 07:12

## 2019-09-18 RX ADMIN — DIVALPROEX SODIUM 500 MG: 500 TABLET, EXTENDED RELEASE ORAL at 21:19

## 2019-09-18 RX ADMIN — DIVALPROEX SODIUM 500 MG: 500 TABLET, EXTENDED RELEASE ORAL at 09:53

## 2019-09-18 RX ADMIN — RISPERIDONE 2 MG: 1 TABLET ORAL at 09:53

## 2019-09-18 RX ADMIN — SODIUM CHLORIDE SOLN NEBU 7% 4 ML: 7 NEBU SOLN at 20:33

## 2019-09-18 RX ADMIN — ENOXAPARIN SODIUM 40 MG: 40 INJECTION SUBCUTANEOUS at 18:14

## 2019-09-18 RX ADMIN — ACETAMINOPHEN 650 MG: 325 TABLET ORAL at 05:44

## 2019-09-18 RX ADMIN — FUROSEMIDE 40 MG: 10 INJECTION, SOLUTION INTRAMUSCULAR; INTRAVENOUS at 21:20

## 2019-09-18 RX ADMIN — IOPAMIDOL 130 ML: 755 INJECTION, SOLUTION INTRAVENOUS at 09:00

## 2019-09-18 RX ADMIN — SODIUM CHLORIDE, PRESERVATIVE FREE 10 ML: 5 INJECTION INTRAVENOUS at 21:20

## 2019-09-18 RX ADMIN — IPRATROPIUM BROMIDE AND ALBUTEROL SULFATE 3 ML: .5; 3 SOLUTION RESPIRATORY (INHALATION) at 15:53

## 2019-09-18 RX ADMIN — IPRATROPIUM BROMIDE AND ALBUTEROL SULFATE 3 ML: .5; 3 SOLUTION RESPIRATORY (INHALATION) at 20:24

## 2019-09-18 RX ADMIN — ARFORMOTEROL TARTRATE 15 MCG: 15 SOLUTION RESPIRATORY (INHALATION) at 20:42

## 2019-09-18 RX ADMIN — ARFORMOTEROL TARTRATE 15 MCG: 15 SOLUTION RESPIRATORY (INHALATION) at 07:24

## 2019-09-18 RX ADMIN — FUROSEMIDE 40 MG: 10 INJECTION, SOLUTION INTRAMUSCULAR; INTRAVENOUS at 13:17

## 2019-09-18 RX ADMIN — ENOXAPARIN SODIUM 40 MG: 40 INJECTION SUBCUTANEOUS at 05:45

## 2019-09-18 RX ADMIN — ACETAMINOPHEN 650 MG: 325 TABLET ORAL at 23:25

## 2019-09-18 RX ADMIN — ACETAMINOPHEN 650 MG: 325 TABLET ORAL at 15:25

## 2019-09-18 RX ADMIN — BISACODYL 5 MG: 5 TABLET, COATED ORAL at 15:25

## 2019-09-18 RX ADMIN — SODIUM CHLORIDE, PRESERVATIVE FREE 10 ML: 5 INJECTION INTRAVENOUS at 09:53

## 2019-09-18 RX ADMIN — FUROSEMIDE 40 MG: 10 INJECTION, SOLUTION INTRAMUSCULAR; INTRAVENOUS at 05:45

## 2019-09-18 RX ADMIN — NICOTINE 1 PATCH: 21 PATCH TRANSDERMAL at 23:25

## 2019-09-18 RX ADMIN — IPRATROPIUM BROMIDE AND ALBUTEROL SULFATE 3 ML: .5; 3 SOLUTION RESPIRATORY (INHALATION) at 07:12

## 2019-09-18 RX ADMIN — RISPERIDONE 2 MG: 1 TABLET ORAL at 21:19

## 2019-09-18 RX ADMIN — IPRATROPIUM BROMIDE AND ALBUTEROL SULFATE 3 ML: .5; 3 SOLUTION RESPIRATORY (INHALATION) at 03:00

## 2019-09-18 RX ADMIN — IPRATROPIUM BROMIDE AND ALBUTEROL SULFATE 3 ML: .5; 3 SOLUTION RESPIRATORY (INHALATION) at 10:50

## 2019-09-18 RX ADMIN — CEFTRIAXONE 1 G: 1 INJECTION, SOLUTION INTRAVENOUS at 19:04

## 2019-09-18 RX ADMIN — IPRATROPIUM BROMIDE AND ALBUTEROL SULFATE 3 ML: .5; 3 SOLUTION RESPIRATORY (INHALATION) at 23:24

## 2019-09-18 RX ADMIN — BUDESONIDE 0.5 MG: 0.5 SUSPENSION RESPIRATORY (INHALATION) at 20:24

## 2019-09-19 LAB
ALBUMIN SERPL-MCNC: 3 G/DL (ref 3.5–5.2)
ALBUMIN/GLOB SERPL: 1.2 G/DL
ALP SERPL-CCNC: 57 U/L (ref 39–117)
ALT SERPL W P-5'-P-CCNC: 7 U/L (ref 1–33)
ANION GAP SERPL CALCULATED.3IONS-SCNC: 7.1 MMOL/L (ref 5–15)
AST SERPL-CCNC: 9 U/L (ref 1–32)
BILIRUB SERPL-MCNC: 0.5 MG/DL (ref 0.2–1.2)
BUN BLD-MCNC: 15 MG/DL (ref 6–20)
BUN/CREAT SERPL: 25.4 (ref 7–25)
CALCIUM SPEC-SCNC: 9.1 MG/DL (ref 8.6–10.5)
CHLORIDE SERPL-SCNC: 92 MMOL/L (ref 98–107)
CO2 SERPL-SCNC: 39.9 MMOL/L (ref 22–29)
CREAT BLD-MCNC: 0.59 MG/DL (ref 0.57–1)
DEPRECATED RDW RBC AUTO: 59.5 FL (ref 37–54)
ERYTHROCYTE [DISTWIDTH] IN BLOOD BY AUTOMATED COUNT: 18.3 % (ref 12.3–15.4)
GFR SERPL CREATININE-BSD FRML MDRD: 107 ML/MIN/1.73
GLOBULIN UR ELPH-MCNC: 2.5 GM/DL
GLUCOSE BLD-MCNC: 74 MG/DL (ref 65–99)
GLUCOSE BLDC GLUCOMTR-MCNC: 100 MG/DL (ref 70–130)
GLUCOSE BLDC GLUCOMTR-MCNC: 102 MG/DL (ref 70–130)
GLUCOSE BLDC GLUCOMTR-MCNC: 78 MG/DL (ref 70–130)
GLUCOSE BLDC GLUCOMTR-MCNC: 99 MG/DL (ref 70–130)
HCT VFR BLD AUTO: 37.4 % (ref 34–46.6)
HGB BLD-MCNC: 11.8 G/DL (ref 12–15.9)
MCH RBC QN AUTO: 28.5 PG (ref 26.6–33)
MCHC RBC AUTO-ENTMCNC: 31.6 G/DL (ref 31.5–35.7)
MCV RBC AUTO: 90.3 FL (ref 79–97)
PLATELET # BLD AUTO: 148 10*3/MM3 (ref 140–450)
PMV BLD AUTO: 8.9 FL (ref 6–12)
POTASSIUM BLD-SCNC: 3.5 MMOL/L (ref 3.5–5.2)
POTASSIUM BLD-SCNC: 4.1 MMOL/L (ref 3.5–5.2)
PROT SERPL-MCNC: 5.5 G/DL (ref 6–8.5)
RBC # BLD AUTO: 4.14 10*6/MM3 (ref 3.77–5.28)
SODIUM BLD-SCNC: 139 MMOL/L (ref 136–145)
WBC NRBC COR # BLD: 7.94 10*3/MM3 (ref 3.4–10.8)

## 2019-09-19 PROCEDURE — 84132 ASSAY OF SERUM POTASSIUM: CPT | Performed by: INTERNAL MEDICINE

## 2019-09-19 PROCEDURE — 25010000002 ENOXAPARIN PER 10 MG: Performed by: FAMILY MEDICINE

## 2019-09-19 PROCEDURE — 97166 OT EVAL MOD COMPLEX 45 MIN: CPT

## 2019-09-19 PROCEDURE — 85027 COMPLETE CBC AUTOMATED: CPT | Performed by: FAMILY MEDICINE

## 2019-09-19 PROCEDURE — 25010000002 CEFTRIAXONE SODIUM-DEXTROSE 1-3.74 GM-%(50ML) RECONSTITUTED SOLUTION: Performed by: FAMILY MEDICINE

## 2019-09-19 PROCEDURE — 94660 CPAP INITIATION&MGMT: CPT

## 2019-09-19 PROCEDURE — 94799 UNLISTED PULMONARY SVC/PX: CPT

## 2019-09-19 PROCEDURE — 25010000002 FUROSEMIDE PER 20 MG: Performed by: FAMILY MEDICINE

## 2019-09-19 PROCEDURE — 94668 MNPJ CHEST WALL SBSQ: CPT

## 2019-09-19 PROCEDURE — 97162 PT EVAL MOD COMPLEX 30 MIN: CPT

## 2019-09-19 PROCEDURE — 82962 GLUCOSE BLOOD TEST: CPT

## 2019-09-19 PROCEDURE — 80053 COMPREHEN METABOLIC PANEL: CPT | Performed by: FAMILY MEDICINE

## 2019-09-19 RX ORDER — POTASSIUM CHLORIDE 20 MEQ/1
40 TABLET, EXTENDED RELEASE ORAL AS NEEDED
Status: DISCONTINUED | OUTPATIENT
Start: 2019-09-19 | End: 2019-09-22

## 2019-09-19 RX ORDER — POTASSIUM CHLORIDE 7.45 MG/ML
10 INJECTION INTRAVENOUS
Status: DISCONTINUED | OUTPATIENT
Start: 2019-09-19 | End: 2019-09-22

## 2019-09-19 RX ORDER — POTASSIUM CHLORIDE 1.5 G/1.77G
40 POWDER, FOR SOLUTION ORAL AS NEEDED
Status: DISCONTINUED | OUTPATIENT
Start: 2019-09-19 | End: 2019-09-22

## 2019-09-19 RX ORDER — ONDANSETRON 2 MG/ML
4 INJECTION INTRAMUSCULAR; INTRAVENOUS EVERY 6 HOURS PRN
Status: DISCONTINUED | OUTPATIENT
Start: 2019-09-19 | End: 2019-09-26 | Stop reason: HOSPADM

## 2019-09-19 RX ADMIN — IPRATROPIUM BROMIDE AND ALBUTEROL SULFATE 3 ML: .5; 3 SOLUTION RESPIRATORY (INHALATION) at 22:37

## 2019-09-19 RX ADMIN — RISPERIDONE 2 MG: 1 TABLET ORAL at 08:47

## 2019-09-19 RX ADMIN — POTASSIUM CHLORIDE 40 MEQ: 20 TABLET, EXTENDED RELEASE ORAL at 08:48

## 2019-09-19 RX ADMIN — FUROSEMIDE 40 MG: 10 INJECTION, SOLUTION INTRAMUSCULAR; INTRAVENOUS at 14:17

## 2019-09-19 RX ADMIN — BUDESONIDE 0.5 MG: 0.5 SUSPENSION RESPIRATORY (INHALATION) at 19:28

## 2019-09-19 RX ADMIN — IPRATROPIUM BROMIDE AND ALBUTEROL SULFATE 3 ML: .5; 3 SOLUTION RESPIRATORY (INHALATION) at 19:28

## 2019-09-19 RX ADMIN — BUDESONIDE 0.5 MG: 0.5 SUSPENSION RESPIRATORY (INHALATION) at 09:40

## 2019-09-19 RX ADMIN — FUROSEMIDE 40 MG: 10 INJECTION, SOLUTION INTRAMUSCULAR; INTRAVENOUS at 21:42

## 2019-09-19 RX ADMIN — ACETAMINOPHEN 650 MG: 325 TABLET ORAL at 05:51

## 2019-09-19 RX ADMIN — POTASSIUM CHLORIDE 40 MEQ: 20 TABLET, EXTENDED RELEASE ORAL at 12:52

## 2019-09-19 RX ADMIN — IPRATROPIUM BROMIDE AND ALBUTEROL SULFATE 3 ML: .5; 3 SOLUTION RESPIRATORY (INHALATION) at 06:37

## 2019-09-19 RX ADMIN — SODIUM CHLORIDE, PRESERVATIVE FREE 10 ML: 5 INJECTION INTRAVENOUS at 20:42

## 2019-09-19 RX ADMIN — DIVALPROEX SODIUM 500 MG: 500 TABLET, EXTENDED RELEASE ORAL at 08:47

## 2019-09-19 RX ADMIN — ENOXAPARIN SODIUM 40 MG: 40 INJECTION SUBCUTANEOUS at 17:55

## 2019-09-19 RX ADMIN — SODIUM CHLORIDE SOLN NEBU 7% 4 ML: 7 NEBU SOLN at 06:38

## 2019-09-19 RX ADMIN — ENOXAPARIN SODIUM 40 MG: 40 INJECTION SUBCUTANEOUS at 05:50

## 2019-09-19 RX ADMIN — ACETAMINOPHEN 650 MG: 325 TABLET ORAL at 12:52

## 2019-09-19 RX ADMIN — RISPERIDONE 2 MG: 1 TABLET ORAL at 20:41

## 2019-09-19 RX ADMIN — IPRATROPIUM BROMIDE AND ALBUTEROL SULFATE 3 ML: .5; 3 SOLUTION RESPIRATORY (INHALATION) at 03:05

## 2019-09-19 RX ADMIN — ACETAMINOPHEN 650 MG: 325 TABLET ORAL at 17:54

## 2019-09-19 RX ADMIN — FUROSEMIDE 40 MG: 10 INJECTION, SOLUTION INTRAMUSCULAR; INTRAVENOUS at 05:50

## 2019-09-19 RX ADMIN — SODIUM CHLORIDE, PRESERVATIVE FREE 10 ML: 5 INJECTION INTRAVENOUS at 08:48

## 2019-09-19 RX ADMIN — NICOTINE 1 PATCH: 21 PATCH TRANSDERMAL at 22:30

## 2019-09-19 RX ADMIN — IPRATROPIUM BROMIDE AND ALBUTEROL SULFATE 3 ML: .5; 3 SOLUTION RESPIRATORY (INHALATION) at 15:27

## 2019-09-19 RX ADMIN — SODIUM CHLORIDE SOLN NEBU 7% 4 ML: 7 NEBU SOLN at 19:42

## 2019-09-19 RX ADMIN — BISACODYL 5 MG: 5 TABLET, COATED ORAL at 17:54

## 2019-09-19 RX ADMIN — IPRATROPIUM BROMIDE AND ALBUTEROL SULFATE 3 ML: .5; 3 SOLUTION RESPIRATORY (INHALATION) at 11:07

## 2019-09-19 RX ADMIN — DIVALPROEX SODIUM 500 MG: 500 TABLET, EXTENDED RELEASE ORAL at 20:41

## 2019-09-19 RX ADMIN — ACETAMINOPHEN 650 MG: 325 TABLET ORAL at 21:39

## 2019-09-19 RX ADMIN — CEFTRIAXONE 1 G: 1 INJECTION, SOLUTION INTRAVENOUS at 18:48

## 2019-09-20 LAB
ANION GAP SERPL CALCULATED.3IONS-SCNC: 5.4 MMOL/L (ref 5–15)
ARTERIAL PATENCY WRIST A: POSITIVE
ATMOSPHERIC PRESS: 744 MMHG
BASE EXCESS BLDA CALC-SCNC: 17 MMOL/L (ref 0–2)
BDY SITE: ABNORMAL
BODY TEMPERATURE: 98.6 C
BUN BLD-MCNC: 16 MG/DL (ref 6–20)
BUN/CREAT SERPL: 26.2 (ref 7–25)
CALCIUM SPEC-SCNC: 9 MG/DL (ref 8.6–10.5)
CHLORIDE SERPL-SCNC: 89 MMOL/L (ref 98–107)
CO2 SERPL-SCNC: 40.6 MMOL/L (ref 22–29)
CREAT BLD-MCNC: 0.61 MG/DL (ref 0.57–1)
GAS FLOW AIRWAY: 60 LPM
GFR SERPL CREATININE-BSD FRML MDRD: 103 ML/MIN/1.73
GLUCOSE BLD-MCNC: 79 MG/DL (ref 65–99)
GLUCOSE BLDC GLUCOMTR-MCNC: 98 MG/DL (ref 70–130)
HCO3 BLDA-SCNC: 42.9 MMOL/L (ref 20–26)
HGB BLDA-MCNC: 12.5 G/DL (ref 13.5–17.5)
HOROWITZ INDEX BLD+IHG-RTO: 50 %
MODALITY: ABNORMAL
PCO2 BLDA: 55.3 MM HG (ref 35–45)
PCO2 TEMP ADJ BLD: 55.3 MM HG (ref 35–45)
PH BLDA: 7.5 PH UNITS (ref 7.35–7.45)
PH, TEMP CORRECTED: 7.5 PH UNITS (ref 7.35–7.45)
PO2 BLDA: 57.3 MM HG (ref 83–108)
PO2 TEMP ADJ BLD: 57.3 MM HG (ref 83–108)
POTASSIUM BLD-SCNC: 3.7 MMOL/L (ref 3.5–5.2)
SAO2 % BLDCOA: 90.5 % (ref 94–99)
SODIUM BLD-SCNC: 135 MMOL/L (ref 136–145)
VENTILATOR MODE: ABNORMAL

## 2019-09-20 PROCEDURE — 36600 WITHDRAWAL OF ARTERIAL BLOOD: CPT

## 2019-09-20 PROCEDURE — 80048 BASIC METABOLIC PNL TOTAL CA: CPT | Performed by: FAMILY MEDICINE

## 2019-09-20 PROCEDURE — 82962 GLUCOSE BLOOD TEST: CPT

## 2019-09-20 PROCEDURE — 25010000002 ENOXAPARIN PER 10 MG: Performed by: FAMILY MEDICINE

## 2019-09-20 PROCEDURE — 94799 UNLISTED PULMONARY SVC/PX: CPT

## 2019-09-20 PROCEDURE — 82803 BLOOD GASES ANY COMBINATION: CPT

## 2019-09-20 PROCEDURE — 97530 THERAPEUTIC ACTIVITIES: CPT

## 2019-09-20 PROCEDURE — 97110 THERAPEUTIC EXERCISES: CPT

## 2019-09-20 PROCEDURE — 25010000002 FUROSEMIDE PER 20 MG: Performed by: FAMILY MEDICINE

## 2019-09-20 PROCEDURE — 94668 MNPJ CHEST WALL SBSQ: CPT

## 2019-09-20 RX ORDER — DOXYCYCLINE 100 MG/1
100 CAPSULE ORAL EVERY 12 HOURS SCHEDULED
Status: DISCONTINUED | OUTPATIENT
Start: 2019-09-20 | End: 2019-09-25

## 2019-09-20 RX ADMIN — BISACODYL 5 MG: 5 TABLET, COATED ORAL at 08:07

## 2019-09-20 RX ADMIN — ACETAMINOPHEN 650 MG: 325 TABLET ORAL at 01:12

## 2019-09-20 RX ADMIN — DOXYCYCLINE 100 MG: 100 CAPSULE ORAL at 09:10

## 2019-09-20 RX ADMIN — DIVALPROEX SODIUM 500 MG: 500 TABLET, EXTENDED RELEASE ORAL at 20:48

## 2019-09-20 RX ADMIN — BUDESONIDE 0.5 MG: 0.5 SUSPENSION RESPIRATORY (INHALATION) at 08:47

## 2019-09-20 RX ADMIN — RISPERIDONE 2 MG: 1 TABLET ORAL at 20:48

## 2019-09-20 RX ADMIN — DICLOFENAC 4 G: 10 GEL TOPICAL at 17:34

## 2019-09-20 RX ADMIN — SODIUM CHLORIDE SOLN NEBU 7% 4 ML: 7 NEBU SOLN at 07:03

## 2019-09-20 RX ADMIN — DOXYCYCLINE 100 MG: 100 CAPSULE ORAL at 20:48

## 2019-09-20 RX ADMIN — SODIUM CHLORIDE SOLN NEBU 7% 4 ML: 7 NEBU SOLN at 19:17

## 2019-09-20 RX ADMIN — SODIUM CHLORIDE, PRESERVATIVE FREE 10 ML: 5 INJECTION INTRAVENOUS at 14:01

## 2019-09-20 RX ADMIN — IPRATROPIUM BROMIDE AND ALBUTEROL SULFATE 3 ML: .5; 3 SOLUTION RESPIRATORY (INHALATION) at 15:41

## 2019-09-20 RX ADMIN — FUROSEMIDE 40 MG: 10 INJECTION, SOLUTION INTRAMUSCULAR; INTRAVENOUS at 05:25

## 2019-09-20 RX ADMIN — FUROSEMIDE 40 MG: 10 INJECTION, SOLUTION INTRAMUSCULAR; INTRAVENOUS at 14:01

## 2019-09-20 RX ADMIN — ACETAMINOPHEN 650 MG: 325 TABLET ORAL at 17:34

## 2019-09-20 RX ADMIN — IPRATROPIUM BROMIDE AND ALBUTEROL SULFATE 3 ML: .5; 3 SOLUTION RESPIRATORY (INHALATION) at 22:51

## 2019-09-20 RX ADMIN — ENOXAPARIN SODIUM 40 MG: 40 INJECTION SUBCUTANEOUS at 05:25

## 2019-09-20 RX ADMIN — IPRATROPIUM BROMIDE AND ALBUTEROL SULFATE 3 ML: .5; 3 SOLUTION RESPIRATORY (INHALATION) at 03:03

## 2019-09-20 RX ADMIN — BUDESONIDE 0.5 MG: 0.5 SUSPENSION RESPIRATORY (INHALATION) at 19:17

## 2019-09-20 RX ADMIN — ENOXAPARIN SODIUM 40 MG: 40 INJECTION SUBCUTANEOUS at 17:34

## 2019-09-20 RX ADMIN — ACETAMINOPHEN 650 MG: 325 TABLET ORAL at 21:17

## 2019-09-20 RX ADMIN — DIVALPROEX SODIUM 500 MG: 500 TABLET, EXTENDED RELEASE ORAL at 08:09

## 2019-09-20 RX ADMIN — IPRATROPIUM BROMIDE AND ALBUTEROL SULFATE 3 ML: .5; 3 SOLUTION RESPIRATORY (INHALATION) at 06:56

## 2019-09-20 RX ADMIN — IPRATROPIUM BROMIDE AND ALBUTEROL SULFATE 3 ML: .5; 3 SOLUTION RESPIRATORY (INHALATION) at 11:00

## 2019-09-20 RX ADMIN — IPRATROPIUM BROMIDE AND ALBUTEROL SULFATE 3 ML: .5; 3 SOLUTION RESPIRATORY (INHALATION) at 19:17

## 2019-09-20 RX ADMIN — ACETAMINOPHEN 650 MG: 325 TABLET ORAL at 05:43

## 2019-09-20 RX ADMIN — FUROSEMIDE 40 MG: 10 INJECTION, SOLUTION INTRAMUSCULAR; INTRAVENOUS at 22:17

## 2019-09-20 RX ADMIN — RISPERIDONE 2 MG: 1 TABLET ORAL at 08:09

## 2019-09-20 RX ADMIN — SODIUM CHLORIDE, PRESERVATIVE FREE 10 ML: 5 INJECTION INTRAVENOUS at 09:11

## 2019-09-20 RX ADMIN — DICLOFENAC 4 G: 10 GEL TOPICAL at 20:32

## 2019-09-20 RX ADMIN — DICLOFENAC 4 G: 10 GEL TOPICAL at 11:33

## 2019-09-20 RX ADMIN — SODIUM CHLORIDE, PRESERVATIVE FREE 10 ML: 5 INJECTION INTRAVENOUS at 21:18

## 2019-09-21 ENCOUNTER — APPOINTMENT (OUTPATIENT)
Dept: GENERAL RADIOLOGY | Facility: HOSPITAL | Age: 49
End: 2019-09-21

## 2019-09-21 LAB
ALBUMIN SERPL-MCNC: 2.8 G/DL (ref 3.5–5.2)
ALBUMIN/GLOB SERPL: 1 G/DL
ALP SERPL-CCNC: 56 U/L (ref 39–117)
ALT SERPL W P-5'-P-CCNC: 7 U/L (ref 1–33)
ANION GAP SERPL CALCULATED.3IONS-SCNC: 7.7 MMOL/L (ref 5–15)
AST SERPL-CCNC: 8 U/L (ref 1–32)
BILIRUB SERPL-MCNC: 0.6 MG/DL (ref 0.2–1.2)
BUN BLD-MCNC: 15 MG/DL (ref 6–20)
BUN/CREAT SERPL: 26.3 (ref 7–25)
CALCIUM SPEC-SCNC: 9.2 MG/DL (ref 8.6–10.5)
CHLORIDE SERPL-SCNC: 88 MMOL/L (ref 98–107)
CO2 SERPL-SCNC: 39.3 MMOL/L (ref 22–29)
CREAT BLD-MCNC: 0.57 MG/DL (ref 0.57–1)
DEPRECATED RDW RBC AUTO: 57.4 FL (ref 37–54)
ERYTHROCYTE [DISTWIDTH] IN BLOOD BY AUTOMATED COUNT: 17.2 % (ref 12.3–15.4)
GFR SERPL CREATININE-BSD FRML MDRD: 112 ML/MIN/1.73
GLOBULIN UR ELPH-MCNC: 2.7 GM/DL
GLUCOSE BLD-MCNC: 76 MG/DL (ref 65–99)
HCT VFR BLD AUTO: 37.1 % (ref 34–46.6)
HGB BLD-MCNC: 11.6 G/DL (ref 12–15.9)
MCH RBC QN AUTO: 28.5 PG (ref 26.6–33)
MCHC RBC AUTO-ENTMCNC: 31.3 G/DL (ref 31.5–35.7)
MCV RBC AUTO: 91.2 FL (ref 79–97)
PLATELET # BLD AUTO: 141 10*3/MM3 (ref 140–450)
PMV BLD AUTO: 8.9 FL (ref 6–12)
POTASSIUM BLD-SCNC: 3.4 MMOL/L (ref 3.5–5.2)
PROT SERPL-MCNC: 5.5 G/DL (ref 6–8.5)
RBC # BLD AUTO: 4.07 10*6/MM3 (ref 3.77–5.28)
SODIUM BLD-SCNC: 135 MMOL/L (ref 136–145)
WBC NRBC COR # BLD: 5.97 10*3/MM3 (ref 3.4–10.8)

## 2019-09-21 PROCEDURE — 94799 UNLISTED PULMONARY SVC/PX: CPT

## 2019-09-21 PROCEDURE — 71045 X-RAY EXAM CHEST 1 VIEW: CPT

## 2019-09-21 PROCEDURE — 25010000002 FUROSEMIDE PER 20 MG: Performed by: FAMILY MEDICINE

## 2019-09-21 PROCEDURE — 94668 MNPJ CHEST WALL SBSQ: CPT

## 2019-09-21 PROCEDURE — 80053 COMPREHEN METABOLIC PANEL: CPT | Performed by: FAMILY MEDICINE

## 2019-09-21 PROCEDURE — 85027 COMPLETE CBC AUTOMATED: CPT | Performed by: FAMILY MEDICINE

## 2019-09-21 PROCEDURE — 25010000002 ENOXAPARIN PER 10 MG: Performed by: FAMILY MEDICINE

## 2019-09-21 PROCEDURE — 97110 THERAPEUTIC EXERCISES: CPT

## 2019-09-21 PROCEDURE — 94660 CPAP INITIATION&MGMT: CPT

## 2019-09-21 RX ADMIN — ENOXAPARIN SODIUM 40 MG: 40 INJECTION SUBCUTANEOUS at 18:35

## 2019-09-21 RX ADMIN — DICLOFENAC 4 G: 10 GEL TOPICAL at 08:55

## 2019-09-21 RX ADMIN — FUROSEMIDE 40 MG: 10 INJECTION, SOLUTION INTRAMUSCULAR; INTRAVENOUS at 05:31

## 2019-09-21 RX ADMIN — RISPERIDONE 2 MG: 1 TABLET ORAL at 08:55

## 2019-09-21 RX ADMIN — SODIUM CHLORIDE, PRESERVATIVE FREE 10 ML: 5 INJECTION INTRAVENOUS at 22:03

## 2019-09-21 RX ADMIN — DICLOFENAC 4 G: 10 GEL TOPICAL at 18:35

## 2019-09-21 RX ADMIN — IPRATROPIUM BROMIDE AND ALBUTEROL SULFATE 3 ML: .5; 3 SOLUTION RESPIRATORY (INHALATION) at 11:59

## 2019-09-21 RX ADMIN — DOXYCYCLINE 100 MG: 100 CAPSULE ORAL at 20:32

## 2019-09-21 RX ADMIN — DIVALPROEX SODIUM 500 MG: 500 TABLET, EXTENDED RELEASE ORAL at 20:32

## 2019-09-21 RX ADMIN — BUDESONIDE 0.5 MG: 0.5 SUSPENSION RESPIRATORY (INHALATION) at 07:55

## 2019-09-21 RX ADMIN — FUROSEMIDE 40 MG: 10 INJECTION, SOLUTION INTRAMUSCULAR; INTRAVENOUS at 22:24

## 2019-09-21 RX ADMIN — ACETAMINOPHEN 650 MG: 325 TABLET ORAL at 19:23

## 2019-09-21 RX ADMIN — FUROSEMIDE 40 MG: 10 INJECTION, SOLUTION INTRAMUSCULAR; INTRAVENOUS at 14:19

## 2019-09-21 RX ADMIN — SODIUM CHLORIDE SOLN NEBU 7% 4 ML: 7 NEBU SOLN at 19:31

## 2019-09-21 RX ADMIN — DOXYCYCLINE 100 MG: 100 CAPSULE ORAL at 08:55

## 2019-09-21 RX ADMIN — IPRATROPIUM BROMIDE AND ALBUTEROL SULFATE 3 ML: .5; 3 SOLUTION RESPIRATORY (INHALATION) at 22:48

## 2019-09-21 RX ADMIN — NICOTINE 1 PATCH: 21 PATCH TRANSDERMAL at 22:24

## 2019-09-21 RX ADMIN — ACETAMINOPHEN 650 MG: 325 TABLET ORAL at 12:31

## 2019-09-21 RX ADMIN — BUDESONIDE 0.5 MG: 0.5 SUSPENSION RESPIRATORY (INHALATION) at 19:31

## 2019-09-21 RX ADMIN — DICLOFENAC 4 G: 10 GEL TOPICAL at 20:31

## 2019-09-21 RX ADMIN — IPRATROPIUM BROMIDE AND ALBUTEROL SULFATE 3 ML: .5; 3 SOLUTION RESPIRATORY (INHALATION) at 19:31

## 2019-09-21 RX ADMIN — IPRATROPIUM BROMIDE AND ALBUTEROL SULFATE 3 ML: .5; 3 SOLUTION RESPIRATORY (INHALATION) at 15:16

## 2019-09-21 RX ADMIN — DIVALPROEX SODIUM 500 MG: 500 TABLET, EXTENDED RELEASE ORAL at 08:55

## 2019-09-21 RX ADMIN — DICLOFENAC 4 G: 10 GEL TOPICAL at 12:27

## 2019-09-21 RX ADMIN — IPRATROPIUM BROMIDE AND ALBUTEROL SULFATE 3 ML: .5; 3 SOLUTION RESPIRATORY (INHALATION) at 03:03

## 2019-09-21 RX ADMIN — ENOXAPARIN SODIUM 40 MG: 40 INJECTION SUBCUTANEOUS at 05:30

## 2019-09-21 RX ADMIN — RISPERIDONE 2 MG: 1 TABLET ORAL at 20:32

## 2019-09-21 RX ADMIN — POTASSIUM CHLORIDE 40 MEQ: 1.5 POWDER, FOR SOLUTION ORAL at 16:42

## 2019-09-21 RX ADMIN — IPRATROPIUM BROMIDE AND ALBUTEROL SULFATE 3 ML: .5; 3 SOLUTION RESPIRATORY (INHALATION) at 07:55

## 2019-09-21 RX ADMIN — SODIUM CHLORIDE SOLN NEBU 7% 4 ML: 7 NEBU SOLN at 11:59

## 2019-09-21 RX ADMIN — SODIUM CHLORIDE, PRESERVATIVE FREE 10 ML: 5 INJECTION INTRAVENOUS at 08:55

## 2019-09-21 RX ADMIN — ACETAMINOPHEN 650 MG: 325 TABLET ORAL at 06:05

## 2019-09-22 LAB
ANION GAP SERPL CALCULATED.3IONS-SCNC: 8.7 MMOL/L (ref 5–15)
BUN BLD-MCNC: 16 MG/DL (ref 6–20)
BUN/CREAT SERPL: 25.8 (ref 7–25)
CALCIUM SPEC-SCNC: 9.1 MG/DL (ref 8.6–10.5)
CHLORIDE SERPL-SCNC: 88 MMOL/L (ref 98–107)
CO2 SERPL-SCNC: 37.3 MMOL/L (ref 22–29)
CREAT BLD-MCNC: 0.62 MG/DL (ref 0.57–1)
GFR SERPL CREATININE-BSD FRML MDRD: 101 ML/MIN/1.73
GLUCOSE BLD-MCNC: 102 MG/DL (ref 65–99)
GLUCOSE BLDC GLUCOMTR-MCNC: 124 MG/DL (ref 70–130)
POTASSIUM BLD-SCNC: 3.1 MMOL/L (ref 3.5–5.2)
SODIUM BLD-SCNC: 134 MMOL/L (ref 136–145)

## 2019-09-22 PROCEDURE — 94799 UNLISTED PULMONARY SVC/PX: CPT

## 2019-09-22 PROCEDURE — 25010000002 ENOXAPARIN PER 10 MG: Performed by: FAMILY MEDICINE

## 2019-09-22 PROCEDURE — 97116 GAIT TRAINING THERAPY: CPT

## 2019-09-22 PROCEDURE — 94668 MNPJ CHEST WALL SBSQ: CPT

## 2019-09-22 PROCEDURE — 94660 CPAP INITIATION&MGMT: CPT

## 2019-09-22 PROCEDURE — 82962 GLUCOSE BLOOD TEST: CPT

## 2019-09-22 PROCEDURE — 80048 BASIC METABOLIC PNL TOTAL CA: CPT | Performed by: FAMILY MEDICINE

## 2019-09-22 PROCEDURE — 25010000002 FUROSEMIDE PER 20 MG: Performed by: FAMILY MEDICINE

## 2019-09-22 RX ORDER — NYSTATIN 100000 [USP'U]/G
POWDER TOPICAL 2 TIMES DAILY
Status: DISCONTINUED | OUTPATIENT
Start: 2019-09-22 | End: 2019-09-26 | Stop reason: HOSPADM

## 2019-09-22 RX ORDER — POTASSIUM CHLORIDE 1.5 G/1.77G
20 POWDER, FOR SOLUTION ORAL 2 TIMES DAILY
Status: DISCONTINUED | OUTPATIENT
Start: 2019-09-22 | End: 2019-09-26 | Stop reason: HOSPADM

## 2019-09-22 RX ORDER — POTASSIUM CHLORIDE 20 MEQ/1
40 TABLET, EXTENDED RELEASE ORAL DAILY
Status: DISCONTINUED | OUTPATIENT
Start: 2019-09-22 | End: 2019-09-23

## 2019-09-22 RX ADMIN — SODIUM CHLORIDE SOLN NEBU 7% 4 ML: 7 NEBU SOLN at 19:08

## 2019-09-22 RX ADMIN — IPRATROPIUM BROMIDE AND ALBUTEROL SULFATE 3 ML: .5; 3 SOLUTION RESPIRATORY (INHALATION) at 12:01

## 2019-09-22 RX ADMIN — IPRATROPIUM BROMIDE AND ALBUTEROL SULFATE 3 ML: .5; 3 SOLUTION RESPIRATORY (INHALATION) at 19:08

## 2019-09-22 RX ADMIN — ACETAMINOPHEN 650 MG: 325 TABLET ORAL at 02:57

## 2019-09-22 RX ADMIN — DICLOFENAC 4 G: 10 GEL TOPICAL at 18:28

## 2019-09-22 RX ADMIN — SODIUM CHLORIDE, PRESERVATIVE FREE 10 ML: 5 INJECTION INTRAVENOUS at 20:57

## 2019-09-22 RX ADMIN — BUDESONIDE 0.5 MG: 0.5 SUSPENSION RESPIRATORY (INHALATION) at 07:33

## 2019-09-22 RX ADMIN — DOXYCYCLINE 100 MG: 100 CAPSULE ORAL at 10:48

## 2019-09-22 RX ADMIN — IPRATROPIUM BROMIDE AND ALBUTEROL SULFATE 3 ML: .5; 3 SOLUTION RESPIRATORY (INHALATION) at 23:25

## 2019-09-22 RX ADMIN — IPRATROPIUM BROMIDE AND ALBUTEROL SULFATE 3 ML: .5; 3 SOLUTION RESPIRATORY (INHALATION) at 03:11

## 2019-09-22 RX ADMIN — SODIUM CHLORIDE, PRESERVATIVE FREE 10 ML: 5 INJECTION INTRAVENOUS at 14:04

## 2019-09-22 RX ADMIN — IPRATROPIUM BROMIDE AND ALBUTEROL SULFATE 3 ML: .5; 3 SOLUTION RESPIRATORY (INHALATION) at 15:57

## 2019-09-22 RX ADMIN — FUROSEMIDE 40 MG: 10 INJECTION, SOLUTION INTRAMUSCULAR; INTRAVENOUS at 14:04

## 2019-09-22 RX ADMIN — DOXYCYCLINE 100 MG: 100 CAPSULE ORAL at 20:57

## 2019-09-22 RX ADMIN — IPRATROPIUM BROMIDE AND ALBUTEROL SULFATE 3 ML: .5; 3 SOLUTION RESPIRATORY (INHALATION) at 07:33

## 2019-09-22 RX ADMIN — DICLOFENAC 4 G: 10 GEL TOPICAL at 23:09

## 2019-09-22 RX ADMIN — DIVALPROEX SODIUM 500 MG: 500 TABLET, EXTENDED RELEASE ORAL at 08:56

## 2019-09-22 RX ADMIN — SODIUM CHLORIDE SOLN NEBU 7% 4 ML: 7 NEBU SOLN at 12:01

## 2019-09-22 RX ADMIN — FUROSEMIDE 60 MG: 40 TABLET ORAL at 18:27

## 2019-09-22 RX ADMIN — ENOXAPARIN SODIUM 40 MG: 40 INJECTION SUBCUTANEOUS at 18:28

## 2019-09-22 RX ADMIN — DIVALPROEX SODIUM 500 MG: 500 TABLET, EXTENDED RELEASE ORAL at 20:56

## 2019-09-22 RX ADMIN — POTASSIUM CHLORIDE 20 MEQ: 1.5 POWDER, FOR SOLUTION ORAL at 20:54

## 2019-09-22 RX ADMIN — NICOTINE 1 PATCH: 21 PATCH TRANSDERMAL at 23:09

## 2019-09-22 RX ADMIN — ACETAMINOPHEN 650 MG: 325 TABLET ORAL at 21:50

## 2019-09-22 RX ADMIN — ENOXAPARIN SODIUM 40 MG: 40 INJECTION SUBCUTANEOUS at 06:22

## 2019-09-22 RX ADMIN — POTASSIUM CHLORIDE 40 MEQ: 20 TABLET, EXTENDED RELEASE ORAL at 11:07

## 2019-09-22 RX ADMIN — DICLOFENAC 4 G: 10 GEL TOPICAL at 08:56

## 2019-09-22 RX ADMIN — FUROSEMIDE 40 MG: 10 INJECTION, SOLUTION INTRAMUSCULAR; INTRAVENOUS at 06:22

## 2019-09-22 RX ADMIN — RISPERIDONE 2 MG: 1 TABLET ORAL at 08:56

## 2019-09-22 RX ADMIN — DICLOFENAC 4 G: 10 GEL TOPICAL at 14:04

## 2019-09-22 RX ADMIN — BUDESONIDE 0.5 MG: 0.5 SUSPENSION RESPIRATORY (INHALATION) at 19:08

## 2019-09-22 RX ADMIN — NYSTATIN: 100000 POWDER TOPICAL at 20:56

## 2019-09-22 RX ADMIN — RISPERIDONE 2 MG: 1 TABLET ORAL at 20:56

## 2019-09-22 RX ADMIN — POTASSIUM CHLORIDE 40 MEQ: 1500 TABLET, EXTENDED RELEASE ORAL at 16:27

## 2019-09-23 ENCOUNTER — APPOINTMENT (OUTPATIENT)
Dept: GENERAL RADIOLOGY | Facility: HOSPITAL | Age: 49
End: 2019-09-23

## 2019-09-23 LAB
ANION GAP SERPL CALCULATED.3IONS-SCNC: 8.6 MMOL/L (ref 5–15)
BUN BLD-MCNC: 17 MG/DL (ref 6–20)
BUN/CREAT SERPL: 27.4 (ref 7–25)
CALCIUM SPEC-SCNC: 9.1 MG/DL (ref 8.6–10.5)
CHLORIDE SERPL-SCNC: 90 MMOL/L (ref 98–107)
CO2 SERPL-SCNC: 35.4 MMOL/L (ref 22–29)
CREAT BLD-MCNC: 0.62 MG/DL (ref 0.57–1)
GFR SERPL CREATININE-BSD FRML MDRD: 101 ML/MIN/1.73
GLUCOSE BLD-MCNC: 84 MG/DL (ref 65–99)
GLUCOSE BLDC GLUCOMTR-MCNC: 105 MG/DL (ref 70–130)
GLUCOSE BLDC GLUCOMTR-MCNC: 131 MG/DL (ref 70–130)
POTASSIUM BLD-SCNC: 3.9 MMOL/L (ref 3.5–5.2)
SODIUM BLD-SCNC: 134 MMOL/L (ref 136–145)

## 2019-09-23 PROCEDURE — 94799 UNLISTED PULMONARY SVC/PX: CPT

## 2019-09-23 PROCEDURE — 97530 THERAPEUTIC ACTIVITIES: CPT

## 2019-09-23 PROCEDURE — 97116 GAIT TRAINING THERAPY: CPT

## 2019-09-23 PROCEDURE — 25010000002 ENOXAPARIN PER 10 MG: Performed by: FAMILY MEDICINE

## 2019-09-23 PROCEDURE — 80048 BASIC METABOLIC PNL TOTAL CA: CPT | Performed by: FAMILY MEDICINE

## 2019-09-23 PROCEDURE — 71046 X-RAY EXAM CHEST 2 VIEWS: CPT

## 2019-09-23 PROCEDURE — 82962 GLUCOSE BLOOD TEST: CPT

## 2019-09-23 RX ADMIN — DIVALPROEX SODIUM 500 MG: 500 TABLET, EXTENDED RELEASE ORAL at 22:31

## 2019-09-23 RX ADMIN — NYSTATIN: 100000 POWDER TOPICAL at 22:31

## 2019-09-23 RX ADMIN — DICLOFENAC 4 G: 10 GEL TOPICAL at 23:01

## 2019-09-23 RX ADMIN — ENOXAPARIN SODIUM 40 MG: 40 INJECTION SUBCUTANEOUS at 18:40

## 2019-09-23 RX ADMIN — NYSTATIN: 100000 POWDER TOPICAL at 09:13

## 2019-09-23 RX ADMIN — DICLOFENAC 4 G: 10 GEL TOPICAL at 12:27

## 2019-09-23 RX ADMIN — RISPERIDONE 2 MG: 1 TABLET ORAL at 22:31

## 2019-09-23 RX ADMIN — BUDESONIDE 0.5 MG: 0.5 SUSPENSION RESPIRATORY (INHALATION) at 19:00

## 2019-09-23 RX ADMIN — DOXYCYCLINE 100 MG: 100 CAPSULE ORAL at 09:13

## 2019-09-23 RX ADMIN — FUROSEMIDE 60 MG: 40 TABLET ORAL at 09:13

## 2019-09-23 RX ADMIN — DIVALPROEX SODIUM 500 MG: 500 TABLET, EXTENDED RELEASE ORAL at 09:13

## 2019-09-23 RX ADMIN — IPRATROPIUM BROMIDE AND ALBUTEROL SULFATE 3 ML: .5; 3 SOLUTION RESPIRATORY (INHALATION) at 15:24

## 2019-09-23 RX ADMIN — IPRATROPIUM BROMIDE AND ALBUTEROL SULFATE 3 ML: .5; 3 SOLUTION RESPIRATORY (INHALATION) at 03:15

## 2019-09-23 RX ADMIN — SODIUM CHLORIDE SOLN NEBU 7% 4 ML: 7 NEBU SOLN at 07:11

## 2019-09-23 RX ADMIN — ACETAMINOPHEN 650 MG: 325 TABLET ORAL at 09:13

## 2019-09-23 RX ADMIN — RISPERIDONE 2 MG: 1 TABLET ORAL at 09:13

## 2019-09-23 RX ADMIN — DOXYCYCLINE 100 MG: 100 CAPSULE ORAL at 22:31

## 2019-09-23 RX ADMIN — FUROSEMIDE 60 MG: 40 TABLET ORAL at 18:40

## 2019-09-23 RX ADMIN — IPRATROPIUM BROMIDE AND ALBUTEROL SULFATE 3 ML: .5; 3 SOLUTION RESPIRATORY (INHALATION) at 19:00

## 2019-09-23 RX ADMIN — SODIUM CHLORIDE SOLN NEBU 7% 4 ML: 7 NEBU SOLN at 18:53

## 2019-09-23 RX ADMIN — BUDESONIDE 0.5 MG: 0.5 SUSPENSION RESPIRATORY (INHALATION) at 07:00

## 2019-09-23 RX ADMIN — POTASSIUM CHLORIDE 20 MEQ: 1.5 POWDER, FOR SOLUTION ORAL at 09:13

## 2019-09-23 RX ADMIN — ACETAMINOPHEN 650 MG: 325 TABLET ORAL at 22:31

## 2019-09-23 RX ADMIN — DICLOFENAC 4 G: 10 GEL TOPICAL at 18:40

## 2019-09-23 RX ADMIN — ENOXAPARIN SODIUM 40 MG: 40 INJECTION SUBCUTANEOUS at 05:34

## 2019-09-23 RX ADMIN — NICOTINE 1 PATCH: 21 PATCH TRANSDERMAL at 22:33

## 2019-09-23 RX ADMIN — DICLOFENAC 4 G: 10 GEL TOPICAL at 05:34

## 2019-09-23 RX ADMIN — SODIUM CHLORIDE, PRESERVATIVE FREE 10 ML: 5 INJECTION INTRAVENOUS at 21:00

## 2019-09-23 RX ADMIN — IPRATROPIUM BROMIDE AND ALBUTEROL SULFATE 3 ML: .5; 3 SOLUTION RESPIRATORY (INHALATION) at 23:16

## 2019-09-23 RX ADMIN — IPRATROPIUM BROMIDE AND ALBUTEROL SULFATE 3 ML: .5; 3 SOLUTION RESPIRATORY (INHALATION) at 11:44

## 2019-09-23 RX ADMIN — POTASSIUM CHLORIDE 20 MEQ: 1.5 POWDER, FOR SOLUTION ORAL at 22:32

## 2019-09-23 RX ADMIN — IPRATROPIUM BROMIDE AND ALBUTEROL SULFATE 3 ML: .5; 3 SOLUTION RESPIRATORY (INHALATION) at 07:00

## 2019-09-23 RX ADMIN — SODIUM CHLORIDE, PRESERVATIVE FREE 10 ML: 5 INJECTION INTRAVENOUS at 09:16

## 2019-09-24 LAB
ANION GAP SERPL CALCULATED.3IONS-SCNC: 8.7 MMOL/L (ref 5–15)
BUN BLD-MCNC: 16 MG/DL (ref 6–20)
BUN/CREAT SERPL: 27.6 (ref 7–25)
CALCIUM SPEC-SCNC: 8.8 MG/DL (ref 8.6–10.5)
CHLORIDE SERPL-SCNC: 92 MMOL/L (ref 98–107)
CO2 SERPL-SCNC: 33.3 MMOL/L (ref 22–29)
CREAT BLD-MCNC: 0.58 MG/DL (ref 0.57–1)
GFR SERPL CREATININE-BSD FRML MDRD: 110 ML/MIN/1.73
GLUCOSE BLD-MCNC: 83 MG/DL (ref 65–99)
GLUCOSE BLDC GLUCOMTR-MCNC: 177 MG/DL (ref 70–130)
GLUCOSE BLDC GLUCOMTR-MCNC: 94 MG/DL (ref 70–130)
POTASSIUM BLD-SCNC: 4 MMOL/L (ref 3.5–5.2)
SODIUM BLD-SCNC: 134 MMOL/L (ref 136–145)

## 2019-09-24 PROCEDURE — 80048 BASIC METABOLIC PNL TOTAL CA: CPT | Performed by: FAMILY MEDICINE

## 2019-09-24 PROCEDURE — 94618 PULMONARY STRESS TESTING: CPT

## 2019-09-24 PROCEDURE — 97116 GAIT TRAINING THERAPY: CPT

## 2019-09-24 PROCEDURE — 97530 THERAPEUTIC ACTIVITIES: CPT

## 2019-09-24 PROCEDURE — 25010000002 ENOXAPARIN PER 10 MG: Performed by: FAMILY MEDICINE

## 2019-09-24 PROCEDURE — 94799 UNLISTED PULMONARY SVC/PX: CPT

## 2019-09-24 PROCEDURE — 82962 GLUCOSE BLOOD TEST: CPT

## 2019-09-24 RX ORDER — IPRATROPIUM BROMIDE AND ALBUTEROL SULFATE 2.5; .5 MG/3ML; MG/3ML
3 SOLUTION RESPIRATORY (INHALATION) EVERY 4 HOURS PRN
Status: DISCONTINUED | OUTPATIENT
Start: 2019-09-24 | End: 2019-09-26 | Stop reason: HOSPADM

## 2019-09-24 RX ADMIN — SODIUM CHLORIDE SOLN NEBU 7% 4 ML: 7 NEBU SOLN at 20:07

## 2019-09-24 RX ADMIN — RISPERIDONE 2 MG: 1 TABLET ORAL at 20:26

## 2019-09-24 RX ADMIN — DIVALPROEX SODIUM 500 MG: 500 TABLET, EXTENDED RELEASE ORAL at 20:25

## 2019-09-24 RX ADMIN — DOXYCYCLINE 100 MG: 100 CAPSULE ORAL at 08:06

## 2019-09-24 RX ADMIN — POTASSIUM CHLORIDE 20 MEQ: 1.5 POWDER, FOR SOLUTION ORAL at 08:07

## 2019-09-24 RX ADMIN — DOXYCYCLINE 100 MG: 100 CAPSULE ORAL at 20:25

## 2019-09-24 RX ADMIN — FUROSEMIDE 60 MG: 40 TABLET ORAL at 08:07

## 2019-09-24 RX ADMIN — SODIUM CHLORIDE, PRESERVATIVE FREE 10 ML: 5 INJECTION INTRAVENOUS at 08:37

## 2019-09-24 RX ADMIN — ENOXAPARIN SODIUM 40 MG: 40 INJECTION SUBCUTANEOUS at 06:02

## 2019-09-24 RX ADMIN — IPRATROPIUM BROMIDE AND ALBUTEROL SULFATE 3 ML: .5; 3 SOLUTION RESPIRATORY (INHALATION) at 06:44

## 2019-09-24 RX ADMIN — RISPERIDONE 2 MG: 1 TABLET ORAL at 08:06

## 2019-09-24 RX ADMIN — SODIUM CHLORIDE SOLN NEBU 7% 4 ML: 7 NEBU SOLN at 06:56

## 2019-09-24 RX ADMIN — NYSTATIN: 100000 POWDER TOPICAL at 20:26

## 2019-09-24 RX ADMIN — IPRATROPIUM BROMIDE AND ALBUTEROL SULFATE 3 ML: .5; 3 SOLUTION RESPIRATORY (INHALATION) at 03:44

## 2019-09-24 RX ADMIN — DICLOFENAC 4 G: 10 GEL TOPICAL at 17:25

## 2019-09-24 RX ADMIN — FUROSEMIDE 60 MG: 40 TABLET ORAL at 17:25

## 2019-09-24 RX ADMIN — NYSTATIN: 100000 POWDER TOPICAL at 08:09

## 2019-09-24 RX ADMIN — SODIUM CHLORIDE, PRESERVATIVE FREE 10 ML: 5 INJECTION INTRAVENOUS at 20:24

## 2019-09-24 RX ADMIN — IPRATROPIUM BROMIDE AND ALBUTEROL SULFATE 3 ML: .5; 3 SOLUTION RESPIRATORY (INHALATION) at 10:52

## 2019-09-24 RX ADMIN — DICLOFENAC 4 G: 10 GEL TOPICAL at 23:46

## 2019-09-24 RX ADMIN — DICLOFENAC 4 G: 10 GEL TOPICAL at 06:02

## 2019-09-24 RX ADMIN — BUDESONIDE 0.5 MG: 0.5 SUSPENSION RESPIRATORY (INHALATION) at 20:07

## 2019-09-24 RX ADMIN — ACETAMINOPHEN 650 MG: 325 TABLET ORAL at 23:45

## 2019-09-24 RX ADMIN — DICLOFENAC 4 G: 10 GEL TOPICAL at 11:42

## 2019-09-24 RX ADMIN — BUDESONIDE 0.5 MG: 0.5 SUSPENSION RESPIRATORY (INHALATION) at 06:44

## 2019-09-24 RX ADMIN — ENOXAPARIN SODIUM 40 MG: 40 INJECTION SUBCUTANEOUS at 17:25

## 2019-09-24 RX ADMIN — IPRATROPIUM BROMIDE AND ALBUTEROL SULFATE 3 ML: .5; 3 SOLUTION RESPIRATORY (INHALATION) at 14:52

## 2019-09-24 RX ADMIN — POTASSIUM CHLORIDE 20 MEQ: 1.5 POWDER, FOR SOLUTION ORAL at 20:24

## 2019-09-24 RX ADMIN — DIVALPROEX SODIUM 500 MG: 500 TABLET, EXTENDED RELEASE ORAL at 08:06

## 2019-09-25 LAB
ANION GAP SERPL CALCULATED.3IONS-SCNC: 9.4 MMOL/L (ref 5–15)
BUN BLD-MCNC: 13 MG/DL (ref 6–20)
BUN/CREAT SERPL: 22.8 (ref 7–25)
CALCIUM SPEC-SCNC: 9.3 MG/DL (ref 8.6–10.5)
CHLORIDE SERPL-SCNC: 94 MMOL/L (ref 98–107)
CO2 SERPL-SCNC: 32.6 MMOL/L (ref 22–29)
CREAT BLD-MCNC: 0.57 MG/DL (ref 0.57–1)
GFR SERPL CREATININE-BSD FRML MDRD: 112 ML/MIN/1.73
GLUCOSE BLD-MCNC: 77 MG/DL (ref 65–99)
GLUCOSE BLDC GLUCOMTR-MCNC: 139 MG/DL (ref 70–130)
GLUCOSE BLDC GLUCOMTR-MCNC: 88 MG/DL (ref 70–130)
PLATELET # BLD AUTO: 224 10*3/MM3 (ref 140–450)
POTASSIUM BLD-SCNC: 4.2 MMOL/L (ref 3.5–5.2)
SODIUM BLD-SCNC: 136 MMOL/L (ref 136–145)

## 2019-09-25 PROCEDURE — 94799 UNLISTED PULMONARY SVC/PX: CPT

## 2019-09-25 PROCEDURE — 63710000001 DIPHENHYDRAMINE PER 50 MG: Performed by: FAMILY MEDICINE

## 2019-09-25 PROCEDURE — 85049 AUTOMATED PLATELET COUNT: CPT | Performed by: FAMILY MEDICINE

## 2019-09-25 PROCEDURE — 80048 BASIC METABOLIC PNL TOTAL CA: CPT | Performed by: FAMILY MEDICINE

## 2019-09-25 PROCEDURE — 82962 GLUCOSE BLOOD TEST: CPT

## 2019-09-25 PROCEDURE — 94668 MNPJ CHEST WALL SBSQ: CPT

## 2019-09-25 PROCEDURE — 25010000002 ENOXAPARIN PER 10 MG: Performed by: FAMILY MEDICINE

## 2019-09-25 RX ORDER — DIPHENHYDRAMINE HCL 25 MG
25 CAPSULE ORAL EVERY 6 HOURS PRN
Status: DISCONTINUED | OUTPATIENT
Start: 2019-09-25 | End: 2019-09-26 | Stop reason: HOSPADM

## 2019-09-25 RX ADMIN — POTASSIUM CHLORIDE 20 MEQ: 1.5 POWDER, FOR SOLUTION ORAL at 23:42

## 2019-09-25 RX ADMIN — DIVALPROEX SODIUM 500 MG: 500 TABLET, EXTENDED RELEASE ORAL at 08:58

## 2019-09-25 RX ADMIN — DICLOFENAC 4 G: 10 GEL TOPICAL at 05:23

## 2019-09-25 RX ADMIN — RISPERIDONE 2 MG: 1 TABLET ORAL at 09:01

## 2019-09-25 RX ADMIN — NICOTINE 1 PATCH: 21 PATCH TRANSDERMAL at 23:41

## 2019-09-25 RX ADMIN — SODIUM CHLORIDE, PRESERVATIVE FREE 10 ML: 5 INJECTION INTRAVENOUS at 23:47

## 2019-09-25 RX ADMIN — FUROSEMIDE 60 MG: 40 TABLET ORAL at 19:20

## 2019-09-25 RX ADMIN — SODIUM CHLORIDE SOLN NEBU 7% 4 ML: 7 NEBU SOLN at 20:04

## 2019-09-25 RX ADMIN — RISPERIDONE 2 MG: 1 TABLET ORAL at 21:56

## 2019-09-25 RX ADMIN — DIPHENHYDRAMINE HYDROCHLORIDE 25 MG: 25 CAPSULE ORAL at 19:24

## 2019-09-25 RX ADMIN — BUDESONIDE 0.5 MG: 0.5 SUSPENSION RESPIRATORY (INHALATION) at 20:04

## 2019-09-25 RX ADMIN — DICLOFENAC 4 G: 10 GEL TOPICAL at 14:14

## 2019-09-25 RX ADMIN — SODIUM CHLORIDE SOLN NEBU 7% 4 ML: 7 NEBU SOLN at 08:04

## 2019-09-25 RX ADMIN — DIVALPROEX SODIUM 500 MG: 500 TABLET, EXTENDED RELEASE ORAL at 21:56

## 2019-09-25 RX ADMIN — FUROSEMIDE 60 MG: 40 TABLET ORAL at 08:59

## 2019-09-25 RX ADMIN — SODIUM CHLORIDE, PRESERVATIVE FREE 10 ML: 5 INJECTION INTRAVENOUS at 09:01

## 2019-09-25 RX ADMIN — ENOXAPARIN SODIUM 40 MG: 40 INJECTION SUBCUTANEOUS at 19:20

## 2019-09-25 RX ADMIN — NYSTATIN: 100000 POWDER TOPICAL at 09:01

## 2019-09-25 RX ADMIN — BUDESONIDE 0.5 MG: 0.5 SUSPENSION RESPIRATORY (INHALATION) at 08:04

## 2019-09-25 RX ADMIN — NYSTATIN: 100000 POWDER TOPICAL at 23:43

## 2019-09-25 RX ADMIN — DIPHENHYDRAMINE HYDROCHLORIDE 25 MG: 25 CAPSULE ORAL at 09:13

## 2019-09-25 RX ADMIN — ENOXAPARIN SODIUM 40 MG: 40 INJECTION SUBCUTANEOUS at 05:22

## 2019-09-25 RX ADMIN — POTASSIUM CHLORIDE 20 MEQ: 1.5 POWDER, FOR SOLUTION ORAL at 09:00

## 2019-09-25 RX ADMIN — DICLOFENAC 4 G: 10 GEL TOPICAL at 23:43

## 2019-09-26 VITALS
WEIGHT: 253 LBS | RESPIRATION RATE: 16 BRPM | OXYGEN SATURATION: 93 % | DIASTOLIC BLOOD PRESSURE: 68 MMHG | HEIGHT: 64 IN | HEART RATE: 84 BPM | SYSTOLIC BLOOD PRESSURE: 109 MMHG | TEMPERATURE: 97.8 F | BODY MASS INDEX: 43.19 KG/M2

## 2019-09-26 LAB
ANION GAP SERPL CALCULATED.3IONS-SCNC: 11.6 MMOL/L (ref 5–15)
BUN BLD-MCNC: 13 MG/DL (ref 6–20)
BUN/CREAT SERPL: 22 (ref 7–25)
CALCIUM SPEC-SCNC: 9.3 MG/DL (ref 8.6–10.5)
CHLORIDE SERPL-SCNC: 94 MMOL/L (ref 98–107)
CO2 SERPL-SCNC: 29.4 MMOL/L (ref 22–29)
CREAT BLD-MCNC: 0.59 MG/DL (ref 0.57–1)
GFR SERPL CREATININE-BSD FRML MDRD: 107 ML/MIN/1.73
GLUCOSE BLD-MCNC: 81 MG/DL (ref 65–99)
GLUCOSE BLDC GLUCOMTR-MCNC: 93 MG/DL (ref 70–130)
POTASSIUM BLD-SCNC: 4.1 MMOL/L (ref 3.5–5.2)
SODIUM BLD-SCNC: 135 MMOL/L (ref 136–145)

## 2019-09-26 PROCEDURE — 94799 UNLISTED PULMONARY SVC/PX: CPT

## 2019-09-26 PROCEDURE — 82962 GLUCOSE BLOOD TEST: CPT

## 2019-09-26 PROCEDURE — 25010000002 ENOXAPARIN PER 10 MG: Performed by: FAMILY MEDICINE

## 2019-09-26 PROCEDURE — 80048 BASIC METABOLIC PNL TOTAL CA: CPT | Performed by: FAMILY MEDICINE

## 2019-09-26 PROCEDURE — 94668 MNPJ CHEST WALL SBSQ: CPT

## 2019-09-26 RX ORDER — IPRATROPIUM BROMIDE AND ALBUTEROL SULFATE 2.5; .5 MG/3ML; MG/3ML
3 SOLUTION RESPIRATORY (INHALATION) EVERY 4 HOURS PRN
Qty: 360 ML | Refills: 0 | Status: SHIPPED | OUTPATIENT
Start: 2019-09-26

## 2019-09-26 RX ORDER — FUROSEMIDE 40 MG/1
60 TABLET ORAL 2 TIMES DAILY
Qty: 100 TABLET | Refills: 0 | Status: ON HOLD | OUTPATIENT
Start: 2019-09-26 | End: 2019-10-05 | Stop reason: SDUPTHER

## 2019-09-26 RX ORDER — POTASSIUM CHLORIDE 1.5 G/1.77G
20 POWDER, FOR SOLUTION ORAL DAILY
Start: 2019-09-26

## 2019-09-26 RX ORDER — BUDESONIDE 0.5 MG/2ML
0.5 INHALANT ORAL
Qty: 60 EACH | Refills: 2 | Status: SHIPPED | OUTPATIENT
Start: 2019-09-26

## 2019-09-26 RX ADMIN — SODIUM CHLORIDE SOLN NEBU 7% 4 ML: 7 NEBU SOLN at 07:29

## 2019-09-26 RX ADMIN — ENOXAPARIN SODIUM 40 MG: 40 INJECTION SUBCUTANEOUS at 06:01

## 2019-09-26 RX ADMIN — BUDESONIDE 0.5 MG: 0.5 SUSPENSION RESPIRATORY (INHALATION) at 07:28

## 2019-09-26 RX ADMIN — POTASSIUM CHLORIDE 20 MEQ: 1.5 POWDER, FOR SOLUTION ORAL at 08:15

## 2019-09-26 RX ADMIN — DICLOFENAC 4 G: 10 GEL TOPICAL at 06:01

## 2019-09-26 RX ADMIN — FUROSEMIDE 60 MG: 40 TABLET ORAL at 08:15

## 2019-09-26 RX ADMIN — RISPERIDONE 2 MG: 1 TABLET ORAL at 08:15

## 2019-09-26 RX ADMIN — IPRATROPIUM BROMIDE AND ALBUTEROL SULFATE 3 ML: .5; 3 SOLUTION RESPIRATORY (INHALATION) at 07:34

## 2019-09-26 RX ADMIN — DIVALPROEX SODIUM 500 MG: 500 TABLET, EXTENDED RELEASE ORAL at 08:15

## 2019-09-26 RX ADMIN — NYSTATIN: 100000 POWDER TOPICAL at 08:15

## 2019-09-26 RX ADMIN — SODIUM CHLORIDE, PRESERVATIVE FREE 10 ML: 5 INJECTION INTRAVENOUS at 08:16

## 2019-09-26 RX ADMIN — ACETAMINOPHEN 650 MG: 325 TABLET ORAL at 04:29

## 2019-09-27 ENCOUNTER — READMISSION MANAGEMENT (OUTPATIENT)
Dept: CALL CENTER | Facility: HOSPITAL | Age: 49
End: 2019-09-27

## 2019-09-27 NOTE — OUTREACH NOTE
Prep Survey      Responses   Facility patient discharged from?  [place]   Is LACE score < 7 ?  No   Is patient eligible?  Yes   Discharge diagnosis  AMS, Weakness with multiple falls, A/C hypoxic hypercapnic resp. failure d/t AE COPD, anasarca, bilateral LE abdominal cellulitis,   Does the patient have one of the following disease processes/diagnoses(primary or secondary)?  COPD/Pneumonia   Does the patient have Home health ordered?  Yes   What is the Home health agency?   [facility]   Is there a DME ordered?  No   What DME was ordered?  Pt has home O2   Comments regarding appointments  See AVS   Prep survey completed?  Yes          Melina Garza RN

## 2019-09-30 ENCOUNTER — READMISSION MANAGEMENT (OUTPATIENT)
Dept: CALL CENTER | Facility: HOSPITAL | Age: 49
End: 2019-09-30

## 2019-09-30 NOTE — OUTREACH NOTE
COPD/PN Week 1 Survey      Responses   Facility patient discharged from?  [facility]   Does the patient have one of the following disease processes/diagnoses(primary or secondary)?  COPD/Pneumonia   Is there a successful TCM telephone encounter documented?  No   Was the primary reason for admission:  COPD exacerbation   Week 1 attempt successful?  No   Unsuccessful attempts  Attempt 1          Lakisha Brownlee RN

## 2019-10-02 ENCOUNTER — READMISSION MANAGEMENT (OUTPATIENT)
Dept: CALL CENTER | Facility: HOSPITAL | Age: 49
End: 2019-10-02

## 2019-10-02 NOTE — OUTREACH NOTE
COPD/PN Week 1 Survey      Responses   Facility patient discharged from?  [facility]   Does the patient have one of the following disease processes/diagnoses(primary or secondary)?  COPD/Pneumonia   Is there a successful TCM telephone encounter documented?  No   Was the primary reason for admission:  COPD exacerbation   Week 1 attempt successful?  No   Unsuccessful attempts  Attempt 2          Nadine Adams RN

## 2019-10-03 ENCOUNTER — READMISSION MANAGEMENT (OUTPATIENT)
Dept: CALL CENTER | Facility: HOSPITAL | Age: 49
End: 2019-10-03

## 2019-10-03 ENCOUNTER — HOSPITAL ENCOUNTER (INPATIENT)
Facility: HOSPITAL | Age: 49
LOS: 2 days | Discharge: HOME HEALTH | End: 2019-10-05
Attending: EMERGENCY MEDICINE | Admitting: FAMILY MEDICINE

## 2019-10-03 ENCOUNTER — APPOINTMENT (OUTPATIENT)
Dept: GENERAL RADIOLOGY | Facility: HOSPITAL | Age: 49
End: 2019-10-03

## 2019-10-03 DIAGNOSIS — T50.901A ACCIDENTAL DRUG OVERDOSE, INITIAL ENCOUNTER: Primary | ICD-10-CM

## 2019-10-03 DIAGNOSIS — N17.9 ACUTE RENAL FAILURE, UNSPECIFIED ACUTE RENAL FAILURE TYPE (HCC): ICD-10-CM

## 2019-10-03 DIAGNOSIS — I95.9 HYPOTENSION, UNSPECIFIED HYPOTENSION TYPE: ICD-10-CM

## 2019-10-03 LAB
ALBUMIN SERPL-MCNC: 3 G/DL (ref 3.5–5.2)
ALBUMIN/GLOB SERPL: 1.1 G/DL
ALP SERPL-CCNC: 76 U/L (ref 39–117)
ALT SERPL W P-5'-P-CCNC: 10 U/L (ref 1–33)
AMPHET+METHAMPHET UR QL: NEGATIVE
AMPHETAMINES UR QL: NEGATIVE
ANION GAP SERPL CALCULATED.3IONS-SCNC: 14.9 MMOL/L (ref 5–15)
APAP SERPL-MCNC: <5 MCG/ML (ref 10–30)
AST SERPL-CCNC: 21 U/L (ref 1–32)
BARBITURATES UR QL SCN: NEGATIVE
BASOPHILS # BLD AUTO: 0.07 10*3/MM3 (ref 0–0.2)
BASOPHILS NFR BLD AUTO: 0.6 % (ref 0–1.5)
BENZODIAZ UR QL SCN: NEGATIVE
BILIRUB SERPL-MCNC: 0.3 MG/DL (ref 0.2–1.2)
BILIRUB UR QL STRIP: ABNORMAL
BLASTS NFR BLD MANUAL: 0 % (ref 0–0)
BUN BLD-MCNC: 39 MG/DL (ref 6–20)
BUN/CREAT SERPL: 16 (ref 7–25)
BUPRENORPHINE SERPL-MCNC: NEGATIVE NG/ML
CALCIUM SPEC-SCNC: 9.1 MG/DL (ref 8.6–10.5)
CANNABINOIDS SERPL QL: NEGATIVE
CHLORIDE SERPL-SCNC: 86 MMOL/L (ref 98–107)
CLARITY UR: CLEAR
CO2 SERPL-SCNC: 25.1 MMOL/L (ref 22–29)
COCAINE UR QL: NEGATIVE
COLOR UR: YELLOW
CREAT BLD-MCNC: 2.43 MG/DL (ref 0.57–1)
DEPRECATED RDW RBC AUTO: 51.4 FL (ref 37–54)
EOSINOPHIL # BLD AUTO: 0.18 10*3/MM3 (ref 0–0.4)
EOSINOPHIL # BLD MANUAL: 0.11 10*3/MM3 (ref 0–0.4)
EOSINOPHIL NFR BLD AUTO: 1.7 % (ref 0.3–6.2)
EOSINOPHIL NFR BLD MANUAL: 1 % (ref 0.3–6.2)
ERYTHROCYTE [DISTWIDTH] IN BLOOD BY AUTOMATED COUNT: 15.7 % (ref 12.3–15.4)
ETHANOL BLD-MCNC: <10 MG/DL (ref 0–10)
ETHANOL UR QL: <0.01 %
GFR SERPL CREATININE-BSD FRML MDRD: 21 ML/MIN/1.73
GLOBULIN UR ELPH-MCNC: 2.7 GM/DL
GLUCOSE BLD-MCNC: 109 MG/DL (ref 65–99)
GLUCOSE UR STRIP-MCNC: NEGATIVE MG/DL
HCT VFR BLD AUTO: 30.6 % (ref 34–46.6)
HGB BLD-MCNC: 10 G/DL (ref 12–15.9)
HGB UR QL STRIP.AUTO: NEGATIVE
IMM GRANULOCYTES # BLD AUTO: 0.6 10*3/MM3 (ref 0–0.05)
IMM GRANULOCYTES NFR BLD AUTO: 5.5 % (ref 0–0.5)
KETONES UR QL STRIP: NEGATIVE
LEUKOCYTE ESTERASE UR QL STRIP.AUTO: NEGATIVE
LYMPHOCYTES # BLD AUTO: 1.6 10*3/MM3 (ref 0.7–3.1)
LYMPHOCYTES # BLD MANUAL: 1.2 10*3/MM3 (ref 0.7–3.1)
LYMPHOCYTES NFR BLD AUTO: 14.7 % (ref 19.6–45.3)
LYMPHOCYTES NFR BLD MANUAL: 10 % (ref 5–12)
LYMPHOCYTES NFR BLD MANUAL: 11 % (ref 19.6–45.3)
MCH RBC QN AUTO: 29.2 PG (ref 26.6–33)
MCHC RBC AUTO-ENTMCNC: 32.7 G/DL (ref 31.5–35.7)
MCV RBC AUTO: 89.5 FL (ref 79–97)
METAMYELOCYTES NFR BLD MANUAL: 4 % (ref 0–0)
METHADONE UR QL SCN: NEGATIVE
MONOCYTES # BLD AUTO: 1.09 10*3/MM3 (ref 0.1–0.9)
MONOCYTES # BLD AUTO: 1.19 10*3/MM3 (ref 0.1–0.9)
MONOCYTES NFR BLD AUTO: 10.9 % (ref 5–12)
MYELOCYTES NFR BLD MANUAL: 0 % (ref 0–0)
NEUTROPHILS # BLD AUTO: 7.26 10*3/MM3 (ref 1.7–7)
NEUTROPHILS # BLD AUTO: 8.07 10*3/MM3 (ref 1.7–7)
NEUTROPHILS NFR BLD AUTO: 66.6 % (ref 42.7–76)
NEUTROPHILS NFR BLD MANUAL: 71 % (ref 42.7–76)
NEUTS BAND NFR BLD MANUAL: 3 % (ref 0–5)
NITRITE UR QL STRIP: NEGATIVE
NRBC BLD AUTO-RTO: 0 /100 WBC (ref 0–0.2)
NT-PROBNP SERPL-MCNC: 273.7 PG/ML (ref 5–900)
OPIATES UR QL: POSITIVE
OTHER CELLS %: 0 % (ref 0–0)
OXYCODONE UR QL SCN: NEGATIVE
PCP UR QL SCN: NEGATIVE
PH UR STRIP.AUTO: <=5 [PH] (ref 4.5–8)
PLASMA CELL PREC NFR BLD MANUAL: 0 % (ref 0–0)
PLAT MORPH BLD: NORMAL
PLATELET # BLD AUTO: 376 10*3/MM3 (ref 140–450)
PMV BLD AUTO: 9.6 FL (ref 6–12)
POTASSIUM BLD-SCNC: 4.5 MMOL/L (ref 3.5–5.2)
PROLYMPHOCYTES NFR BLD MANUAL: 0 % (ref 0–0)
PROMYELOCYTES NFR BLD MANUAL: 0 % (ref 0–0)
PROPOXYPH UR QL: NEGATIVE
PROT SERPL-MCNC: 5.7 G/DL (ref 6–8.5)
PROT UR QL STRIP: NEGATIVE
RBC # BLD AUTO: 3.42 10*6/MM3 (ref 3.77–5.28)
RBC MORPH BLD: NORMAL
SODIUM BLD-SCNC: 126 MMOL/L (ref 136–145)
SP GR UR STRIP: 1.02 (ref 1–1.03)
TRICYCLICS UR QL SCN: NEGATIVE
TROPONIN T SERPL-MCNC: 0.03 NG/ML (ref 0–0.03)
UROBILINOGEN UR QL STRIP: ABNORMAL
VARIANT LYMPHS NFR BLD MANUAL: 0 % (ref 0–5)
WBC MORPH BLD: NORMAL
WBC NRBC COR # BLD: 10.9 10*3/MM3 (ref 3.4–10.8)

## 2019-10-03 PROCEDURE — 80306 DRUG TEST PRSMV INSTRMNT: CPT | Performed by: EMERGENCY MEDICINE

## 2019-10-03 PROCEDURE — 93010 ELECTROCARDIOGRAM REPORT: CPT | Performed by: INTERNAL MEDICINE

## 2019-10-03 PROCEDURE — 71046 X-RAY EXAM CHEST 2 VIEWS: CPT

## 2019-10-03 PROCEDURE — 85025 COMPLETE CBC W/AUTO DIFF WBC: CPT | Performed by: EMERGENCY MEDICINE

## 2019-10-03 PROCEDURE — G0378 HOSPITAL OBSERVATION PER HR: HCPCS

## 2019-10-03 PROCEDURE — 99284 EMERGENCY DEPT VISIT MOD MDM: CPT

## 2019-10-03 PROCEDURE — 80053 COMPREHEN METABOLIC PANEL: CPT | Performed by: EMERGENCY MEDICINE

## 2019-10-03 PROCEDURE — 83880 ASSAY OF NATRIURETIC PEPTIDE: CPT | Performed by: EMERGENCY MEDICINE

## 2019-10-03 PROCEDURE — 94640 AIRWAY INHALATION TREATMENT: CPT

## 2019-10-03 PROCEDURE — 93005 ELECTROCARDIOGRAM TRACING: CPT | Performed by: EMERGENCY MEDICINE

## 2019-10-03 PROCEDURE — 81003 URINALYSIS AUTO W/O SCOPE: CPT | Performed by: EMERGENCY MEDICINE

## 2019-10-03 PROCEDURE — 85007 BL SMEAR W/DIFF WBC COUNT: CPT | Performed by: EMERGENCY MEDICINE

## 2019-10-03 PROCEDURE — 99284 EMERGENCY DEPT VISIT MOD MDM: CPT | Performed by: EMERGENCY MEDICINE

## 2019-10-03 PROCEDURE — 80307 DRUG TEST PRSMV CHEM ANLYZR: CPT | Performed by: EMERGENCY MEDICINE

## 2019-10-03 PROCEDURE — 84484 ASSAY OF TROPONIN QUANT: CPT | Performed by: EMERGENCY MEDICINE

## 2019-10-03 PROCEDURE — P9612 CATHETERIZE FOR URINE SPEC: HCPCS

## 2019-10-03 RX ORDER — ONDANSETRON 4 MG/1
4 TABLET, FILM COATED ORAL EVERY 6 HOURS PRN
Status: DISCONTINUED | OUTPATIENT
Start: 2019-10-03 | End: 2019-10-05 | Stop reason: HOSPADM

## 2019-10-03 RX ORDER — ALLOPURINOL 300 MG/1
300 TABLET ORAL DAILY
Status: DISCONTINUED | OUTPATIENT
Start: 2019-10-04 | End: 2019-10-05 | Stop reason: HOSPADM

## 2019-10-03 RX ORDER — ACETAMINOPHEN 160 MG/5ML
650 SOLUTION ORAL EVERY 4 HOURS PRN
Status: DISCONTINUED | OUTPATIENT
Start: 2019-10-03 | End: 2019-10-05 | Stop reason: HOSPADM

## 2019-10-03 RX ORDER — ACETAMINOPHEN 325 MG/1
650 TABLET ORAL EVERY 4 HOURS PRN
Status: DISCONTINUED | OUTPATIENT
Start: 2019-10-03 | End: 2019-10-05 | Stop reason: HOSPADM

## 2019-10-03 RX ORDER — NICOTINE 21 MG/24HR
1 PATCH, TRANSDERMAL 24 HOURS TRANSDERMAL EVERY 24 HOURS
Status: DISCONTINUED | OUTPATIENT
Start: 2019-10-03 | End: 2019-10-05 | Stop reason: HOSPADM

## 2019-10-03 RX ORDER — SODIUM CHLORIDE 9 MG/ML
40 INJECTION, SOLUTION INTRAVENOUS AS NEEDED
Status: DISCONTINUED | OUTPATIENT
Start: 2019-10-03 | End: 2019-10-05 | Stop reason: HOSPADM

## 2019-10-03 RX ORDER — DIVALPROEX SODIUM 500 MG/1
500 TABLET, EXTENDED RELEASE ORAL ONCE
Status: COMPLETED | OUTPATIENT
Start: 2019-10-03 | End: 2019-10-03

## 2019-10-03 RX ORDER — NITROGLYCERIN 0.4 MG/1
0.4 TABLET SUBLINGUAL
Status: DISCONTINUED | OUTPATIENT
Start: 2019-10-03 | End: 2019-10-05 | Stop reason: HOSPADM

## 2019-10-03 RX ORDER — SODIUM CHLORIDE 0.9 % (FLUSH) 0.9 %
10 SYRINGE (ML) INJECTION AS NEEDED
Status: DISCONTINUED | OUTPATIENT
Start: 2019-10-03 | End: 2019-10-05 | Stop reason: HOSPADM

## 2019-10-03 RX ORDER — LEVOTHYROXINE SODIUM 0.03 MG/1
25 TABLET ORAL
Status: DISCONTINUED | OUTPATIENT
Start: 2019-10-04 | End: 2019-10-05 | Stop reason: HOSPADM

## 2019-10-03 RX ORDER — SODIUM CHLORIDE 0.9 % (FLUSH) 0.9 %
10 SYRINGE (ML) INJECTION EVERY 12 HOURS SCHEDULED
Status: DISCONTINUED | OUTPATIENT
Start: 2019-10-03 | End: 2019-10-05 | Stop reason: HOSPADM

## 2019-10-03 RX ORDER — NYSTATIN 100000 [USP'U]/G
POWDER TOPICAL 2 TIMES DAILY
Status: DISCONTINUED | OUTPATIENT
Start: 2019-10-03 | End: 2019-10-05 | Stop reason: HOSPADM

## 2019-10-03 RX ORDER — ACETAMINOPHEN AND CODEINE PHOSPHATE 300; 30 MG/1; MG/1
1 TABLET ORAL EVERY 6 HOURS PRN
Status: ON HOLD | COMMUNITY
End: 2019-10-03

## 2019-10-03 RX ORDER — DIVALPROEX SODIUM 250 MG/1
TABLET, EXTENDED RELEASE ORAL
Status: COMPLETED
Start: 2019-10-03 | End: 2019-10-03

## 2019-10-03 RX ORDER — BUDESONIDE 0.5 MG/2ML
0.5 INHALANT ORAL
Status: DISCONTINUED | OUTPATIENT
Start: 2019-10-03 | End: 2019-10-05 | Stop reason: HOSPADM

## 2019-10-03 RX ORDER — ACETAMINOPHEN 650 MG/1
650 SUPPOSITORY RECTAL EVERY 4 HOURS PRN
Status: DISCONTINUED | OUTPATIENT
Start: 2019-10-03 | End: 2019-10-05 | Stop reason: HOSPADM

## 2019-10-03 RX ORDER — ONDANSETRON 2 MG/ML
4 INJECTION INTRAMUSCULAR; INTRAVENOUS EVERY 6 HOURS PRN
Status: DISCONTINUED | OUTPATIENT
Start: 2019-10-03 | End: 2019-10-05 | Stop reason: HOSPADM

## 2019-10-03 RX ORDER — SODIUM CHLORIDE 9 MG/ML
125 INJECTION, SOLUTION INTRAVENOUS CONTINUOUS
Status: DISCONTINUED | OUTPATIENT
Start: 2019-10-03 | End: 2019-10-05

## 2019-10-03 RX ORDER — SODIUM CHLORIDE 9 MG/ML
250 INJECTION, SOLUTION INTRAVENOUS CONTINUOUS
Status: DISCONTINUED | OUTPATIENT
Start: 2019-10-03 | End: 2019-10-03

## 2019-10-03 RX ORDER — CALCIUM CARBONATE 200(500)MG
1 TABLET,CHEWABLE ORAL 2 TIMES DAILY PRN
Status: DISCONTINUED | OUTPATIENT
Start: 2019-10-03 | End: 2019-10-05 | Stop reason: HOSPADM

## 2019-10-03 RX ORDER — SODIUM CHLORIDE 0.9 % (FLUSH) 0.9 %
1-10 SYRINGE (ML) INJECTION AS NEEDED
Status: DISCONTINUED | OUTPATIENT
Start: 2019-10-03 | End: 2019-10-05 | Stop reason: HOSPADM

## 2019-10-03 RX ORDER — IPRATROPIUM BROMIDE AND ALBUTEROL SULFATE 2.5; .5 MG/3ML; MG/3ML
3 SOLUTION RESPIRATORY (INHALATION)
Status: DISCONTINUED | OUTPATIENT
Start: 2019-10-03 | End: 2019-10-05 | Stop reason: HOSPADM

## 2019-10-03 RX ORDER — BISACODYL 5 MG/1
5 TABLET, DELAYED RELEASE ORAL DAILY PRN
Status: DISCONTINUED | OUTPATIENT
Start: 2019-10-03 | End: 2019-10-05 | Stop reason: HOSPADM

## 2019-10-03 RX ADMIN — NICOTINE 1 PATCH: 21 PATCH TRANSDERMAL at 22:30

## 2019-10-03 RX ADMIN — SODIUM CHLORIDE 250 ML/HR: 9 INJECTION, SOLUTION INTRAVENOUS at 20:28

## 2019-10-03 RX ADMIN — SODIUM CHLORIDE 1000 ML: 900 INJECTION, SOLUTION INTRAVENOUS at 19:05

## 2019-10-03 RX ADMIN — DIVALPROEX SODIUM 500 MG: 250 TABLET, EXTENDED RELEASE ORAL at 22:30

## 2019-10-03 RX ADMIN — IPRATROPIUM BROMIDE AND ALBUTEROL SULFATE 3 ML: .5; 3 SOLUTION RESPIRATORY (INHALATION) at 22:59

## 2019-10-03 RX ADMIN — BUDESONIDE 0.5 MG: 0.5 SUSPENSION RESPIRATORY (INHALATION) at 22:59

## 2019-10-03 RX ADMIN — DIVALPROEX SODIUM 500 MG: 500 TABLET, EXTENDED RELEASE ORAL at 22:30

## 2019-10-03 RX ADMIN — SODIUM CHLORIDE 150 ML/HR: 9 INJECTION, SOLUTION INTRAVENOUS at 22:32

## 2019-10-03 NOTE — OUTREACH NOTE
COPD/PN Week 2 Survey      Responses   Facility patient discharged from?  [facility]   Does the patient have one of the following disease processes/diagnoses(primary or secondary)?  COPD/Pneumonia   Was the primary reason for admission:  COPD exacerbation   Week 2 attempt successful?  No   Unsuccessful attempts  Attempt 1          Rosalba Yoon RN

## 2019-10-04 ENCOUNTER — READMISSION MANAGEMENT (OUTPATIENT)
Dept: CALL CENTER | Facility: HOSPITAL | Age: 49
End: 2019-10-04

## 2019-10-04 LAB
ANION GAP SERPL CALCULATED.3IONS-SCNC: 15.6 MMOL/L (ref 5–15)
BUN BLD-MCNC: 36 MG/DL (ref 6–20)
BUN/CREAT SERPL: 24.5 (ref 7–25)
CALCIUM SPEC-SCNC: 8.7 MG/DL (ref 8.6–10.5)
CHLORIDE SERPL-SCNC: 93 MMOL/L (ref 98–107)
CO2 SERPL-SCNC: 25.4 MMOL/L (ref 22–29)
CREAT BLD-MCNC: 1.47 MG/DL (ref 0.57–1)
GFR SERPL CREATININE-BSD FRML MDRD: 37 ML/MIN/1.73
GLUCOSE BLD-MCNC: 81 MG/DL (ref 65–99)
GLUCOSE BLDC GLUCOMTR-MCNC: 166 MG/DL (ref 70–130)
POTASSIUM BLD-SCNC: 3.7 MMOL/L (ref 3.5–5.2)
SODIUM BLD-SCNC: 134 MMOL/L (ref 136–145)

## 2019-10-04 PROCEDURE — 80048 BASIC METABOLIC PNL TOTAL CA: CPT | Performed by: FAMILY MEDICINE

## 2019-10-04 PROCEDURE — 94660 CPAP INITIATION&MGMT: CPT

## 2019-10-04 PROCEDURE — 25010000002 ENOXAPARIN PER 10 MG: Performed by: FAMILY MEDICINE

## 2019-10-04 PROCEDURE — 94799 UNLISTED PULMONARY SVC/PX: CPT

## 2019-10-04 PROCEDURE — 97162 PT EVAL MOD COMPLEX 30 MIN: CPT

## 2019-10-04 PROCEDURE — 82962 GLUCOSE BLOOD TEST: CPT

## 2019-10-04 RX ADMIN — NICOTINE 1 PATCH: 21 PATCH TRANSDERMAL at 21:51

## 2019-10-04 RX ADMIN — SODIUM CHLORIDE 125 ML/HR: 9 INJECTION, SOLUTION INTRAVENOUS at 17:22

## 2019-10-04 RX ADMIN — IPRATROPIUM BROMIDE AND ALBUTEROL SULFATE 3 ML: .5; 3 SOLUTION RESPIRATORY (INHALATION) at 10:52

## 2019-10-04 RX ADMIN — SODIUM CHLORIDE, PRESERVATIVE FREE 10 ML: 5 INJECTION INTRAVENOUS at 20:08

## 2019-10-04 RX ADMIN — DICLOFENAC 4 G: 10 GEL TOPICAL at 17:20

## 2019-10-04 RX ADMIN — SODIUM CHLORIDE 150 ML/HR: 9 INJECTION, SOLUTION INTRAVENOUS at 02:04

## 2019-10-04 RX ADMIN — DICLOFENAC 4 G: 10 GEL TOPICAL at 23:51

## 2019-10-04 RX ADMIN — ALLOPURINOL 300 MG: 300 TABLET ORAL at 09:52

## 2019-10-04 RX ADMIN — Medication 10 ML: at 09:53

## 2019-10-04 RX ADMIN — IPRATROPIUM BROMIDE AND ALBUTEROL SULFATE 3 ML: .5; 3 SOLUTION RESPIRATORY (INHALATION) at 19:03

## 2019-10-04 RX ADMIN — NYSTATIN: 100000 POWDER TOPICAL at 09:53

## 2019-10-04 RX ADMIN — IPRATROPIUM BROMIDE AND ALBUTEROL SULFATE 3 ML: .5; 3 SOLUTION RESPIRATORY (INHALATION) at 23:07

## 2019-10-04 RX ADMIN — NYSTATIN: 100000 POWDER TOPICAL at 20:07

## 2019-10-04 RX ADMIN — IPRATROPIUM BROMIDE AND ALBUTEROL SULFATE 3 ML: .5; 3 SOLUTION RESPIRATORY (INHALATION) at 15:20

## 2019-10-04 RX ADMIN — SODIUM CHLORIDE 125 ML/HR: 9 INJECTION, SOLUTION INTRAVENOUS at 09:57

## 2019-10-04 RX ADMIN — ENOXAPARIN SODIUM 30 MG: 30 INJECTION SUBCUTANEOUS at 09:52

## 2019-10-04 RX ADMIN — IPRATROPIUM BROMIDE AND ALBUTEROL SULFATE 3 ML: .5; 3 SOLUTION RESPIRATORY (INHALATION) at 07:05

## 2019-10-04 RX ADMIN — SODIUM CHLORIDE, PRESERVATIVE FREE 10 ML: 5 INJECTION INTRAVENOUS at 09:52

## 2019-10-04 RX ADMIN — IPRATROPIUM BROMIDE AND ALBUTEROL SULFATE 3 ML: .5; 3 SOLUTION RESPIRATORY (INHALATION) at 03:08

## 2019-10-04 RX ADMIN — ACETAMINOPHEN 650 MG: 325 TABLET, FILM COATED ORAL at 20:07

## 2019-10-04 RX ADMIN — DICLOFENAC 4 G: 10 GEL TOPICAL at 12:17

## 2019-10-04 RX ADMIN — BUDESONIDE 0.5 MG: 0.5 SUSPENSION RESPIRATORY (INHALATION) at 07:05

## 2019-10-04 RX ADMIN — LEVOTHYROXINE SODIUM 25 MCG: 25 TABLET ORAL at 06:49

## 2019-10-04 RX ADMIN — BUDESONIDE 0.5 MG: 0.5 SUSPENSION RESPIRATORY (INHALATION) at 19:03

## 2019-10-04 NOTE — OUTREACH NOTE
COPD/PN Week 2 Survey      Responses   Facility patient discharged from?  [facility]   Does the patient have one of the following disease processes/diagnoses(primary or secondary)?  COPD/Pneumonia   Was the primary reason for admission:  COPD exacerbation   Week 2 attempt successful?  No   Revoke  Readmitted          Glendy Barron RN

## 2019-10-05 VITALS
BODY MASS INDEX: 47.34 KG/M2 | HEART RATE: 76 BPM | SYSTOLIC BLOOD PRESSURE: 144 MMHG | HEIGHT: 64 IN | WEIGHT: 277.3 LBS | OXYGEN SATURATION: 96 % | RESPIRATION RATE: 20 BRPM | TEMPERATURE: 98.6 F | DIASTOLIC BLOOD PRESSURE: 82 MMHG

## 2019-10-05 LAB
ANION GAP SERPL CALCULATED.3IONS-SCNC: 10.5 MMOL/L (ref 5–15)
BUN BLD-MCNC: 27 MG/DL (ref 6–20)
BUN/CREAT SERPL: 37 (ref 7–25)
CALCIUM SPEC-SCNC: 9.1 MG/DL (ref 8.6–10.5)
CHLORIDE SERPL-SCNC: 99 MMOL/L (ref 98–107)
CO2 SERPL-SCNC: 26.5 MMOL/L (ref 22–29)
CREAT BLD-MCNC: 0.73 MG/DL (ref 0.57–1)
DEPRECATED RDW RBC AUTO: 50.9 FL (ref 37–54)
ERYTHROCYTE [DISTWIDTH] IN BLOOD BY AUTOMATED COUNT: 15.6 % (ref 12.3–15.4)
GFR SERPL CREATININE-BSD FRML MDRD: 84 ML/MIN/1.73
GLUCOSE BLD-MCNC: 84 MG/DL (ref 65–99)
HCT VFR BLD AUTO: 29.5 % (ref 34–46.6)
HGB BLD-MCNC: 9.6 G/DL (ref 12–15.9)
MCH RBC QN AUTO: 29.1 PG (ref 26.6–33)
MCHC RBC AUTO-ENTMCNC: 32.5 G/DL (ref 31.5–35.7)
MCV RBC AUTO: 89.4 FL (ref 79–97)
PLATELET # BLD AUTO: 417 10*3/MM3 (ref 140–450)
PMV BLD AUTO: 9 FL (ref 6–12)
POTASSIUM BLD-SCNC: 3.8 MMOL/L (ref 3.5–5.2)
RBC # BLD AUTO: 3.3 10*6/MM3 (ref 3.77–5.28)
SODIUM BLD-SCNC: 136 MMOL/L (ref 136–145)
WBC NRBC COR # BLD: 7.43 10*3/MM3 (ref 3.4–10.8)

## 2019-10-05 PROCEDURE — 97116 GAIT TRAINING THERAPY: CPT

## 2019-10-05 PROCEDURE — 80048 BASIC METABOLIC PNL TOTAL CA: CPT | Performed by: FAMILY MEDICINE

## 2019-10-05 PROCEDURE — 94799 UNLISTED PULMONARY SVC/PX: CPT

## 2019-10-05 PROCEDURE — 85027 COMPLETE CBC AUTOMATED: CPT | Performed by: FAMILY MEDICINE

## 2019-10-05 PROCEDURE — 25010000002 ENOXAPARIN PER 10 MG: Performed by: FAMILY MEDICINE

## 2019-10-05 RX ORDER — FUROSEMIDE 40 MG/1
40 TABLET ORAL 2 TIMES DAILY
Qty: 100 TABLET | Refills: 0 | Status: SHIPPED | OUTPATIENT
Start: 2019-10-05

## 2019-10-05 RX ADMIN — ACETAMINOPHEN 650 MG: 325 TABLET, FILM COATED ORAL at 10:30

## 2019-10-05 RX ADMIN — BUDESONIDE 0.5 MG: 0.5 SUSPENSION RESPIRATORY (INHALATION) at 07:24

## 2019-10-05 RX ADMIN — ALLOPURINOL 300 MG: 300 TABLET ORAL at 08:31

## 2019-10-05 RX ADMIN — IPRATROPIUM BROMIDE AND ALBUTEROL SULFATE 3 ML: .5; 3 SOLUTION RESPIRATORY (INHALATION) at 11:08

## 2019-10-05 RX ADMIN — DICLOFENAC 4 G: 10 GEL TOPICAL at 12:36

## 2019-10-05 RX ADMIN — IPRATROPIUM BROMIDE AND ALBUTEROL SULFATE 3 ML: .5; 3 SOLUTION RESPIRATORY (INHALATION) at 02:42

## 2019-10-05 RX ADMIN — DICLOFENAC 4 G: 10 GEL TOPICAL at 05:44

## 2019-10-05 RX ADMIN — LEVOTHYROXINE SODIUM 25 MCG: 25 TABLET ORAL at 05:44

## 2019-10-05 RX ADMIN — SODIUM CHLORIDE 125 ML/HR: 9 INJECTION, SOLUTION INTRAVENOUS at 05:52

## 2019-10-05 RX ADMIN — ENOXAPARIN SODIUM 30 MG: 30 INJECTION SUBCUTANEOUS at 08:31

## 2019-10-05 RX ADMIN — SODIUM CHLORIDE, PRESERVATIVE FREE 10 ML: 5 INJECTION INTRAVENOUS at 08:32

## 2019-10-05 RX ADMIN — IPRATROPIUM BROMIDE AND ALBUTEROL SULFATE 3 ML: .5; 3 SOLUTION RESPIRATORY (INHALATION) at 07:24

## 2019-10-05 RX ADMIN — NYSTATIN 1 APPLICATION: 100000 POWDER TOPICAL at 08:31

## 2019-10-06 ENCOUNTER — READMISSION MANAGEMENT (OUTPATIENT)
Dept: CALL CENTER | Facility: HOSPITAL | Age: 49
End: 2019-10-06

## 2019-10-06 NOTE — OUTREACH NOTE
Prep Survey      Responses   Facility patient discharged from?  [facility]   Is LACE score < 7 ?  No   Is patient eligible?  Yes   Discharge diagnosis  Mental status changes secondary to accidental drug overdose,  acute kidney injury   Does the patient have one of the following disease processes/diagnoses(primary or secondary)?  Other   Does the patient have Home health ordered?  Yes   What is the Home health agency?   [facility]   Is there a DME ordered?  No   What DME was ordered?  Pt has home O2   Comments regarding appointments  See AVS   Medication alerts for this patient  lasix   Prep survey completed?  Yes          Evie Barron RN

## 2019-10-07 ENCOUNTER — READMISSION MANAGEMENT (OUTPATIENT)
Dept: CALL CENTER | Facility: HOSPITAL | Age: 49
End: 2019-10-07

## 2019-10-07 NOTE — OUTREACH NOTE
Medical Week 1 Survey      Responses   Facility patient discharged from?  [facility]   Does the patient have one of the following disease processes/diagnoses(primary or secondary)?  Other   Is there a successful TCM telephone encounter documented?  No   Week 1 attempt successful?  No   Unsuccessful attempts  Attempt 1          Irasema Weinstein RN

## 2019-10-09 ENCOUNTER — READMISSION MANAGEMENT (OUTPATIENT)
Dept: CALL CENTER | Facility: HOSPITAL | Age: 49
End: 2019-10-09

## 2019-10-09 NOTE — OUTREACH NOTE
Medical Week 1 Survey      Responses   Facility patient discharged from?  [facility]   Does the patient have one of the following disease processes/diagnoses(primary or secondary)?  Other   Is there a successful TCM telephone encounter documented?  No   Week 1 attempt successful?  No   Unsuccessful attempts  Attempt 2          Gamal Norman RN

## 2019-10-14 ENCOUNTER — READMISSION MANAGEMENT (OUTPATIENT)
Dept: CALL CENTER | Facility: HOSPITAL | Age: 49
End: 2019-10-14

## 2019-10-14 NOTE — OUTREACH NOTE
Medical Week 2 Survey      Responses   Facility patient discharged from?  [facility]   Does the patient have one of the following disease processes/diagnoses(primary or secondary)?  Other   Week 2 attempt successful?  No   Unsuccessful attempts  Attempt 1          Shannon Owusu RN

## 2019-10-14 NOTE — DISCHARGE SUMMARY
Kirsten Centeno  1970  [id number]        Discharge Summary    Date of Admission: 9/16/2019  Date of Discharge:  09/26/19  Primary Discharge Diagnoses:   Acute on chronic hypoxic hypercapnic respiratory failure multifactorial due to hypoventilation, acute expansion COPD, noncompliance  Acute metabolic encephalopathy likely due to medication overdose  Weakness and multiple falls likely due to unintentional overdose of pain medications  Bilateral lower extremity cellulitis    Secondary Discharge Diagnoses:      Anasarca likely due to chronic cor pulmonale  Hypothyroidism  Adult onset diabetes mellitus  Obstructive sleep apnea  Bipolar disorder  Obesity      PCP  Patient Care Team:  Rob Cunha MD as PCP - General  Rob Cunha MD as PCP - Family Medicine    Consults:   Consults     No orders found from 8/18/2019 to 9/17/2019.            History of Present Illness:  49-year-old white female morbidly obese history of COPD chronic hypoxic respiratory failure on home O2 2 L who presented the emergency room with a 3 to 4-day history of worsening weakness inability to ambulate some confusion and several falls in the last 48 hours.  Denies any loss of consciousness but she did bump her head when she fell and hit the door this morning.  Patient is supposed be on continuous O2 but admits to not be as compliant with this as she needs to be.  Patient called EMS was found to have sats in the mid 70s was placed on O2 and sats came up to mid 90s.  She denies any fever chills cough congestion chest pain.  She has complained of increasing lower extremity edema she has been taking a diuretic but did not call her primary care physician.    Hospital Course  Patient initially admitted to the [facility] telemetry bed and home medications were continued and she was started on round-the-clock me nebs aggressive pulmonary toilet and IV diuretics.  Patient's respiratory decompensation significant hypercapnia was transferred to the ICU  and placed on BiPAP continuously.  She was significantly fluid overloaded and started on IV diuretics.  Patient had some anterior leg redness and abdominal wall edema and redness and was started on IV Rocephin for possible cellulitis.      Pulmonary saw patient consultation for management of her respiratory failure.  Patient was transitioned from BiPAP to high heated flow oxygen on 9/18/2019 and maintain her sats in the high 80s.  She was continued on BiPAP at night.    Her confusion resolved and she is back to baseline.  Scan showed extensive bilateral lower lobe atelectasis.  Her edema improved abdominal edema improved redness resolved.  Her oxygen requirements gradually decreased she is transferred to the floor.    Transition to p.o. antibiotics and tolerated well.  She developed a rash on her lower extremities converted to a drug reaction versus a contact dermatitis from new detergent.  This resolved she is discharged home satisfied condition she was strongly urged not to take any pain medications after discharge and be compliant with her fluid restriction and her BiPAP.    Operations and Procedures Performed:       Xr Chest 2 View    Result Date: 10/3/2019  Narrative: CR Chest 2 Vws INDICATION:  49-year-old female with shortness of air today. Drug overdose at home today. COPD. COMPARISON:  Chest x-ray 9/23/2019 FINDINGS: PA and lateral views of the chest.  Left basilar infiltrate with suspected small left pleural effusion. The right lung appears predominantly clear. No pneumothorax. Heart size and mediastinum are within normal limits. Pulmonary vasculature is unremarkable.      Impression: Left basilar infiltrate with suspected small left pleural effusion. Signer Name: Homar Turpin MD  Signed: 10/3/2019 7:50 PM  Workstation Name: [email or web address]-  [facility]    Xr Chest 2 View    Result Date: 9/16/2019  Narrative: PA AND LATERAL CHEST, 9/16/2019 3:13 PM  HISTORY: weakness, cough, SOB   COMPARISON: 06/18/2018  TECHNIQUE: PA and lateral upright chest series.  FINDINGS: Heart size and pulmonary vascularity are normal. The lungs are expanded and clear. No visible pulmonary infiltrate or pleural effusion.       Impression: Negative chest.  This report was finalized on 9/16/2019 3:27 PM by Dr. Santana Lee MD.      Ct Angiogram Chest With & Without Contrast    Result Date: 9/18/2019  Narrative: CT CHEST WITH CONTRAST, PE PROTOCOL, 09/18/2019  HISTORY: 49-year-old female admitted to the hospital with acute on chronic respiratory failure. Hypoxia. Reported history of COPD.  TECHNIQUE:  CT examination of the chest with IV contrast. CTA MIP multiplanar pulmonary artery images were reformatted with 3-D postprocessing. Radiation dose reduction techniques included automated exposure control or exposure modulation based on body size. Radiation audit for CT and nuclear cardiology exams in the last 12 months: 0.  FINDINGS:  The examination shows complete collapse of the right lower lobe and most of the left lower lobe. Right middle lobe and both upper lobes are expanded and clear. Lung bases were expanded and clear on chest x-ray 2 days ago. There is central opacification of both lower lobe bronchi, and I suspect endobronchial mucus or fluid.  No pulmonary motion. Normal caliber thoracic aorta. Heart size and pulmonary vascularity are normal. No pericardial effusion.  No significant emphysematous changes are visible in the upper lungs. Mildly enlarged anterior and middle mediastinal lymph nodes are nonspecific but may be reactive in the current clinical setting. I see no obstructing central mass or adenopathy in the hilar regions.      Impression: 1.  Complete collapse and consolidation of both lower lobes, new since chest x-ray 2 days ago. Consider endobronchial mucous plugging. 2.  Mid and upper lungs are clear. Pulmonary vascularity is normal. No visible pulmonary edema. No pleural effusion. 3.  Prominent,  nonspecific mediastinal and hilar lymph nodes, but no visible centrally obstructing mass or adenopathy. 4.  No evidence of pulmonary embolism or other acute vascular abnormality within the chest. 5.  Stat final copy of this report was sent to the ordering physician's office immediately following this dictation with telephone notification and documentation.  This report was finalized on 9/18/2019 10:30 AM by Dr. Wolf Flanagan MD.      Xr Chest 1 View    Result Date: 9/21/2019  Narrative: CR Chest 1 Vw INDICATION: Generalized weakness COMPARISON:  9/16/2019 FINDINGS: Single portable AP view(s) of the chest. Redemonstrated and perhaps slightly worsened mild bibasilar densities, right greater than left. Suspect bibasilar infiltrate or atelectasis. Signer Name: Eyal Hitchcock MD  Signed: 9/21/2019 9:46 AM  Workstation Name: [email or web address]-  [facility] Venous Doppler Lower Extremity Bilateral (duplex)    Result Date: 9/18/2019  Narrative: VENOUS DOPPLER ULTRASOUND, BILATERAL LOWER EXTREMITIES, 09/18/2019  HISTORY: 49-year-old female admitted to the hospital with acute on chronic respiratory failure. Hypoxia. Three day history shortness of air. Evaluate for DVT as a source for potential pulmonary embolism.  TECHNIQUE: Venous Doppler ultrasound examination of both legs was performed using grey-scale, spectral Doppler, and color flow Doppler ultrasound imaging.  FINDINGS: The examination is negative.  There is no evidence of deep venous thrombosis from the groin to the lower calf bilaterally. The greater saphenous veins are also patent.      Impression: Negative examination.  No evidence of lower extremity DVT on the right or left.  This report was finalized on 9/18/2019 4:09 PM by Dr. Wolf Flanagan MD.      Xr Chest Pa & Lateral    Result Date: 9/23/2019  Narrative: CHEST X-RAY, 09/23/2019     HISTORY: 49-year-old female hospital inpatient with acute on chronic hypoxemic and hypercapnic  respiratory failure. Bilateral lower lobe atelectasis on CT chest. Follow-up pulmonary status.  TECHNIQUE: PA and lateral upright chest x-ray.  FINDINGS: Persistent dense infiltrate and/or volume loss involving both lower lobes, greater on the right. Radiographically, this is unchanged since chest x-ray 09/21/2019. Upper lungs remain clear. Heart size is normal.      Impression: 1.  Persistent bilateral lower lobe infiltrate and volume loss. 2.  No significant change since 09/21/2019.  This report was finalized on 9/23/2019 8:40 AM by Dr. Wolf Flanagan MD.        Labs:        Invalid input(s): NEUTOPHILPCT        Invalid input(s): LABALBU, PROT        Lab Results (last 24 hours)     Procedure Component Value Units Date/Time    POC Glucose Once [[id number]]  (Normal) Collected:  09/26/19 0726    Specimen:  Blood Updated:  09/26/19 0733     Glucose 93 mg/dL     Basic Metabolic Panel [[id number]]  (Abnormal) Collected:  09/26/19 0410    Specimen:  Blood Updated:  09/26/19 0534     Glucose 81 mg/dL      BUN 13 mg/dL      Creatinine 0.59 mg/dL      Sodium 135 mmol/L      Potassium 4.1 mmol/L      Chloride 94 mmol/L      CO2 29.4 mmol/L      Calcium 9.3 mg/dL      eGFR Non African Amer 107 mL/min/1.73      BUN/Creatinine Ratio 22.0     Anion Gap 11.6 mmol/L     Narrative:       GFR Normal >60  Chronic Kidney Disease <60  Kidney Failure <15    POC Glucose Once [[id number]]  (Abnormal) Collected:  09/25/19 1940    Specimen:  Blood Updated:  09/25/19 1946     Glucose 139 mg/dL                                   Invalid input(s): LDLCALC          No results found for: POCGLU                      Radiology:  Imaging Results (last 24 hours)     ** No results found for the last 24 hours. **          PROCEDURES          Allergies:  is allergic to penicillins.    Antonio  reviewed    Discharge Medications:     Your medication list      START taking these medications      Instructions Last Dose Given Next Dose Due   budesonide 0.5  MG/2ML nebulizer solution  Commonly known as:  PULMICORT      Take 2 mL by nebulization 2 (Two) Times a Day.       diclofenac 1 % gel gel  Commonly known as:  VOLTAREN      Apply 4 g topically to the appropriate area as directed Every 6 (Six) Hours.       ipratropium-albuterol 0.5-2.5 mg/3 ml nebulizer  Commonly known as:  DUO-NEB      Take 3 mL by nebulization Every 4 (Four) Hours As Needed for Shortness of Air.       potassium chloride 20 MEQ packet  Commonly known as:  KLOR-CON      Take 20 mEq by mouth Daily.          CHANGE how you take these medications      Instructions Last Dose Given Next Dose Due   divalproex 500 MG 24 hr tablet  Commonly known as:  DEPAKOTE  What changed:  Another medication with the same name was removed. Continue taking this medication, and follow the directions you see here.      500 mg 1 (One) Time. Patient takes 1 tablet in am and 2 tablet at bedtime       furosemide 40 MG tablet  Commonly known as:  LASIX  What changed:    · medication strength  · how much to take  · when to take this      Take 1.5 tablets by mouth 2 (Two) Times a Day.          CONTINUE taking these medications      Instructions Last Dose Given Next Dose Due   allopurinol 300 MG tablet  Commonly known as:  ZYLOPRIM      Take 300 mg by mouth Daily.       levothyroxine 25 MCG tablet  Commonly known as:  SYNTHROID, LEVOTHROID           meloxicam 15 MG tablet  Commonly known as:  MOBIC      Take 15 mg by mouth.       nystatin [id number] UNIT/GM powder  Commonly known as:  MYCOSTATIN      Apply  topically to the appropriate area as directed 2 (Two) Times a Day.       O2  Commonly known as:  OXYGEN      Inhale 2 L/min Every Night.       OMEGA 3 PO      Take  by mouth Daily.       omeprazole 40 MG capsule  Commonly known as:  priLOSEC      Take 40 mg by mouth Daily.       pravastatin 40 MG tablet  Commonly known as:  PRAVACHOL      Take 40 mg by mouth Daily.       pregabalin 75 MG capsule  Commonly known as:  LYRICA      Take  75 mg by mouth 2 (Two) Times a Day.       risperiDONE 2 MG tablet  Commonly known as:  risperDAL      Take 2 mg by mouth 2 (Two) Times a Day.       spironolactone 25 MG tablet  Commonly known as:  ALDACTONE      Take 25 mg by mouth Daily.       tiZANidine 4 MG tablet  Commonly known as:  ZANAFLEX      Take 4 mg by mouth At Night As Needed for Muscle Spasms.          STOP taking these medications    albuterol sulfate  (90 Base) MCG/ACT inhaler  Commonly known as:  PROVENTIL HFA;VENTOLIN HFA;PROAIR HFA        amphetamine-dextroamphetamine 20 MG tablet  Commonly known as:  ADDERALL        cefdinir 300 MG capsule  Commonly known as:  OMNICEF        eszopiclone 3 MG tablet  Commonly known as:  LUNESTA        HYDROcodone-acetaminophen 7.5-325 MG per tablet  Commonly known as:  NORCO        hydrOXYzine pamoate 25 MG capsule  Commonly known as:  VISTARIL        olmesartan 40 MG tablet  Commonly known as:  BENICAR        perphenazine 8 MG tablet  Commonly known as:  TRILAFON        pioglitazone 15 MG tablet  Commonly known as:  ACTOS              Where to Get Your Medications      These medications were sent to [place], [place] - [address] [address] - [phone]  - [phone]   [address] [facility] [address]    Phone:  [phone]   · budesonide 0.5 MG/2ML nebulizer solution  · diclofenac 1 % gel gel  · furosemide 40 MG tablet  · ipratropium-albuterol 0.5-2.5 mg/3 ml nebulizer     Information about where to get these medications is not yet available    Ask your nurse or doctor about these medications  · potassium chloride 20 MEQ packet           Condition on Discharge:  stable    Discharge Disposition  Stable    Visiting Nurse:    Yes     Home PT/OT:  No     Home Safety Evaluation:  No     DME  None    Discharge Diet:        Activity at Discharge:  Tolerated      Follow-up Appointments:  Additional Instructions for the Follow-ups that You Need to Schedule     Ambulatory Referral to Home Health    As directed      Face to Face Visit Date:  9/26/2019    Follow-up provider for Plan of Care?:  I treated the patient in an acute care facility and will not continue treatment after discharge.    Follow-up provider:  TRI CUNHA [[id number]]    Reason/Clinical Findings:  copd    Describe mobility limitations that make leaving home difficult:  unable to drive    Nursing/Therapeutic Services Requested:  Skilled Nursing    Skilled nursing orders:  O2 instruction COPD management    Frequency:  1 Week 1         Call MD for problems / concerns.   As directed      Discharge Follow-up with PCP   As directed       Currently Documented PCP:    Tri Cunha MD    PCP Phone Number:    [phone]     Follow Up Details:  one week            Contact information for follow-up providers     Tono Holman MD. Schedule an appointment as soon as possible for a visit in 1 month(s).    Specialty:  Pulmonary Disease  Contact information:  [address]  [facility] [address]  [phone]             Tri Cunha MD. Go on 10/3/2019.    Specialty:  Family Medicine  Why:  1:15 PM  Contact information:  [address] [place] [address]  [phone]                   Contact information for after-discharge care     Home Medical Care     [facility] Follow up.    Service:  Home Health Services  Contact information:  [address]  [facility] [address] [phone]                             Test Results Pending at Discharge       Anitra Reinoso MD  10/14/19  7:49 AM    Much of this encounter note is an electronic transcription/translation of spoken language to printed text using Dragon Software

## 2019-10-15 ENCOUNTER — READMISSION MANAGEMENT (OUTPATIENT)
Dept: CALL CENTER | Facility: HOSPITAL | Age: 49
End: 2019-10-15

## 2019-10-15 NOTE — OUTREACH NOTE
Medical Week 2 Survey      Responses   Facility patient discharged from?  [facility]   Does the patient have one of the following disease processes/diagnoses(primary or secondary)?  Other   Week 2 attempt successful?  No   Unsuccessful attempts  Attempt 2          Shannon Owusu RN

## 2019-10-30 ENCOUNTER — LAB REQUISITION (OUTPATIENT)
Dept: LAB | Facility: HOSPITAL | Age: 49
End: 2019-10-30

## 2019-10-30 DIAGNOSIS — N18.9 CHRONIC KIDNEY DISEASE, UNSPECIFIED: ICD-10-CM

## 2019-10-30 DIAGNOSIS — Z02.83 ENCOUNTER FOR BLOOD-ALCOHOL AND BLOOD-DRUG TEST: ICD-10-CM

## 2019-10-30 DIAGNOSIS — N39.0 URINARY TRACT INFECTION, SITE NOT SPECIFIED: ICD-10-CM

## 2019-10-30 DIAGNOSIS — A41.9 SEPSIS, UNSPECIFIED ORGANISM (HCC): ICD-10-CM

## 2019-10-30 LAB
AMPHET+METHAMPHET UR QL: NEGATIVE
AMPHETAMINES UR QL: NEGATIVE
BARBITURATES UR QL SCN: NEGATIVE
BASOPHILS # BLD AUTO: 0.04 10*3/MM3 (ref 0–0.2)
BASOPHILS NFR BLD AUTO: 0.5 % (ref 0–1.5)
BENZODIAZ UR QL SCN: NEGATIVE
BUPRENORPHINE SERPL-MCNC: NEGATIVE NG/ML
CANNABINOIDS SERPL QL: NEGATIVE
CHOLEST SERPL-MCNC: 204 MG/DL (ref 0–200)
COCAINE UR QL: NEGATIVE
DEPRECATED RDW RBC AUTO: 49.9 FL (ref 37–54)
EOSINOPHIL # BLD AUTO: 0.14 10*3/MM3 (ref 0–0.4)
EOSINOPHIL NFR BLD AUTO: 1.6 % (ref 0.3–6.2)
ERYTHROCYTE [DISTWIDTH] IN BLOOD BY AUTOMATED COUNT: 15.9 % (ref 12.3–15.4)
HCT VFR BLD AUTO: 40.5 % (ref 34–46.6)
HDLC SERPL-MCNC: 40 MG/DL (ref 40–60)
HGB BLD-MCNC: 13.3 G/DL (ref 12–15.9)
IMM GRANULOCYTES # BLD AUTO: 0.34 10*3/MM3 (ref 0–0.05)
IMM GRANULOCYTES NFR BLD AUTO: 3.9 % (ref 0–0.5)
LDLC SERPL CALC-MCNC: 104 MG/DL (ref 0–100)
LDLC/HDLC SERPL: 2.6 {RATIO}
LYMPHOCYTES # BLD AUTO: 2.25 10*3/MM3 (ref 0.7–3.1)
LYMPHOCYTES NFR BLD AUTO: 25.7 % (ref 19.6–45.3)
MCH RBC QN AUTO: 28.7 PG (ref 26.6–33)
MCHC RBC AUTO-ENTMCNC: 32.8 G/DL (ref 31.5–35.7)
MCV RBC AUTO: 87.3 FL (ref 79–97)
METHADONE UR QL SCN: NEGATIVE
MONOCYTES # BLD AUTO: 0.45 10*3/MM3 (ref 0.1–0.9)
MONOCYTES NFR BLD AUTO: 5.1 % (ref 5–12)
NEUTROPHILS # BLD AUTO: 5.52 10*3/MM3 (ref 1.7–7)
NEUTROPHILS NFR BLD AUTO: 63.2 % (ref 42.7–76)
NRBC BLD AUTO-RTO: 0.1 /100 WBC (ref 0–0.2)
OPIATES UR QL: NEGATIVE
OXYCODONE UR QL SCN: NEGATIVE
PCP UR QL SCN: NEGATIVE
PLATELET # BLD AUTO: 355 10*3/MM3 (ref 140–450)
PMV BLD AUTO: 9.8 FL (ref 6–12)
PROPOXYPH UR QL: NEGATIVE
RBC # BLD AUTO: 4.64 10*6/MM3 (ref 3.77–5.28)
TRICYCLICS UR QL SCN: NEGATIVE
TRIGL SERPL-MCNC: 300 MG/DL (ref 0–150)
TSH SERPL DL<=0.05 MIU/L-ACNC: 1.23 UIU/ML (ref 0.27–4.2)
VLDLC SERPL-MCNC: 60 MG/DL (ref 5–40)
WBC NRBC COR # BLD: 8.74 10*3/MM3 (ref 3.4–10.8)

## 2019-10-30 PROCEDURE — 80307 DRUG TEST PRSMV CHEM ANLYZR: CPT | Performed by: PHYSICIAN ASSISTANT

## 2019-10-30 PROCEDURE — 85025 COMPLETE CBC W/AUTO DIFF WBC: CPT | Performed by: PHYSICIAN ASSISTANT

## 2019-10-30 PROCEDURE — 80061 LIPID PANEL: CPT | Performed by: PHYSICIAN ASSISTANT

## 2019-10-30 PROCEDURE — 83036 HEMOGLOBIN GLYCOSYLATED A1C: CPT | Performed by: PHYSICIAN ASSISTANT

## 2019-10-30 PROCEDURE — 84443 ASSAY THYROID STIM HORMONE: CPT | Performed by: PHYSICIAN ASSISTANT

## 2019-10-31 LAB — HBA1C MFR BLD: 5 % (ref 4.8–5.6)

## 2019-11-23 ENCOUNTER — LAB REQUISITION (OUTPATIENT)
Dept: LAB | Facility: HOSPITAL | Age: 49
End: 2019-11-23

## 2019-11-23 DIAGNOSIS — J44.9 CHRONIC OBSTRUCTIVE PULMONARY DISEASE, UNSPECIFIED (HCC): ICD-10-CM

## 2019-11-23 DIAGNOSIS — E11.40 TYPE 2 DIABETES MELLITUS WITH DIABETIC NEUROPATHY, UNSPECIFIED (HCC): ICD-10-CM

## 2019-11-23 DIAGNOSIS — J96.11 CHRONIC RESPIRATORY FAILURE WITH HYPOXIA (HCC): ICD-10-CM

## 2019-11-23 LAB
ALBUMIN SERPL-MCNC: 3.9 G/DL (ref 3.5–5.2)
ALBUMIN/GLOB SERPL: 1.4 G/DL
ALP SERPL-CCNC: 78 U/L (ref 39–117)
ALT SERPL W P-5'-P-CCNC: 7 U/L (ref 1–33)
ANION GAP SERPL CALCULATED.3IONS-SCNC: 13.6 MMOL/L (ref 5–15)
ANISOCYTOSIS BLD QL: ABNORMAL
AST SERPL-CCNC: 7 U/L (ref 1–32)
BILIRUB SERPL-MCNC: <0.2 MG/DL (ref 0.2–1.2)
BUN BLD-MCNC: 13 MG/DL (ref 6–20)
BUN/CREAT SERPL: 17.3 (ref 7–25)
CALCIUM SPEC-SCNC: 9.1 MG/DL (ref 8.6–10.5)
CHLORIDE SERPL-SCNC: 91 MMOL/L (ref 98–107)
CO2 SERPL-SCNC: 31.4 MMOL/L (ref 22–29)
CREAT BLD-MCNC: 0.75 MG/DL (ref 0.57–1)
DEPRECATED RDW RBC AUTO: 52.1 FL (ref 37–54)
EOSINOPHIL # BLD MANUAL: 0.36 10*3/MM3 (ref 0–0.4)
EOSINOPHIL NFR BLD MANUAL: 4.1 % (ref 0.3–6.2)
ERYTHROCYTE [DISTWIDTH] IN BLOOD BY AUTOMATED COUNT: 16.5 % (ref 12.3–15.4)
GFR SERPL CREATININE-BSD FRML MDRD: 81 ML/MIN/1.73
GLOBULIN UR ELPH-MCNC: 2.8 GM/DL
GLUCOSE BLD-MCNC: 125 MG/DL (ref 65–99)
HCT VFR BLD AUTO: 38.2 % (ref 34–46.6)
HGB BLD-MCNC: 12.7 G/DL (ref 12–15.9)
LYMPHOCYTES # BLD MANUAL: 1.73 10*3/MM3 (ref 0.7–3.1)
LYMPHOCYTES NFR BLD MANUAL: 19.4 % (ref 19.6–45.3)
LYMPHOCYTES NFR BLD MANUAL: 7.1 % (ref 5–12)
MCH RBC QN AUTO: 29.3 PG (ref 26.6–33)
MCHC RBC AUTO-ENTMCNC: 33.2 G/DL (ref 31.5–35.7)
MCV RBC AUTO: 88 FL (ref 79–97)
METAMYELOCYTES NFR BLD MANUAL: 4.1 % (ref 0–0)
MONOCYTES # BLD AUTO: 0.63 10*3/MM3 (ref 0.1–0.9)
MYELOCYTES NFR BLD MANUAL: 4.1 % (ref 0–0)
NEUTROPHILS # BLD AUTO: 5.45 10*3/MM3 (ref 1.7–7)
NEUTROPHILS NFR BLD MANUAL: 61.2 % (ref 42.7–76)
PLAT MORPH BLD: NORMAL
PLATELET # BLD AUTO: 216 10*3/MM3 (ref 140–450)
PMV BLD AUTO: 9.9 FL (ref 6–12)
POLYCHROMASIA BLD QL SMEAR: ABNORMAL
POTASSIUM BLD-SCNC: 4.8 MMOL/L (ref 3.5–5.2)
PROT SERPL-MCNC: 6.7 G/DL (ref 6–8.5)
RBC # BLD AUTO: 4.34 10*6/MM3 (ref 3.77–5.28)
SODIUM BLD-SCNC: 136 MMOL/L (ref 136–145)
WBC MORPH BLD: NORMAL
WBC NRBC COR # BLD: 8.9 10*3/MM3 (ref 3.4–10.8)

## 2019-11-23 PROCEDURE — 80053 COMPREHEN METABOLIC PANEL: CPT | Performed by: INTERNAL MEDICINE

## 2019-11-23 PROCEDURE — 85025 COMPLETE CBC W/AUTO DIFF WBC: CPT | Performed by: INTERNAL MEDICINE

## 2019-12-10 ENCOUNTER — LAB REQUISITION (OUTPATIENT)
Dept: LAB | Facility: HOSPITAL | Age: 49
End: 2019-12-10

## 2019-12-10 DIAGNOSIS — E11.40 TYPE 2 DIABETES MELLITUS WITH DIABETIC NEUROPATHY, UNSPECIFIED (HCC): ICD-10-CM

## 2019-12-10 DIAGNOSIS — I10 ESSENTIAL (PRIMARY) HYPERTENSION: ICD-10-CM

## 2019-12-10 DIAGNOSIS — J96.11 CHRONIC RESPIRATORY FAILURE WITH HYPOXIA (HCC): ICD-10-CM

## 2019-12-10 DIAGNOSIS — J44.9 CHRONIC OBSTRUCTIVE PULMONARY DISEASE, UNSPECIFIED (HCC): ICD-10-CM

## 2019-12-10 DIAGNOSIS — F20.9 SCHIZOPHRENIA, UNSPECIFIED (HCC): ICD-10-CM

## 2019-12-10 DIAGNOSIS — F31.9 BIPOLAR DISORDER, UNSPECIFIED (HCC): ICD-10-CM

## 2019-12-10 LAB
ANION GAP SERPL CALCULATED.3IONS-SCNC: 11.1 MMOL/L (ref 5–15)
ANISOCYTOSIS BLD QL: ABNORMAL
BASOPHILS # BLD MANUAL: 0.08 10*3/MM3 (ref 0–0.2)
BASOPHILS NFR BLD AUTO: 1 % (ref 0–1.5)
BUN BLD-MCNC: 25 MG/DL (ref 6–20)
BUN/CREAT SERPL: 31.6 (ref 7–25)
CALCIUM SPEC-SCNC: 9.2 MG/DL (ref 8.6–10.5)
CHLORIDE SERPL-SCNC: 87 MMOL/L (ref 98–107)
CO2 SERPL-SCNC: 31.9 MMOL/L (ref 22–29)
CREAT BLD-MCNC: 0.79 MG/DL (ref 0.57–1)
DEPRECATED RDW RBC AUTO: 53.2 FL (ref 37–54)
EOSINOPHIL # BLD MANUAL: 0.17 10*3/MM3 (ref 0–0.4)
EOSINOPHIL NFR BLD MANUAL: 2 % (ref 0.3–6.2)
ERYTHROCYTE [DISTWIDTH] IN BLOOD BY AUTOMATED COUNT: 17.2 % (ref 12.3–15.4)
GFR SERPL CREATININE-BSD FRML MDRD: 77 ML/MIN/1.73
GLUCOSE BLD-MCNC: 102 MG/DL (ref 65–99)
HCT VFR BLD AUTO: 37.3 % (ref 34–46.6)
HGB BLD-MCNC: 13 G/DL (ref 12–15.9)
LYMPHOCYTES # BLD MANUAL: 2.39 10*3/MM3 (ref 0.7–3.1)
LYMPHOCYTES NFR BLD MANUAL: 28.6 % (ref 19.6–45.3)
LYMPHOCYTES NFR BLD MANUAL: 8.2 % (ref 5–12)
MCH RBC QN AUTO: 30.4 PG (ref 26.6–33)
MCHC RBC AUTO-ENTMCNC: 34.9 G/DL (ref 31.5–35.7)
MCV RBC AUTO: 87.1 FL (ref 79–97)
MICROCYTES BLD QL: ABNORMAL
MONOCYTES # BLD AUTO: 0.68 10*3/MM3 (ref 0.1–0.9)
MYELOCYTES NFR BLD MANUAL: 5.1 % (ref 0–0)
NEUTROPHILS # BLD AUTO: 4.6 10*3/MM3 (ref 1.7–7)
NEUTROPHILS NFR BLD MANUAL: 55.1 % (ref 42.7–76)
PLAT MORPH BLD: NORMAL
PLATELET # BLD AUTO: 228 10*3/MM3 (ref 140–450)
PMV BLD AUTO: 9.3 FL (ref 6–12)
POLYCHROMASIA BLD QL SMEAR: ABNORMAL
POTASSIUM BLD-SCNC: 5.3 MMOL/L (ref 3.5–5.2)
RBC # BLD AUTO: 4.28 10*6/MM3 (ref 3.77–5.28)
SODIUM BLD-SCNC: 130 MMOL/L (ref 136–145)
WBC MORPH BLD: NORMAL
WBC NRBC COR # BLD: 8.35 10*3/MM3 (ref 3.4–10.8)

## 2019-12-10 PROCEDURE — 85025 COMPLETE CBC W/AUTO DIFF WBC: CPT | Performed by: INTERNAL MEDICINE

## 2019-12-10 PROCEDURE — 80048 BASIC METABOLIC PNL TOTAL CA: CPT | Performed by: INTERNAL MEDICINE

## 2019-12-20 ENCOUNTER — HOSPITAL ENCOUNTER (EMERGENCY)
Facility: HOSPITAL | Age: 49
Discharge: HOME | End: 2019-12-20
Admitting: EMERGENCY MEDICINE

## 2019-12-20 VITALS
RESPIRATION RATE: 18 BRPM | BODY MASS INDEX: 45.93 KG/M2 | SYSTOLIC BLOOD PRESSURE: 150 MMHG | HEIGHT: 64 IN | HEART RATE: 88 BPM | WEIGHT: 269 LBS | OXYGEN SATURATION: 90 % | DIASTOLIC BLOOD PRESSURE: 55 MMHG | TEMPERATURE: 97.3 F

## 2019-12-20 DIAGNOSIS — H65.03 BILATERAL ACUTE SEROUS OTITIS MEDIA, RECURRENCE NOT SPECIFIED: Primary | ICD-10-CM

## 2019-12-20 PROCEDURE — 99282 EMERGENCY DEPT VISIT SF MDM: CPT

## 2019-12-20 PROCEDURE — 99282 EMERGENCY DEPT VISIT SF MDM: CPT | Performed by: EMERGENCY MEDICINE

## 2019-12-20 RX ORDER — CEFDINIR 300 MG/1
300 CAPSULE ORAL 2 TIMES DAILY
Qty: 20 CAPSULE | Refills: 0 | Status: SHIPPED | OUTPATIENT
Start: 2019-12-20 | End: 2021-04-26

## 2019-12-20 NOTE — ED PROVIDER NOTES
Subjective     Earache   Location:  Bilateral  Quality:  Aching  Severity:  Mild  Onset quality:  Gradual  Timing:  Constant  Progression:  Worsening  Chronicity:  New  Relieved by:  Nothing  Worsened by:  Nothing  Ineffective treatments:  None tried  Associated symptoms: ear discharge and rhinorrhea    Associated symptoms: no fever, no neck pain, no sore throat and no tinnitus    Associated symptoms comment:  Congestion      Review of Systems   Constitutional: Negative for fever.   HENT: Positive for ear discharge, ear pain and rhinorrhea. Negative for sore throat and tinnitus.    Musculoskeletal: Negative for neck pain.   All other systems reviewed and are negative.      Past Medical History:   Diagnosis Date   • Arthritis    • COPD (chronic obstructive pulmonary disease) (CMS/HCC)    • Diabetes (CMS/HCC)    • Gout    • HTN (hypertension)    • Hyperlipidemia    • Hyperthyroidism    • Neuropathy    • Obesity    • Sleep apnea        Allergies   Allergen Reactions   • Penicillins Hives       Past Surgical History:   Procedure Laterality Date   • MANDIBLE FRACTURE SURGERY         Family History   Problem Relation Age of Onset   • Cervical cancer Maternal Grandmother    • Cancer Maternal Grandmother    • Hypertension Father    • Heart disease Father        Social History     Socioeconomic History   • Marital status:      Spouse name: Not on file   • Number of children: Not on file   • Years of education: Not on file   • Highest education level: Not on file   Tobacco Use   • Smoking status: Current Every Day Smoker     Packs/day: 2.00     Types: Cigarettes   • Smokeless tobacco: Never Used   Substance and Sexual Activity   • Alcohol use: Yes     Comment: occasionally   • Drug use: No   • Sexual activity: Defer           Objective   Physical Exam   Constitutional: She appears well-developed and well-nourished. No distress.   HENT:   Head: Normocephalic and atraumatic.   Nose: Nose normal.   Mouth/Throat:  Oropharynx is clear and moist.   biltm duull/red   Eyes: Pupils are equal, round, and reactive to light. Conjunctivae and EOM are normal. Right eye exhibits no discharge. Left eye exhibits no discharge.   Neck: Normal range of motion. Neck supple.   Lymphadenopathy:     She has no cervical adenopathy.   Skin: Skin is warm. No rash noted. She is not diaphoretic. No erythema.   Nursing note and vitals reviewed.      Procedures           ED Course      Ok with plan to f/u pcp                No data recorded                        MDM  Number of Diagnoses or Management Options  Bilateral acute serous otitis media, recurrence not specified:   Risk of Complications, Morbidity, and/or Mortality  Presenting problems: low  Diagnostic procedures: low  Management options: low    Patient Progress  Patient progress: stable      Final diagnoses:   Bilateral acute serous otitis media, recurrence not specified              Darryn Willingham MD  12/20/19 8077

## 2019-12-29 ENCOUNTER — APPOINTMENT (OUTPATIENT)
Dept: GENERAL RADIOLOGY | Facility: HOSPITAL | Age: 49
End: 2019-12-29

## 2019-12-29 ENCOUNTER — HOSPITAL ENCOUNTER (EMERGENCY)
Facility: HOSPITAL | Age: 49
Discharge: HOME | End: 2019-12-29
Attending: EMERGENCY MEDICINE | Admitting: EMERGENCY MEDICINE

## 2019-12-29 VITALS
DIASTOLIC BLOOD PRESSURE: 78 MMHG | OXYGEN SATURATION: 97 % | SYSTOLIC BLOOD PRESSURE: 148 MMHG | HEIGHT: 63 IN | RESPIRATION RATE: 17 BRPM | HEART RATE: 89 BPM | TEMPERATURE: 98.2 F | BODY MASS INDEX: 47.65 KG/M2

## 2019-12-29 DIAGNOSIS — J44.1 COPD WITH ACUTE EXACERBATION (HCC): Primary | ICD-10-CM

## 2019-12-29 LAB
ALBUMIN SERPL-MCNC: 4.3 G/DL (ref 3.5–5.2)
ALBUMIN/GLOB SERPL: 1.4 G/DL
ALP SERPL-CCNC: 77 U/L (ref 39–117)
ALT SERPL W P-5'-P-CCNC: 8 U/L (ref 1–33)
ANION GAP SERPL CALCULATED.3IONS-SCNC: 13 MMOL/L (ref 5–15)
ARTERIAL PATENCY WRIST A: POSITIVE
AST SERPL-CCNC: 11 U/L (ref 1–32)
ATMOSPHERIC PRESS: 742 MMHG
BASE EXCESS BLDA CALC-SCNC: 1.2 MMOL/L (ref 0–2)
BASOPHILS # BLD AUTO: 0.04 10*3/MM3 (ref 0–0.2)
BASOPHILS NFR BLD AUTO: 0.5 % (ref 0–1.5)
BDY SITE: ABNORMAL
BILIRUB SERPL-MCNC: 0.3 MG/DL (ref 0.2–1.2)
BUN BLD-MCNC: 20 MG/DL (ref 6–20)
BUN/CREAT SERPL: 20.4 (ref 7–25)
CALCIUM SPEC-SCNC: 9.6 MG/DL (ref 8.6–10.5)
CHLORIDE SERPL-SCNC: 94 MMOL/L (ref 98–107)
CO2 SERPL-SCNC: 28 MMOL/L (ref 22–29)
CREAT BLD-MCNC: 0.98 MG/DL (ref 0.57–1)
DEPRECATED RDW RBC AUTO: 52.1 FL (ref 37–54)
EOSINOPHIL # BLD AUTO: 0.18 10*3/MM3 (ref 0–0.4)
EOSINOPHIL NFR BLD AUTO: 2.1 % (ref 0.3–6.2)
ERYTHROCYTE [DISTWIDTH] IN BLOOD BY AUTOMATED COUNT: 16 % (ref 12.3–15.4)
GFR SERPL CREATININE-BSD FRML MDRD: 60 ML/MIN/1.73
GLOBULIN UR ELPH-MCNC: 3.1 GM/DL
GLUCOSE BLD-MCNC: 111 MG/DL (ref 65–99)
HCO3 BLDA-SCNC: 26.5 MMOL/L (ref 22–28)
HCT VFR BLD AUTO: 40.4 % (ref 34–46.6)
HGB BLD-MCNC: 13.5 G/DL (ref 12–15.9)
IMM GRANULOCYTES # BLD AUTO: 0.09 10*3/MM3 (ref 0–0.05)
IMM GRANULOCYTES NFR BLD AUTO: 1 % (ref 0–0.5)
LYMPHOCYTES # BLD AUTO: 1.66 10*3/MM3 (ref 0.7–3.1)
LYMPHOCYTES NFR BLD AUTO: 19 % (ref 19.6–45.3)
MCH RBC QN AUTO: 29.7 PG (ref 26.6–33)
MCHC RBC AUTO-ENTMCNC: 33.4 G/DL (ref 31.5–35.7)
MCV RBC AUTO: 88.8 FL (ref 79–97)
MODALITY: ABNORMAL
MONOCYTES # BLD AUTO: 0.75 10*3/MM3 (ref 0.1–0.9)
MONOCYTES NFR BLD AUTO: 8.6 % (ref 5–12)
NEUTROPHILS # BLD AUTO: 6 10*3/MM3 (ref 1.7–7)
NEUTROPHILS NFR BLD AUTO: 68.8 % (ref 42.7–76)
NRBC BLD AUTO-RTO: 0 /100 WBC (ref 0–0.2)
NT-PROBNP SERPL-MCNC: 33 PG/ML (ref 5–900)
PCO2 BLDA: 43.4 MM HG (ref 35–45)
PH BLDA: 7.39 PH UNITS (ref 7.35–7.45)
PLATELET # BLD AUTO: 253 10*3/MM3 (ref 140–450)
PMV BLD AUTO: 8.6 FL (ref 6–12)
PO2 BLDA: 52.4 MM HG (ref 80–100)
POTASSIUM BLD-SCNC: 4 MMOL/L (ref 3.5–5.2)
PROT SERPL-MCNC: 7.4 G/DL (ref 6–8.5)
RBC # BLD AUTO: 4.55 10*6/MM3 (ref 3.77–5.28)
SAO2 % BLDCOA: 86.3 % (ref 92–99)
SODIUM BLD-SCNC: 135 MMOL/L (ref 136–145)
TOTAL RATE: 20 BREATHS/MINUTE
TROPONIN T SERPL-MCNC: <0.01 NG/ML (ref 0–0.03)
WBC NRBC COR # BLD: 8.72 10*3/MM3 (ref 3.4–10.8)

## 2019-12-29 PROCEDURE — 84484 ASSAY OF TROPONIN QUANT: CPT | Performed by: EMERGENCY MEDICINE

## 2019-12-29 PROCEDURE — 85025 COMPLETE CBC W/AUTO DIFF WBC: CPT | Performed by: EMERGENCY MEDICINE

## 2019-12-29 PROCEDURE — 71046 X-RAY EXAM CHEST 2 VIEWS: CPT

## 2019-12-29 PROCEDURE — 99284 EMERGENCY DEPT VISIT MOD MDM: CPT

## 2019-12-29 PROCEDURE — 93010 ELECTROCARDIOGRAM REPORT: CPT | Performed by: INTERNAL MEDICINE

## 2019-12-29 PROCEDURE — 83880 ASSAY OF NATRIURETIC PEPTIDE: CPT | Performed by: EMERGENCY MEDICINE

## 2019-12-29 PROCEDURE — 36600 WITHDRAWAL OF ARTERIAL BLOOD: CPT

## 2019-12-29 PROCEDURE — 82803 BLOOD GASES ANY COMBINATION: CPT

## 2019-12-29 PROCEDURE — 80053 COMPREHEN METABOLIC PANEL: CPT | Performed by: EMERGENCY MEDICINE

## 2019-12-29 PROCEDURE — 93005 ELECTROCARDIOGRAM TRACING: CPT | Performed by: EMERGENCY MEDICINE

## 2019-12-29 RX ORDER — CLONIDINE HYDROCHLORIDE 0.1 MG/1
0.1 TABLET ORAL EVERY 6 HOURS PRN
COMMUNITY

## 2019-12-29 RX ORDER — OLMESARTAN MEDOXOMIL 40 MG/1
40 TABLET ORAL DAILY
COMMUNITY

## 2021-04-26 ENCOUNTER — HOSPITAL ENCOUNTER (EMERGENCY)
Facility: HOSPITAL | Age: 51
Discharge: HOME | End: 2021-04-26
Attending: EMERGENCY MEDICINE | Admitting: EMERGENCY MEDICINE

## 2021-04-26 VITALS
DIASTOLIC BLOOD PRESSURE: 78 MMHG | OXYGEN SATURATION: 90 % | HEART RATE: 100 BPM | BODY MASS INDEX: 50.02 KG/M2 | TEMPERATURE: 98.1 F | WEIGHT: 293 LBS | RESPIRATION RATE: 18 BRPM | HEIGHT: 64 IN | SYSTOLIC BLOOD PRESSURE: 146 MMHG

## 2021-04-26 DIAGNOSIS — E66.01 MORBID OBESITY WITH BMI OF 60.0-69.9, ADULT (HCC): ICD-10-CM

## 2021-04-26 DIAGNOSIS — L03.311 CELLULITIS, ABDOMINAL WALL: Primary | ICD-10-CM

## 2021-04-26 LAB
ALBUMIN SERPL-MCNC: 3.8 G/DL (ref 3.5–5.2)
ALBUMIN/GLOB SERPL: 1 G/DL
ALP SERPL-CCNC: 72 U/L (ref 39–117)
ALT SERPL W P-5'-P-CCNC: 6 U/L (ref 1–33)
ANION GAP SERPL CALCULATED.3IONS-SCNC: 7.7 MMOL/L (ref 5–15)
AST SERPL-CCNC: 9 U/L (ref 1–32)
BASOPHILS # BLD AUTO: 0.03 10*3/MM3 (ref 0–0.2)
BASOPHILS NFR BLD AUTO: 0.3 % (ref 0–1.5)
BILIRUB SERPL-MCNC: 0.2 MG/DL (ref 0–1.2)
BILIRUB UR QL STRIP: NEGATIVE
BUN SERPL-MCNC: 29 MG/DL (ref 6–20)
BUN/CREAT SERPL: 23.4 (ref 7–25)
CALCIUM SPEC-SCNC: 9.5 MG/DL (ref 8.6–10.5)
CHLORIDE SERPL-SCNC: 93 MMOL/L (ref 98–107)
CLARITY UR: CLEAR
CO2 SERPL-SCNC: 33.3 MMOL/L (ref 22–29)
COLOR UR: YELLOW
CREAT SERPL-MCNC: 1.24 MG/DL (ref 0.57–1)
DEPRECATED RDW RBC AUTO: 62.6 FL (ref 37–54)
EOSINOPHIL # BLD AUTO: 0.13 10*3/MM3 (ref 0–0.4)
EOSINOPHIL NFR BLD AUTO: 1.1 % (ref 0.3–6.2)
ERYTHROCYTE [DISTWIDTH] IN BLOOD BY AUTOMATED COUNT: 19.9 % (ref 12.3–15.4)
GFR SERPL CREATININE-BSD FRML MDRD: 45 ML/MIN/1.73
GLOBULIN UR ELPH-MCNC: 3.7 GM/DL
GLUCOSE SERPL-MCNC: 91 MG/DL (ref 65–99)
GLUCOSE UR STRIP-MCNC: NEGATIVE MG/DL
HCT VFR BLD AUTO: 43.6 % (ref 34–46.6)
HGB BLD-MCNC: 13.3 G/DL (ref 12–15.9)
HGB UR QL STRIP.AUTO: NEGATIVE
IMM GRANULOCYTES # BLD AUTO: 0.8 10*3/MM3 (ref 0–0.05)
IMM GRANULOCYTES NFR BLD AUTO: 6.8 % (ref 0–0.5)
KETONES UR QL STRIP: NEGATIVE
LEUKOCYTE ESTERASE UR QL STRIP.AUTO: NEGATIVE
LYMPHOCYTES # BLD AUTO: 2.28 10*3/MM3 (ref 0.7–3.1)
LYMPHOCYTES NFR BLD AUTO: 19.4 % (ref 19.6–45.3)
MCH RBC QN AUTO: 26.9 PG (ref 26.6–33)
MCHC RBC AUTO-ENTMCNC: 30.5 G/DL (ref 31.5–35.7)
MCV RBC AUTO: 88.3 FL (ref 79–97)
MONOCYTES # BLD AUTO: 1.18 10*3/MM3 (ref 0.1–0.9)
MONOCYTES NFR BLD AUTO: 10.1 % (ref 5–12)
NEUTROPHILS NFR BLD AUTO: 62.3 % (ref 42.7–76)
NEUTROPHILS NFR BLD AUTO: 7.32 10*3/MM3 (ref 1.7–7)
NITRITE UR QL STRIP: NEGATIVE
NRBC BLD AUTO-RTO: 0 /100 WBC (ref 0–0.2)
PH UR STRIP.AUTO: 6 [PH] (ref 4.5–8)
PLAT MORPH BLD: NORMAL
PLATELET # BLD AUTO: 248 10*3/MM3 (ref 140–450)
PMV BLD AUTO: 9 FL (ref 6–12)
POTASSIUM SERPL-SCNC: 4.9 MMOL/L (ref 3.5–5.2)
PROT SERPL-MCNC: 7.5 G/DL (ref 6–8.5)
PROT UR QL STRIP: NEGATIVE
RBC # BLD AUTO: 4.94 10*6/MM3 (ref 3.77–5.28)
RBC MORPH BLD: NORMAL
SODIUM SERPL-SCNC: 134 MMOL/L (ref 136–145)
SP GR UR STRIP: 1.01 (ref 1–1.03)
UROBILINOGEN UR QL STRIP: NORMAL
WBC # BLD AUTO: 11.74 10*3/MM3 (ref 3.4–10.8)
WBC MORPH BLD: NORMAL

## 2021-04-26 PROCEDURE — 99283 EMERGENCY DEPT VISIT LOW MDM: CPT

## 2021-04-26 PROCEDURE — 80053 COMPREHEN METABOLIC PANEL: CPT | Performed by: PHYSICIAN ASSISTANT

## 2021-04-26 PROCEDURE — 85025 COMPLETE CBC W/AUTO DIFF WBC: CPT | Performed by: PHYSICIAN ASSISTANT

## 2021-04-26 PROCEDURE — 96365 THER/PROPH/DIAG IV INF INIT: CPT

## 2021-04-26 PROCEDURE — 99283 EMERGENCY DEPT VISIT LOW MDM: CPT | Performed by: PHYSICIAN ASSISTANT

## 2021-04-26 PROCEDURE — 81003 URINALYSIS AUTO W/O SCOPE: CPT | Performed by: PHYSICIAN ASSISTANT

## 2021-04-26 PROCEDURE — 85007 BL SMEAR W/DIFF WBC COUNT: CPT | Performed by: PHYSICIAN ASSISTANT

## 2021-04-26 RX ORDER — SODIUM CHLORIDE 9 MG/ML
INJECTION, SOLUTION INTRAVENOUS
Status: COMPLETED
Start: 2021-04-26 | End: 2021-04-26

## 2021-04-26 RX ORDER — SODIUM CHLORIDE 0.9 % (FLUSH) 0.9 %
10 SYRINGE (ML) INJECTION AS NEEDED
Status: DISCONTINUED | OUTPATIENT
Start: 2021-04-26 | End: 2021-04-26 | Stop reason: HOSPADM

## 2021-04-26 RX ORDER — CLINDAMYCIN PHOSPHATE 600 MG/50ML
600 INJECTION INTRAVENOUS ONCE
Status: COMPLETED | OUTPATIENT
Start: 2021-04-26 | End: 2021-04-26

## 2021-04-26 RX ORDER — CLINDAMYCIN HYDROCHLORIDE 150 MG/1
450 CAPSULE ORAL EVERY 8 HOURS
Qty: 63 CAPSULE | Refills: 0 | Status: SHIPPED | OUTPATIENT
Start: 2021-04-26 | End: 2021-05-03

## 2021-04-26 RX ADMIN — SODIUM CHLORIDE 500 ML: 9 INJECTION, SOLUTION INTRAVENOUS at 13:10

## 2021-04-26 RX ADMIN — CLINDAMYCIN IN 5 PERCENT DEXTROSE 600 MG: 12 INJECTION, SOLUTION INTRAVENOUS at 13:14

## 2021-04-26 NOTE — ED PROVIDER NOTES
EMERGENCY DEPARTMENT ENCOUNTER      Room Number: 05/05    History is provided by the patient, no translation services needed    HPI:    Chief complaint: Cellulitis    Location: Panniculus    Quality/Severity: Mild tenderness in skin of abdomen    Timing/Duration: 2 weeks    Modifying Factors: Patient states she was treated with Levaquin for 1 week with no improvement.    Associated Symptoms: Positive for erythema and tenderness of skin.  She denies any fever, chills, nausea, vomiting.  She states she does have urinary incontinence but this is chronic for her.    Narrative: Pt is a 51 y.o. female who presents complaining of cellulitis of abdomen for the past 2 weeks.  She has a PMH significant for HTN, HLD, T2DM, COPD, AMINATA, and gout.  She is morbidly obese and has a fairly large panniculus where she develops cellulitis frequently.  She states she saw her primary care doctor a week ago and was placed on Levaquin, although she has Levaquin listed in her allergies and states it causes dermatitis.  She reports some erythema of her panniculus, as well as pain localized to the soft tissues.  She denies any underlying abdominal pain, fever, chills, or malaise.  She states she is actually feeling more depressed than usual and would like to enroll herself into a rehab facility to help her get on a path to better health and address her over eating habits.  She denies any suicidal or homicidal ideation.      PMD: Rob Cunha MD    REVIEW OF SYSTEMS  Review of Systems   Constitutional: Negative for chills and fever.   Respiratory: Negative for cough and shortness of breath.    Cardiovascular: Negative for chest pain and palpitations.   Gastrointestinal: Negative for abdominal pain, nausea and vomiting.   Genitourinary: Negative for difficulty urinating, dysuria, frequency and urgency.   Skin: Positive for color change (Erythema) and rash. Negative for wound.   Neurological: Negative for dizziness, syncope and headaches.    Psychiatric/Behavioral: Negative for confusion and suicidal ideas. The patient is nervous/anxious (And depression).          PAST MEDICAL HISTORY  Active Ambulatory Problems     Diagnosis Date Noted   • Primary osteoarthritis of both knees 12/02/2017   • Cellulitis of abdominal wall 12/07/2017   • Abdominal wall cellulitis 06/13/2018   • Greater trochanteric bursitis of right hip 07/22/2019   • Frequent falls 09/16/2019   • Accidental drug overdose 10/03/2019     Resolved Ambulatory Problems     Diagnosis Date Noted   • No Resolved Ambulatory Problems     Past Medical History:   Diagnosis Date   • Arthritis    • COPD (chronic obstructive pulmonary disease) (CMS/[facility])    • Diabetes (CMS/[facility])    • Gout    • HTN (hypertension)    • Hyperlipidemia    • Hyperthyroidism    • Neuropathy    • Obesity    • Sleep apnea        PAST SURGICAL HISTORY  Past Surgical History:   Procedure Laterality Date   • MANDIBLE FRACTURE SURGERY         FAMILY HISTORY  Family History   Problem Relation Age of Onset   • Cervical cancer Maternal Grandmother    • Cancer Maternal Grandmother    • Hypertension Father    • Heart disease Father        SOCIAL HISTORY  Social History     Socioeconomic History   • Marital status:      Spouse name: Not on file   • Number of children: Not on file   • Years of education: Not on file   • Highest education level: Not on file   Tobacco Use   • Smoking status: Current Every Day Smoker     Packs/day: 2.00     Types: Cigarettes   • Smokeless tobacco: Never Used   Substance and Sexual Activity   • Alcohol use: Yes     Comment: occasionally   • Drug use: No   • Sexual activity: Defer       ALLERGIES  Levaquin [levofloxacin] and Penicillins      Current Facility-Administered Medications:   •  [COMPLETED] Insert peripheral IV, , , Once **AND** sodium chloride 0.9 % flush 10 mL, 10 mL, Intravenous, PRN, Elen Cobos PA-C    Current Outpatient Medications:   •  allopurinol (ZYLOPRIM) 300 MG tablet,  Take 300 mg by mouth Daily., Disp: , Rfl:   •  budesonide (PULMICORT) 0.5 MG/2ML nebulizer solution, Take 2 mL by nebulization 2 (Two) Times a Day., Disp: 60 each, Rfl: 2  •  clindamycin (CLEOCIN) 150 MG capsule, Take 3 capsules by mouth Every 8 (Eight) Hours for 7 days., Disp: 63 capsule, Rfl: 0  •  cloNIDine (CATAPRES) 0.1 MG tablet, Take 0.1 mg by mouth Every 6 (Six) Hours As Needed for High Blood Pressure (for BP >140/90)., Disp: , Rfl:   •  diclofenac (VOLTAREN) 1 % gel gel, Apply 4 g topically to the appropriate area as directed Every 6 (Six) Hours., Disp: 300 tube, Rfl: 2  •  divalproex (DEPAKOTE) 500 MG 24 hr tablet, 500 mg 1 (One) Time. Patient takes 1 tablet in am and 2 tablet at bedtime, Disp: , Rfl:   •  furosemide (LASIX) 40 MG tablet, Take 1 tablet by mouth 2 (Two) Times a Day., Disp: 100 tablet, Rfl: 0  •  ipratropium-albuterol (DUO-NEB) 0.5-2.5 mg/3 ml nebulizer, Take 3 mL by nebulization Every 4 (Four) Hours As Needed for Shortness of Air., Disp: 360 mL, Rfl: 0  •  levothyroxine (SYNTHROID, LEVOTHROID) 25 MCG tablet, Take 25 mcg by mouth Daily., Disp: , Rfl:   •  meloxicam (MOBIC) 15 MG tablet, Take 15 mg by mouth Daily., Disp: , Rfl:   •  nystatin (MYCOSTATIN) 030291 UNIT/GM powder, Apply  topically to the appropriate area as directed 2 (Two) Times a Day., Disp: 30 g, Rfl: 0  •  olmesartan (BENICAR) 40 MG tablet, Take 40 mg by mouth Daily., Disp: , Rfl:   •  Omega-3 Fatty Acids (OMEGA 3 PO), Take  by mouth Daily., Disp: , Rfl:   •  omeprazole (priLOSEC) 40 MG capsule, Take 40 mg by mouth Daily., Disp: , Rfl:   •  potassium chloride (KLOR-CON) 20 MEQ packet, Take 20 mEq by mouth Daily., Disp: , Rfl:   •  pravastatin (PRAVACHOL) 40 MG tablet, Take 40 mg by mouth Daily., Disp: , Rfl:   •  risperiDONE (risperDAL) 2 MG tablet, Take 2 mg by mouth 2 (Two) Times a Day., Disp: , Rfl:   •  spironolactone (ALDACTONE) 25 MG tablet, Take 25 mg by mouth Daily., Disp: , Rfl:   •  tiZANidine (ZANAFLEX) 4 MG  tablet, Take 4 mg by mouth At Night As Needed for Muscle Spasms., Disp: , Rfl:     PHYSICAL EXAM  ED Triage Vitals [04/26/21 1042]   Temp Heart Rate Resp BP SpO2   98.1 °F (36.7 °C) 100 18 146/78 90 %      Temp src Heart Rate Source Patient Position BP Location FiO2 (%)   Oral -- Sitting Right arm --       Physical Exam  Vitals and nursing note reviewed.   Constitutional:       General: She is not in acute distress.     Appearance: She is morbidly obese. She is not toxic-appearing.   HENT:      Head: Normocephalic and atraumatic.   Eyes:      Conjunctiva/sclera: Conjunctivae normal.      Pupils: Pupils are equal, round, and reactive to light.   Cardiovascular:      Rate and Rhythm: Normal rate and regular rhythm.   Pulmonary:      Effort: Pulmonary effort is normal.      Breath sounds: Decreased breath sounds present.   Abdominal:      Tenderness: There is no abdominal tenderness. There is no guarding.   Musculoskeletal:         General: Normal range of motion.      Cervical back: Normal range of motion and neck supple.   Skin:     General: Skin is warm and dry.      Capillary Refill: Capillary refill takes less than 2 seconds.      Findings: Erythema (Cellulitis affecting the lower portion of patient's panniculus.  No wounds or drainage present.  She also has what appears to be chronic venous stasis in bilateral lower legs) present.   Neurological:      Mental Status: She is alert and oriented to person, place, and time.   Psychiatric:         Mood and Affect: Mood and affect normal.         Cognition and Memory: Memory normal.         Judgment: Judgment normal.           LAB RESULTS  Lab Results (last 24 hours)     Procedure Component Value Units Date/Time    CBC & Differential [[id number]]  (Abnormal) Collected: 04/26/21 1143    Specimen: Blood Updated: 04/26/21 1214    Narrative:      The following orders were created for panel order CBC & Differential.  Procedure                               Abnormality          Status                     ---------                               -----------         ------                     Scan Slide[[id number]]                   Normal              Final result               CBC Auto Differential[[id number]]        Abnormal            Final result                 Please view results for these tests on the individual orders.    Comprehensive Metabolic Panel [[id number]]  (Abnormal) Collected: 04/26/21 1143    Specimen: Blood Updated: 04/26/21 1205     Glucose 91 mg/dL      BUN 29 mg/dL      Creatinine 1.24 mg/dL      Sodium 134 mmol/L      Potassium 4.9 mmol/L      Comment: Slight hemolysis detected by analyzer. Results may be affected.        Chloride 93 mmol/L      CO2 33.3 mmol/L      Calcium 9.5 mg/dL      Total Protein 7.5 g/dL      Albumin 3.80 g/dL      ALT (SGPT) 6 U/L      AST (SGOT) 9 U/L      Alkaline Phosphatase 72 U/L      Total Bilirubin 0.2 mg/dL      eGFR Non African Amer 45 mL/min/1.73      Globulin 3.7 gm/dL      A/G Ratio 1.0 g/dL      BUN/Creatinine Ratio 23.4     Anion Gap 7.7 mmol/L     Narrative:      GFR Normal >60  Chronic Kidney Disease <60  Kidney Failure <15      Urinalysis With Microscopic If Indicated (No Culture) - Urine, Clean Catch [[id number]]  (Normal) Collected: 04/26/21 1143    Specimen: Urine, Clean Catch Updated: 04/26/21 1149     Color, UA Yellow     Appearance, UA Clear     pH, UA 6.0     Specific Gravity, UA 1.015     Glucose, UA Negative     Ketones, UA Negative     Bilirubin, UA Negative     Blood, UA Negative     Protein, UA Negative     Leuk Esterase, UA Negative     Nitrite, UA Negative     Urobilinogen, UA 0.2 E.U./dL    Narrative:      Urine microscopic not indicated.    CBC Auto Differential [[id number]]  (Abnormal) Collected: 04/26/21 1143    Specimen: Blood Updated: 04/26/21 1153     WBC 11.74 10*3/mm3      RBC 4.94 10*6/mm3      Hemoglobin 13.3 g/dL      Hematocrit 43.6 %      MCV 88.3 fL      MCH 26.9 pg      MCHC 30.5 g/dL      RDW 19.9 %       RDW-SD 62.6 fl      MPV 9.0 fL      Platelets 248 10*3/mm3      Neutrophil % 62.3 %      Lymphocyte % 19.4 %      Monocyte % 10.1 %      Eosinophil % 1.1 %      Basophil % 0.3 %      Immature Grans % 6.8 %      Neutrophils, Absolute 7.32 10*3/mm3      Lymphocytes, Absolute 2.28 10*3/mm3      Monocytes, Absolute 1.18 10*3/mm3      Eosinophils, Absolute 0.13 10*3/mm3      Basophils, Absolute 0.03 10*3/mm3      Immature Grans, Absolute 0.80 10*3/mm3      nRBC 0.0 /100 WBC     Scan Slide [[id number]]  (Normal) Collected: 04/26/21 1143    Specimen: Blood Updated: 04/26/21 1214     RBC Morphology Normal     WBC Morphology Normal     Platelet Morphology Normal            I ordered the above labs and reviewed the results    RADIOLOGY  No Radiology Exams Resulted Within Past 24 Hours    I ordered the above radiologic testing and reviewed the results    PROCEDURES  Procedures      PROGRESS AND CONSULTS  ED Course as of Apr 26 1352   Mon Apr 26, 2021   1125 I discussed lab findings with patient.  She was treated with Levaquin for her cellulitis without good response, and she states that clindamycin has worked better for her in the past.  Her white count is mildly elevated at 11,000 however she is not experiencing any systemic symptoms.  Her SPO2 is on the lower end, however she states she is supposed to wear continuous oxygen but is noncompliant.  Creatinine is slightly elevated here, so we will give her a small bolus of normal saline.we will give her a dose of IV clindamycin here and prescribe clindamycin for home.  I discussed with her that if she does not improve with outpatient treatment after this round of antibiotics she must see her PCP for reevaluation or return to the emergency department.  Recommended return to the ED with any new or worsening symptoms.  Patient is also inquiring about rehab for eating disorder, as well as adjustments of her psych medications.  I discussed that her psychiatrist should be the one  to adjust her psych medications, and that it would be inappropriate for me to dose these here.  She is also asking about diet pills which I feel would be better for her to discuss with her primary care physician as well.  She has also been wearing crocs style shoes without socks, and had some peeling of the skin in her heels without any open wounds.  I recommended that she keep her feet clean, inspect them daily, wear socks, and more supportive shoes.  Patient verbalizes understanding and is agreeable with plan as outlined.    [KS]      ED Course User Index  [KS] Elen Cobos PA-C           MEDICAL DECISION MAKING    MDM       DIAGNOSIS  Final diagnoses:   Cellulitis, abdominal wall   Morbid obesity with BMI of 60.0-69.9, adult (CMS/[facility])       Latest Documented Vital Signs:  As of 13:52 EDT  BP- 146/78 HR- 100 Temp- 98.1 °F (36.7 °C) (Oral) O2 sat- 90%    DISPOSITION  Patient discharged home in care of .    Smoking cessation discussed with patient.    Discussed pertinent findings with the patient/family.  Patient/Family voiced understanding of need to follow-up for recheck and further testing as needed.  Return to the Emergency Department warnings were given.         Medication List      New Prescriptions    clindamycin 150 MG capsule  Commonly known as: CLEOCIN  Take 3 capsules by mouth Every 8 (Eight) Hours for 7 days.           Where to Get Your Medications      These medications were sent to [place], [place] - [address] [facility] [phone]  - [phone]   [address] [facility] [address]    Phone: [phone]   · clindamycin 150 MG capsule             Follow-up Information     Rob Cunha MD. Call in 1 day.    Specialty: Family Medicine  Why: To schedule follow-up appointment  Contact information:  [address] [facility] [address] [phone]                     Dictated utilizing Dragon dictation     Elen Cobos PA-C  04/26/21 0503

## 2023-01-20 ENCOUNTER — TRANSCRIBE ORDERS (OUTPATIENT)
Dept: ADMINISTRATIVE | Facility: HOSPITAL | Age: 53
End: 2023-01-20
Payer: MEDICARE

## 2023-01-20 DIAGNOSIS — Z12.31 SCREENING MAMMOGRAM FOR BREAST CANCER: Primary | ICD-10-CM

## 2023-06-05 ENCOUNTER — HOSPITAL ENCOUNTER (EMERGENCY)
Facility: HOSPITAL | Age: 53
Discharge: HOME | End: 2023-06-05
Attending: EMERGENCY MEDICINE | Admitting: EMERGENCY MEDICINE
Payer: MEDICARE

## 2023-06-05 VITALS
TEMPERATURE: 98 F | HEIGHT: 66 IN | DIASTOLIC BLOOD PRESSURE: 58 MMHG | SYSTOLIC BLOOD PRESSURE: 140 MMHG | WEIGHT: 293 LBS | OXYGEN SATURATION: 91 % | BODY MASS INDEX: 47.09 KG/M2 | RESPIRATION RATE: 18 BRPM | HEART RATE: 79 BPM

## 2023-06-05 DIAGNOSIS — M79.605 PAIN IN BOTH LOWER EXTREMITIES: ICD-10-CM

## 2023-06-05 DIAGNOSIS — L03.115 BILATERAL LOWER LEG CELLULITIS: Primary | ICD-10-CM

## 2023-06-05 DIAGNOSIS — L03.116 BILATERAL LOWER LEG CELLULITIS: Primary | ICD-10-CM

## 2023-06-05 DIAGNOSIS — M79.604 PAIN IN BOTH LOWER EXTREMITIES: ICD-10-CM

## 2023-06-05 PROCEDURE — 99283 EMERGENCY DEPT VISIT LOW MDM: CPT

## 2023-06-05 RX ORDER — HYDROCODONE BITARTRATE AND ACETAMINOPHEN 5; 325 MG/1; MG/1
1 TABLET ORAL ONCE
Status: COMPLETED | OUTPATIENT
Start: 2023-06-05 | End: 2023-06-05

## 2023-06-05 RX ORDER — SODIUM CHLORIDE 0.9 % (FLUSH) 0.9 %
10 SYRINGE (ML) INJECTION AS NEEDED
Status: DISCONTINUED | OUTPATIENT
Start: 2023-06-05 | End: 2023-06-05

## 2023-06-05 RX ORDER — SULFAMETHOXAZOLE AND TRIMETHOPRIM 800; 160 MG/1; MG/1
1 TABLET ORAL EVERY 12 HOURS
Qty: 14 TABLET | Refills: 0 | Status: SHIPPED | OUTPATIENT
Start: 2023-06-05 | End: 2023-06-12

## 2023-06-05 RX ADMIN — HYDROCODONE BITARTRATE AND ACETAMINOPHEN 1 TABLET: 5; 325 TABLET ORAL at 10:53

## 2023-06-05 NOTE — ED NOTES
"MD places orders, RN in to room to obtain labs, pt states \"I do not need all of that, I just want pain meds.  MD aware, in to see pt.  "

## 2023-06-06 NOTE — ED PROVIDER NOTES
"Subjective   History of Present Illness       states she is here for leg swelling and redness, does c/o soa upon exertion, states \"i just need some pain pills\" states the one I am taking don't work    Review of Systems   All other systems reviewed and are negative.    Past Medical History:   Diagnosis Date    Arthritis     COPD (chronic obstructive pulmonary disease)     Diabetes     Gout     HTN (hypertension)     Hyperlipidemia     Hyperthyroidism     Neuropathy     Obesity     Sleep apnea        Allergies   Allergen Reactions    Levofloxacin Dermatitis    Penicillins Hives       Past Surgical History:   Procedure Laterality Date    MANDIBLE FRACTURE SURGERY         Family History   Problem Relation Age of Onset    Cervical cancer Maternal Grandmother     Cancer Maternal Grandmother     Hypertension Father     Heart disease Father        Social History     Socioeconomic History    Marital status:    Tobacco Use    Smoking status: Every Day     Packs/day: 2.00     Types: Cigarettes    Smokeless tobacco: Never   Substance and Sexual Activity    Alcohol use: Yes     Comment: occasionally    Drug use: No    Sexual activity: Defer           Objective   Physical Exam  Vitals and nursing note reviewed.   HENT:      Head: Normocephalic.      Mouth/Throat:      Mouth: Mucous membranes are moist.      Pharynx: Oropharynx is clear.   Eyes:      Conjunctiva/sclera: Conjunctivae normal.   Cardiovascular:      Rate and Rhythm: Normal rate and regular rhythm.      Heart sounds: Normal heart sounds.   Pulmonary:      Effort: Pulmonary effort is normal.      Breath sounds: Normal breath sounds.   Abdominal:      Palpations: Abdomen is soft.   Musculoskeletal:         General: Normal range of motion.      Cervical back: Neck supple.   Skin:     General: Skin is dry.      Capillary Refill: Capillary refill takes 2 to 3 seconds.      Comments: Bilateral legs have mild erythema at the bases.  +1 edema bilaterally.  +2 DP and PT " arterial pulses bilateral ankle and feet.  Sensory is grossly intact in the entire bilateral lower extremities.  Full range of motion at the feet, ankles, knees, and hips.   Neurological:      Mental Status: She is alert and oriented to person, place, and time.   Psychiatric:         Mood and Affect: Mood normal.         Behavior: Behavior normal.       Procedures           ED Course                                           Medical Decision Making  1035 Pt seen again prior to d/c.  Labs/Imaging reviewed and are unremarkable.  Symptoms improved and pt feels better; vitals stable and pt. in NAD. Non-toxic. Comfortable. Ambulating without difficulty.  Tolerating po.  Relaxed breathing.  All questions personally answered at the bedside and all d/c instructions personally reviewed with pt.  Discussed the importance of close outpt. f/u and pt. understands this and agrees to do so.  Pt agrees to return to ED immediately for any new, persistent, or worsening symptoms.    EMR Dragon/Transcription disclaimer:  Much of this encounter note is an electronic transcription/translation of spoken language to printed text, aka voice recognition.  The electronic translation of spoken language may permit erroneous or at times nonsensical words or phrases to be inadvertently transcribed; although I have reviewed the note for such errors, some may still exist so please interpret based on surrounding text content.      Problems Addressed:  Bilateral lower leg cellulitis: complicated acute illness or injury  Pain in both lower extremities: complicated acute illness or injury    Risk  Prescription drug management.        Final diagnoses:   Bilateral lower leg cellulitis   Pain in both lower extremities       ED Disposition  ED Disposition       ED Disposition   Discharge    Condition   Stable    Comment   --               Golden Marroquin, APRN  [address]  [place] [address]  [phone]    In 2 days            Medication List        New Prescriptions      sulfamethoxazole-trimethoprim 800-160 MG per tablet  Commonly known as: BACTRIM DS,SEPTRA DS  Take 1 tablet by mouth Every 12 (Twelve) Hours for 7 days.               Where to Get Your Medications        These medications were sent to [facility]/pharmacy #[id number] - [place] - [address] [facility] - [phone]  - [phone]   [address] [facility] [place] [address]      Phone: [phone]   sulfamethoxazole-trimethoprim 800-160 MG per tablet            Adam Silva MD  06/06/23 7559

## 2023-09-26 ENCOUNTER — HOSPITAL ENCOUNTER (EMERGENCY)
Facility: HOSPITAL | Age: 53
Discharge: HOME | End: 2023-09-26
Attending: EMERGENCY MEDICINE | Admitting: EMERGENCY MEDICINE
Payer: MEDICARE

## 2023-09-26 VITALS
TEMPERATURE: 98 F | SYSTOLIC BLOOD PRESSURE: 178 MMHG | OXYGEN SATURATION: 93 % | RESPIRATION RATE: 20 BRPM | BODY MASS INDEX: 50.02 KG/M2 | WEIGHT: 293 LBS | HEART RATE: 88 BPM | HEIGHT: 64 IN | DIASTOLIC BLOOD PRESSURE: 88 MMHG

## 2023-09-26 DIAGNOSIS — S81.802A OPEN WOUND OF LEFT LOWER EXTREMITY, INITIAL ENCOUNTER: Primary | ICD-10-CM

## 2023-09-26 PROCEDURE — 99283 EMERGENCY DEPT VISIT LOW MDM: CPT

## 2023-09-26 RX ORDER — DOXYCYCLINE 100 MG/1
100 CAPSULE ORAL ONCE
Status: COMPLETED | OUTPATIENT
Start: 2023-09-26 | End: 2023-09-26

## 2023-09-26 RX ORDER — CEPHALEXIN 500 MG/1
500 CAPSULE ORAL ONCE
Status: COMPLETED | OUTPATIENT
Start: 2023-09-26 | End: 2023-09-26

## 2023-09-26 RX ORDER — CEPHALEXIN 500 MG/1
500 CAPSULE ORAL 4 TIMES DAILY
Qty: 56 CAPSULE | Refills: 0 | Status: SHIPPED | OUTPATIENT
Start: 2023-09-26 | End: 2023-10-10

## 2023-09-26 RX ORDER — DOXYCYCLINE 100 MG/1
100 CAPSULE ORAL 2 TIMES DAILY
Qty: 28 CAPSULE | Refills: 0 | Status: SHIPPED | OUTPATIENT
Start: 2023-09-26 | End: 2023-10-10

## 2023-09-26 RX ADMIN — CEPHALEXIN 500 MG: 500 CAPSULE ORAL at 14:35

## 2023-09-26 RX ADMIN — DOXYCYCLINE 100 MG: 100 CAPSULE ORAL at 14:35

## 2023-09-26 NOTE — DISCHARGE INSTRUCTIONS
Medications as directed.  Call the wound clinic when you leave the ED today to schedule a follow-up appointment.  Follow-up with your PCP as well.  Return to the ED for worsening symptoms or medical emergencies.

## 2023-09-26 NOTE — ED PROVIDER NOTES
EMERGENCY DEPARTMENT ENCOUNTER      Room Number: 14/14    History is provided by the patient, no translation services needed    HPI:    Chief complaint: Wound check    Location: Anterior portion of the left lower extremity    Quality/Severity: Patient denies any pain    Timing/Duration: Intermittent x1 year    Modifying Factors: None    Associated Symptoms: None    Narrative: Pt is a 53 y.o. female who presents complaining of a wound to the anterior portion of her left lower extremity x1 year.  She states that the wound occasionally improves and then sometimes returns after healing.  She states that she is typically placed on antibiotics for the wound.  Patient reports having a scab over the wound that she picked off today, causing the wound to open back up.  She denies any pain.  She denies any fevers or chills.  She denies any other complications.  Patient states that she has not followed up with wound care regarding the wound.      PMD: Golden Marroquin APRN    REVIEW OF SYSTEMS  Review of Systems   Constitutional:  Negative for chills and fever.   Eyes:  Negative for photophobia and visual disturbance.   Respiratory:  Negative for cough and shortness of breath.    Cardiovascular:  Negative for chest pain.   Gastrointestinal:  Negative for abdominal pain, constipation, diarrhea, nausea and vomiting.   Genitourinary:  Negative for dysuria.   Musculoskeletal:  Negative for back pain and neck pain.   Skin:  Positive for wound (0.25 x 1 cm, anterior portion of the LLE). Negative for color change, pallor and rash.   Neurological:  Negative for dizziness, syncope, weakness, numbness and headaches.   Psychiatric/Behavioral:  Negative for confusion. The patient is not nervous/anxious.        PAST MEDICAL HISTORY  Active Ambulatory Problems     Diagnosis Date Noted    Primary osteoarthritis of both knees 12/02/2017    Cellulitis of abdominal wall 12/07/2017    Abdominal wall cellulitis 06/13/2018    Greater trochanteric  bursitis of right hip 07/22/2019    Frequent falls 09/16/2019    Accidental drug overdose 10/03/2019     Resolved Ambulatory Problems     Diagnosis Date Noted    No Resolved Ambulatory Problems     Past Medical History:   Diagnosis Date    Arthritis     COPD (chronic obstructive pulmonary disease)     Diabetes     Gout     HTN (hypertension)     Hyperlipidemia     Hyperthyroidism     Neuropathy     Obesity     Sleep apnea        PAST SURGICAL HISTORY  Past Surgical History:   Procedure Laterality Date    MANDIBLE FRACTURE SURGERY         FAMILY HISTORY  Family History   Problem Relation Age of Onset    Cervical cancer Maternal Grandmother     Cancer Maternal Grandmother     Hypertension Father     Heart disease Father        SOCIAL HISTORY  Social History     Socioeconomic History    Marital status:    Tobacco Use    Smoking status: Every Day     Packs/day: 2.00     Types: Cigarettes    Smokeless tobacco: Never   Substance and Sexual Activity    Alcohol use: Yes     Comment: occasionally    Drug use: No    Sexual activity: Defer       ALLERGIES  Levofloxacin and Penicillins      Current Facility-Administered Medications:     cephalexin (KEFLEX) capsule 500 mg, 500 mg, Oral, Once, Nedra Hytat PA-C    doxycycline (MONODOX) capsule 100 mg, 100 mg, Oral, Once, Nedra Hyatt PA-C    Current Outpatient Medications:     allopurinol (ZYLOPRIM) 300 MG tablet, Take 300 mg by mouth Daily., Disp: , Rfl:     atorvastatin (LIPITOR) 20 MG tablet, Take 1 tablet by mouth Daily., Disp: 30 tablet, Rfl: 11    budesonide (PULMICORT) 0.5 MG/2ML nebulizer solution, Take 2 mL by nebulization 2 (Two) Times a Day., Disp: 60 each, Rfl: 2    cephalexin (KEFLEX) 500 MG capsule, Take 1 capsule by mouth 4 (Four) Times a Day for 14 days., Disp: 56 capsule, Rfl: 0    citalopram (CeleXA) 10 MG tablet, 1 tablet., Disp: , Rfl:     cloNIDine (CATAPRES) 0.1 MG tablet, Take 0.1 mg by mouth Every 6 (Six) Hours As Needed for High Blood  Pressure (for BP >140/90)., Disp: , Rfl:     diclofenac (VOLTAREN) 1 % gel gel, Apply 4 g topically to the appropriate area as directed Every 6 (Six) Hours., Disp: 300 tube, Rfl: 2    Diclofenac Sodium (VOLTAREN) 1 % gel gel, Apply 4 g topically to the appropriate area as directed., Disp: , Rfl:     divalproex (DEPAKOTE) 500 MG 24 hr tablet, 500 mg 1 (One) Time. Patient takes 1 tablet in am and 2 tablet at bedtime, Disp: , Rfl:     divalproex (DEPAKOTE) 500 MG DR tablet, Take  by mouth., Disp: , Rfl:     doxycycline (MONODOX) 100 MG capsule, Take 1 capsule by mouth 2 (Two) Times a Day for 14 days., Disp: 28 capsule, Rfl: 0    furosemide (LASIX) 40 MG tablet, Take 1 tablet by mouth 2 (Two) Times a Day., Disp: 100 tablet, Rfl: 0    furosemide (LASIX) 40 MG tablet, Take 1 tablet by mouth 2 (Two) Times a Day., Disp: , Rfl:     ipratropium-albuterol (DUO-NEB) 0.5-2.5 mg/3 ml nebulizer, Take 3 mL by nebulization Every 4 (Four) Hours As Needed for Shortness of Air., Disp: 360 mL, Rfl: 0    levothyroxine (SYNTHROID, LEVOTHROID) 25 MCG tablet, Take 25 mcg by mouth Daily., Disp: , Rfl:     levothyroxine (SYNTHROID, LEVOTHROID) 25 MCG tablet, Take 1 tablet by mouth Daily., Disp: , Rfl:     lisinopril (PRINIVIL,ZESTRIL) 30 MG tablet, 1 tablet., Disp: , Rfl:     meloxicam (MOBIC) 15 MG tablet, Take 15 mg by mouth Daily., Disp: , Rfl:     meloxicam (MOBIC) 7.5 MG tablet, Take  by mouth Daily., Disp: , Rfl:     nystatin (MYCOSTATIN) 161419 UNIT/GM powder, Apply  topically to the appropriate area as directed 2 (Two) Times a Day., Disp: 30 g, Rfl: 0    OLANZapine zydis (zyPREXA) 20 MG disintegrating tablet, See Instructions, Prior to radiation treatments- take 1 tablet as directed, 0 Refill(s), Disp: , Rfl:     olmesartan (BENICAR) 40 MG tablet, Take 40 mg by mouth Daily., Disp: , Rfl:     Omega-3 Fatty Acids (fish oil) 1000 MG capsule capsule, Take 2 capsules by mouth 2 (Two) Times a Day., Disp: , Rfl:     Omega-3 Fatty Acids  (OMEGA 3 PO), Take  by mouth Daily., Disp: , Rfl:     omeprazole (priLOSEC) 40 MG capsule, Take 40 mg by mouth Daily., Disp: , Rfl:     omeprazole (priLOSEC) 40 MG capsule, Take 1 capsule by mouth Daily., Disp: , Rfl:     paliperidone palmitate (INVEGA SUSTENNA) 234 MG/1.5ML suspension prefilled syringe IM injection, Inject 1.5 mL into the appropriate muscle as directed by prescriber., Disp: , Rfl:     potassium chloride (KLOR-CON) 20 MEQ packet, Take 20 mEq by mouth Daily., Disp: , Rfl:     pravastatin (PRAVACHOL) 40 MG tablet, Take 40 mg by mouth Daily., Disp: , Rfl:     risperiDONE (risperDAL) 2 MG tablet, Take 2 mg by mouth 2 (Two) Times a Day., Disp: , Rfl:     spironolactone (ALDACTONE) 25 MG tablet, Take 25 mg by mouth Daily., Disp: , Rfl:     tiZANidine (ZANAFLEX) 4 MG tablet, Take 4 mg by mouth At Night As Needed for Muscle Spasms., Disp: , Rfl:     topiramate (TOPAMAX) 25 MG tablet, Take 1 tablet by mouth., Disp: , Rfl:     PHYSICAL EXAM  ED Triage Vitals [09/26/23 1317]   Temp Heart Rate Resp BP SpO2   98 °F (36.7 °C) 88 20 178/88 93 %      Temp src Heart Rate Source Patient Position BP Location FiO2 (%)   Oral Monitor -- -- --       Physical Exam  Vitals and nursing note reviewed.   Constitutional:       General: She is not in acute distress.     Appearance: Normal appearance. She is obese. She is not ill-appearing, toxic-appearing or diaphoretic.   HENT:      Head: Normocephalic and atraumatic.      Nose: Nose normal. No congestion or rhinorrhea.      Mouth/Throat:      Mouth: Mucous membranes are moist.      Pharynx: Oropharynx is clear.   Eyes:      General: No scleral icterus.        Right eye: No discharge.         Left eye: No discharge.      Extraocular Movements: Extraocular movements intact.      Conjunctiva/sclera: Conjunctivae normal.      Pupils: Pupils are equal, round, and reactive to light.   Cardiovascular:      Rate and Rhythm: Normal rate and regular rhythm.      Pulses: Normal pulses.       Heart sounds: Normal heart sounds.     No friction rub.   Pulmonary:      Effort: Pulmonary effort is normal. No respiratory distress.      Breath sounds: Normal breath sounds. No stridor. No wheezing, rhonchi or rales.   Chest:      Chest wall: No tenderness.   Abdominal:      General: Bowel sounds are normal. There is no distension.      Palpations: Abdomen is soft. There is no mass.      Tenderness: There is no abdominal tenderness. There is no guarding or rebound.   Musculoskeletal:         General: No swelling, tenderness, deformity or signs of injury. Normal range of motion.      Cervical back: Normal range of motion and neck supple. No rigidity.      Right lower leg: Edema present.      Left lower leg: Edema present.   Skin:     General: Skin is warm and dry.      Coloration: Skin is not jaundiced or pale.      Findings: Lesion (0.25 cm x 1 cm wound, anterior portion of the LLE) present. No bruising, erythema or rash.   Neurological:      Mental Status: She is alert and oriented to person, place, and time.      Motor: No weakness.      Coordination: Coordination normal.   Psychiatric:         Mood and Affect: Mood and affect normal.         LAB RESULTS  Lab Results (last 24 hours)       ** No results found for the last 24 hours. **              RADIOLOGY  No Radiology Exams Resulted Within Past 24 Hours        PROCEDURES  Procedures      PROGRESS AND CONSULTS  ED Course as of 09/26/23 1425   Tue Sep 26, 2023   1343 The patient has a wound on the anterior portion of her left lower extremity.  She states that the wound has been present for approximately 1 year.  The wound heals at times and then returns.  She states that she picked a scab off of the wound today which caused the wound to open back up.  She presents to the ED for a wound check.  She denies any fevers or chills.  She denies any pain in the area.  Her vital signs are stable and within normal limits. [AH]   1422 Wound cleaned thoroughly by the  RN.  Patient states that the area feels much better at this time.  I advised her that I will start her on antibiotics and have her follow-up with the wound clinic as soon as possible.  She expressed understanding.  Follow-up instructions given.  Return to the ED instructions given. []   1423 She states that she is able to take Keflex. []      ED Course User Index  [AH] Nedra Hyatt PA-C           MEDICAL DECISION MAKING    MDM       My diagnosis for lower extremity pain and injury includes but is not limited to hip fracture, femur fracture, hip dislocation, hip contusion, hip sprain, hip strain, pelvic fracture, ischio-tibial band pain, ischio-tibial band bursitis, knee sprain, patella dislocation, knee dislocation, internal derangement of knee, fractures of the femur, tibia, fibula, ankle, foot and digits, ankle sprain, ankle dislocation, Lisfranc fracture, fracture dislocations of the digits, pulmonary embolism, claudication, peripheral vascular disease, gout, osteoarthritis, rheumatoid arthritis, bursitis, septic joint, poly-rheumatica, polyarthralgia and other inflammatory or infectious disease processes.   DIAGNOSIS  Final diagnoses:   Open wound of left lower extremity, initial encounter       Latest Documented Vital Signs:  As of 14:25 EDT  BP- 178/88 HR- 88 Temp- 98 °F (36.7 °C) (Oral) O2 sat- 93%    DISPOSITION  Pt discharged    Discussed pertinent findings with the patient/family.  Patient/Family voiced understanding of need to follow-up for recheck and further testing as needed.  Return to the Emergency Department warnings were given.         Medication List        New Prescriptions      cephalexin 500 MG capsule  Commonly known as: KEFLEX  Take 1 capsule by mouth 4 (Four) Times a Day for 14 days.     doxycycline 100 MG capsule  Commonly known as: MONODOX  Take 1 capsule by mouth 2 (Two) Times a Day for 14 days.               Where to Get Your Medications        These medications were sent to  [id number] - [place], [place] - [address] - [phone]  - [phone] FX  [address] [address]      Phone: [phone]   cephalexin 500 MG capsule  doxycycline 100 MG capsule              Follow-up Information       Golden Marroquin APRN. Call today.    Specialty: Nurse Practitioner  Why: to schedule follow up  Contact information:  [address]    [facility] [id number] [phone]               [facility]. Call today.    Why: to schedule follow up  Contact information:  [address]  [place] [address] [phone]             Go to  [facility] EMERGENCY DEPARTMENT.    Specialty: Emergency Medicine  Why: If symptoms worsen  Contact information:  [address]  [place] [address] [phone]                             Dictated utilizing Dragon dictation     Nedra Hyatt PA-C  09/26/23 1425

## 2023-10-02 ENCOUNTER — TELEPHONE (OUTPATIENT)
Dept: LAB | Facility: OTHER | Age: 53
End: 2023-10-02
Payer: MEDICARE

## 2023-10-02 ENCOUNTER — TELEPHONE (OUTPATIENT)
Dept: LAB | Facility: OTHER | Age: 53
End: 2023-10-02

## 2023-10-02 NOTE — TELEPHONE ENCOUNTER
SN contacted patient back to report EMS is contacting her to discuss.      SN will contact LUIS Gusman listed on chart as PCP.

## 2024-01-24 ENCOUNTER — OFFICE VISIT (OUTPATIENT)
Dept: ORTHOPEDIC SURGERY | Facility: CLINIC | Age: 54
End: 2024-01-24
Payer: MEDICARE

## 2024-01-24 VITALS
BODY MASS INDEX: 50.02 KG/M2 | SYSTOLIC BLOOD PRESSURE: 147 MMHG | WEIGHT: 293 LBS | HEIGHT: 64 IN | HEART RATE: 73 BPM | DIASTOLIC BLOOD PRESSURE: 86 MMHG

## 2024-01-24 DIAGNOSIS — E66.01 OBESITY, MORBID, BMI 50 OR HIGHER: ICD-10-CM

## 2024-01-24 DIAGNOSIS — M25.562 PAIN IN BOTH KNEES, UNSPECIFIED CHRONICITY: ICD-10-CM

## 2024-01-24 DIAGNOSIS — M17.0 PRIMARY OSTEOARTHRITIS OF BOTH KNEES: Primary | ICD-10-CM

## 2024-01-24 DIAGNOSIS — M25.561 PAIN IN BOTH KNEES, UNSPECIFIED CHRONICITY: ICD-10-CM

## 2024-01-24 RX ORDER — TRIAMCINOLONE ACETONIDE 40 MG/ML
80 INJECTION, SUSPENSION INTRA-ARTICULAR; INTRAMUSCULAR
Status: COMPLETED | OUTPATIENT
Start: 2024-01-24 | End: 2024-01-24

## 2024-01-24 RX ORDER — LIDOCAINE HYDROCHLORIDE 10 MG/ML
8 INJECTION, SOLUTION EPIDURAL; INFILTRATION; INTRACAUDAL; PERINEURAL
Status: COMPLETED | OUTPATIENT
Start: 2024-01-24 | End: 2024-01-24

## 2024-01-24 RX ADMIN — TRIAMCINOLONE ACETONIDE 80 MG: 40 INJECTION, SUSPENSION INTRA-ARTICULAR; INTRAMUSCULAR at 11:03

## 2024-01-24 RX ADMIN — LIDOCAINE HYDROCHLORIDE 8 ML: 10 INJECTION, SOLUTION EPIDURAL; INFILTRATION; INTRACAUDAL; PERINEURAL at 11:03

## 2024-01-24 NOTE — PROGRESS NOTES
Subjective:     Patient ID: Kirsten Centeno is a 54 y.o. female.    Chief Complaint:  Primary osteoarthritis bilateral knees  History of Present Illness  Kirsten Centeno presents to clinic today with spouse for evaluation of bilateral knees.  She has been seen several years ago has received corticosteroid injections.  Pain present along the medial compartment also along the lateral compartment and the anterior aspect of the knee.  Right discomfort a 5 out of a 10 throbbing in nature pain with standing, walking, walking, ascending descending stairs.  She experiences minimal symptom relief with rest.  Denies any recent x-ray, MRI, CT.  Most recently seen in 2019 denies any recent fevers, sweats, chills.  She also denies any recent illness.  Is not diabetic.  She is experiencing swelling and pain on a daily basis.  Denies any other concerns present.    Social History     Occupational History    Not on file   Tobacco Use    Smoking status: Former     Packs/day: 2     Types: Cigarettes    Smokeless tobacco: Never   Substance and Sexual Activity    Alcohol use: Not Currently     Comment: occasionally    Drug use: No    Sexual activity: Defer      Past Medical History:   Diagnosis Date    Arthritis     COPD (chronic obstructive pulmonary disease)     Diabetes     Gout     HTN (hypertension)     Hyperlipidemia     Hyperthyroidism     Neuropathy     Obesity     Sleep apnea      Past Surgical History:   Procedure Laterality Date    MANDIBLE FRACTURE SURGERY         Family History   Problem Relation Age of Onset    Cervical cancer Maternal Grandmother     Cancer Maternal Grandmother     Hypertension Father     Heart disease Father                Objective:  Physical Exam    Vital signs reviewed.   General: No acute distress.  Eyes: conjunctiva clear; pupils equally round and reactive  ENT: external ears and nose atraumatic; oropharynx clear  CV: no peripheral edema  Resp: normal respiratory effort  Skin: no rashes or wounds;  "normal turgor  Psych: mood and affect appropriate; recent and remote memory intact    Vitals:    01/24/24 1013   BP: 147/86   Pulse: 73   Weight: (!) 150 kg (330 lb)   Height: 162.6 cm (64\")         01/24/24  1013   Weight: (!) 150 kg (330 lb)     Body mass index is 56.64 kg/m².      Ortho Exam     Bilateral knees examined  Knee range of motion 5 degrees to 90 degrees  Positive tenderness along the medial compartment, patella  Positive crepitus or arc of motion  Severe swelling  Positive active patellar compression test      Imaging:  Bilateral Knee X-Ray  Indication: Pain    AP, Lateral, and [place]well views    Findings:  No fracture  No bony lesion  Normal soft tissues  Advanced tricompartmental osteoarthritis with bone-on-bone articulation reactive osteophytes in all 3 compartments, overlap medial femoral condyle left knee    prior studies were available for comparison.    Assessment:        1. Pain in both knees, unspecified chronicity    2. Obesity, morbid, BMI 50 or higher    3. Primary osteoarthritis of both knees           Plan:    - Large Joint Arthrocentesis: bilateral knee on 1/24/2024 11:03 AM  Indications: pain  Details: 22 G needle, anterolateral approach  Medications (Right): 80 mg triamcinolone acetonide 40 MG/ML; 8 mL lidocaine PF 1% 1 %  Medications (Left): 8 mL lidocaine PF 1% 1 %; 80 mg triamcinolone acetonide 40 MG/ML  Outcome: tolerated well, no immediate complications  Procedure, treatment alternatives, risks and benefits explained, specific risks discussed. Consent was given by the patient. Immediately prior to procedure a time out was called to verify the correct patient, procedure, equipment, support staff and site/side marked as required. Patient was prepped and draped in the usual sterile fashion.         Discussed plan of care with patient and spouse we did discuss proceeding with corticosteroid injections bilateral knees.  I discussed and ordered proceed with total knee arthroplasty BMI " must be at 40 or less.  Weight would need to be 230 or less with the patient who is 5 feet 4 inches in height.  This can be accomplished with dietary changes and exercise.  We did discuss repeating injections 3 months if needed we will plan to see her back in clinic as needed.  All questions answered.  Orders:  Orders Placed This Encounter   Procedures    - Large Joint Arthrocentesis: bilateral knee    XR Knee 3 View Bilateral     No orders of the defined types were placed in this encounter.          I ordered and reviewed the CHAYITO today.       Dragon dictation utilized  We encourage all our patients to maintain a healthy weight - a Body Mass Index of 20-25. This, along with regular exercise and a balanced diet can lower your risk of many illnesses such as diabetes, heart disease, and stroke.

## 2024-01-24 NOTE — PROGRESS NOTES
Subjective:     Patient ID: Kirsten Centeno is a 54 y.o. female.    Chief Complaint:    History of Present Illness       Social History     Occupational History    Not on file   Tobacco Use    Smoking status: Former     Packs/day: 2     Types: Cigarettes    Smokeless tobacco: Never   Substance and Sexual Activity    Alcohol use: Not Currently     Comment: occasionally    Drug use: No    Sexual activity: Defer      Review of Systems      Past Medical History:   Diagnosis Date    Arthritis     COPD (chronic obstructive pulmonary disease)     Diabetes     Gout     HTN (hypertension)     Hyperlipidemia     Hyperthyroidism     Neuropathy     Obesity     Sleep apnea      Past Surgical History:   Procedure Laterality Date    MANDIBLE FRACTURE SURGERY       Family History   Problem Relation Age of Onset    Cervical cancer Maternal Grandmother     Cancer Maternal Grandmother     Hypertension Father     Heart disease Father          Objective:  There were no vitals filed for this visit.  There were no vitals filed for this visit.  Body mass index is 54.93 kg/m².        Ortho Exam       Assessment:      No diagnosis found.       Plan:            Work Status:    CHAYITO query complete.    Orders:  No orders of the defined types were placed in this encounter.      Medications:  No orders of the defined types were placed in this encounter.      Followup:  No follow-ups on file.          Dictated utilizing Dragon dictation